# Patient Record
Sex: FEMALE | Race: WHITE | NOT HISPANIC OR LATINO | Employment: UNEMPLOYED | URBAN - METROPOLITAN AREA
[De-identification: names, ages, dates, MRNs, and addresses within clinical notes are randomized per-mention and may not be internally consistent; named-entity substitution may affect disease eponyms.]

---

## 2017-07-27 ENCOUNTER — TELEPHONE (OUTPATIENT)
Dept: FAMILY MEDICINE CLINIC | Facility: CLINIC | Age: 52
End: 2017-07-27

## 2017-07-27 RX ORDER — LISINOPRIL 20 MG/1
20 TABLET ORAL DAILY
Qty: 30 TABLET | Refills: 3 | Status: SHIPPED | OUTPATIENT
Start: 2017-07-27 | End: 2017-10-10 | Stop reason: SDUPTHER

## 2017-07-27 NOTE — TELEPHONE ENCOUNTER
I would like for her to increase her lisinopril to 20mg qd from 10 mg qd.  I have sent in new rx for the 20mg tablets.  Recheck with an appt in a few months and monitor her bp prior to that.  Will take a few days for med increase to fully take affect

## 2017-07-27 NOTE — TELEPHONE ENCOUNTER
Pt took bp at home with her machine and it was reading 160/106 in one arm and in the other arm it was reading a totally different number. I advised her that she may need to order another machine.       I took her bp in the office in the left arm it was 160/90 and in the right arm it was 152/90. I advised her that it is elevated, medication adjustment may need to be made but it is okay to go home and I would send message to

## 2017-08-04 ENCOUNTER — TRANSCRIBE ORDERS (OUTPATIENT)
Dept: ADMINISTRATIVE | Facility: HOSPITAL | Age: 52
End: 2017-08-04

## 2017-08-04 DIAGNOSIS — Z12.31 VISIT FOR SCREENING MAMMOGRAM: Primary | ICD-10-CM

## 2017-08-15 ENCOUNTER — OFFICE VISIT (OUTPATIENT)
Dept: FAMILY MEDICINE CLINIC | Facility: CLINIC | Age: 52
End: 2017-08-15

## 2017-08-15 ENCOUNTER — TELEPHONE (OUTPATIENT)
Dept: FAMILY MEDICINE CLINIC | Facility: CLINIC | Age: 52
End: 2017-08-15

## 2017-08-15 VITALS
DIASTOLIC BLOOD PRESSURE: 84 MMHG | SYSTOLIC BLOOD PRESSURE: 140 MMHG | TEMPERATURE: 98.7 F | OXYGEN SATURATION: 96 % | RESPIRATION RATE: 18 BRPM | WEIGHT: 174.5 LBS | BODY MASS INDEX: 28.04 KG/M2 | HEART RATE: 92 BPM | HEIGHT: 66 IN

## 2017-08-15 DIAGNOSIS — R73.9 HYPERGLYCEMIA: ICD-10-CM

## 2017-08-15 DIAGNOSIS — G47.00 INSOMNIA, UNSPECIFIED TYPE: ICD-10-CM

## 2017-08-15 DIAGNOSIS — M54.2 NECK PAIN ON RIGHT SIDE: ICD-10-CM

## 2017-08-15 DIAGNOSIS — I10 ESSENTIAL HYPERTENSION: Primary | ICD-10-CM

## 2017-08-15 PROCEDURE — 99214 OFFICE O/P EST MOD 30 MIN: CPT | Performed by: INTERNAL MEDICINE

## 2017-08-15 RX ORDER — METHYLPREDNISOLONE 4 MG/1
TABLET ORAL
Qty: 21 TABLET | Refills: 0 | Status: SHIPPED | OUTPATIENT
Start: 2017-08-15 | End: 2017-10-10

## 2017-08-15 NOTE — TELEPHONE ENCOUNTER
Pt took her bp with her automatic machine in the left arm with a reading of: 154/97      I took BP manual at the visit with a reading of: 150/90

## 2017-08-15 NOTE — PROGRESS NOTES
Subjective   Julisa Nixon is a 52 y.o. female who comes in today for   Chief Complaint   Patient presents with   • Hypertension   .    History of Present Illness   Here due to elevated blood pressure despite taking lisinopril 10mg qd so I advised a few weeks ago to increase to 20mg qd .  She has had a lot of changes over the past few months with a right shoulder injury and taking 400mg tid frequently that gets better and then worse related to working out in the gym.  Pain can go up her right neck and down her right arm at times.  Since increasing to the 20mg her BP is better.  She strained her right shoulder /neck with doing excessive classes at the gym.  Also taking trazodone for insomnia.    The following portions of the patient's history were reviewed and updated as appropriate: allergies, current medications, past family history, past medical history, past social history, past surgical history and problem list.    Review of Systems   Constitutional: Negative.    Cardiovascular:        Elevated blood pressure   Musculoskeletal: Positive for arthralgias (right shoulder).       Vitals:    08/15/17 1243   BP: 140/84   Pulse: 92   Resp: 18   Temp: 98.7 °F (37.1 °C)   SpO2: 96%       Objective   Physical Exam   Constitutional: She is oriented to person, place, and time. She appears well-developed and well-nourished.   HENT:   Head: Normocephalic and atraumatic.   Right Ear: External ear normal.   Left Ear: External ear normal.   Mouth/Throat: Oropharynx is clear and moist.   Eyes: Conjunctivae are normal.   Neck: Neck supple.   Cardiovascular: Normal rate, regular rhythm and normal heart sounds.    Pulmonary/Chest: Effort normal and breath sounds normal.   Abdominal: Soft. Bowel sounds are normal.   Musculoskeletal:   ROM is normal In shoulder right and left.  Some point tenderness in the posterior right back paraspinal muscles.     Neurological: She is alert and oriented to person, place, and time. She has  normal reflexes.   Skin: Skin is warm.   Psychiatric: She has a normal mood and affect. Her behavior is normal. Judgment and thought content normal.   Nursing note and vitals reviewed.      Assessment/Plan   Julisa was seen today for hypertension.    Diagnoses and all orders for this visit:    Essential hypertension  -     Comprehensive Metabolic Panel  -     Hemoglobin A1c  -     Lipid Panel With LDL / HDL Ratio  -     TSH    Insomnia, unspecified type  -     Comprehensive Metabolic Panel  -     Hemoglobin A1c  -     Lipid Panel With LDL / HDL Ratio  -     TSH    Neck pain on right side  -     Ambulatory Referral to Physical Therapy Evaluate and treat  -     Comprehensive Metabolic Panel  -     Hemoglobin A1c  -     Lipid Panel With LDL / HDL Ratio  -     TSH    Hyperglycemia  -     Comprehensive Metabolic Panel  -     Hemoglobin A1c  -     Lipid Panel With LDL / HDL Ratio  -     TSH    Other orders  -     MethylPREDNISolone (MEDROL, QIAN,) 4 MG tablet; Take as directed on package instructions.      Stop ibuprofen  Start medrol dosepak  Continue lisinopril 20mg qd  Continue traz for insomnia  Start PT for neck and shoulder strain               I have asked for the patient to return to clinic in 6month(s).

## 2017-08-15 NOTE — PROGRESS NOTES
Subjective   Julisa Nixon is a 52 y.o. female who comes in today for   Chief Complaint   Patient presents with   • Hypertension   .    History of Present Illness   HERE TODAY TO F/U ON LISINOPRIL THAT SHE TAKES FOR HTN AND TRAZODONE FOR INSOMNIA.    The following portions of the patient's history were reviewed and updated as appropriate: allergies, current medications, past family history, past medical history, past social history, past surgical history and problem list.    Review of Systems   Constitutional: Negative.    Cardiovascular:        Elev bp   Musculoskeletal: Positive for neck pain.       Vitals:    08/15/17 1243   BP: 140/84   Pulse: 92   Resp: 18   Temp: 98.7 °F (37.1 °C)   SpO2: 96%       Objective   Physical Exam   Constitutional: She is oriented to person, place, and time. She appears well-developed and well-nourished.   HENT:   Head: Normocephalic and atraumatic.   Right Ear: External ear normal.   Left Ear: External ear normal.   Mouth/Throat: Oropharynx is clear and moist.   Eyes: Conjunctivae are normal.   Neck: Neck supple.   Cardiovascular: Normal rate, regular rhythm and normal heart sounds.    Pulmonary/Chest: Effort normal and breath sounds normal.   Abdominal: Soft. Bowel sounds are normal.   Neurological: She is alert and oriented to person, place, and time.   Skin: Skin is warm.   Psychiatric: She has a normal mood and affect. Her behavior is normal. Judgment and thought content normal.   Nursing note and vitals reviewed.      Assessment/Plan   There are no diagnoses linked to this encounter.               I have asked for the patient to return to clinic in 6month(s).

## 2017-08-16 LAB
ALBUMIN SERPL-MCNC: 5 G/DL (ref 3.5–5.2)
ALBUMIN/GLOB SERPL: 1.7 G/DL
ALP SERPL-CCNC: 64 U/L (ref 39–117)
ALT SERPL-CCNC: 25 U/L (ref 1–33)
AST SERPL-CCNC: 17 U/L (ref 1–32)
BILIRUB SERPL-MCNC: 0.3 MG/DL (ref 0.1–1.2)
BUN SERPL-MCNC: 11 MG/DL (ref 6–20)
BUN/CREAT SERPL: 15.1 (ref 7–25)
CALCIUM SERPL-MCNC: 9.4 MG/DL (ref 8.6–10.5)
CHLORIDE SERPL-SCNC: 99 MMOL/L (ref 98–107)
CHOLEST SERPL-MCNC: 255 MG/DL (ref 0–200)
CO2 SERPL-SCNC: 26.6 MMOL/L (ref 22–29)
CREAT SERPL-MCNC: 0.73 MG/DL (ref 0.57–1)
GLOBULIN SER CALC-MCNC: 2.9 GM/DL
GLUCOSE SERPL-MCNC: 96 MG/DL (ref 65–99)
HBA1C MFR BLD: 5.69 % (ref 4.8–5.6)
HDLC SERPL-MCNC: 56 MG/DL (ref 40–60)
LDLC SERPL CALC-MCNC: 169 MG/DL (ref 0–100)
LDLC/HDLC SERPL: 3.01 {RATIO}
POTASSIUM SERPL-SCNC: 4.3 MMOL/L (ref 3.5–5.2)
PROT SERPL-MCNC: 7.9 G/DL (ref 6–8.5)
SODIUM SERPL-SCNC: 141 MMOL/L (ref 136–145)
TRIGL SERPL-MCNC: 151 MG/DL (ref 0–150)
TSH SERPL DL<=0.005 MIU/L-ACNC: 1.92 MIU/ML (ref 0.27–4.2)
VLDLC SERPL CALC-MCNC: 30.2 MG/DL (ref 5–40)

## 2017-08-18 ENCOUNTER — TELEPHONE (OUTPATIENT)
Dept: FAMILY MEDICINE CLINIC | Facility: CLINIC | Age: 52
End: 2017-08-18

## 2017-08-18 RX ORDER — ROSUVASTATIN CALCIUM 5 MG/1
5 TABLET, COATED ORAL DAILY
Qty: 30 TABLET | Refills: 2 | Status: SHIPPED | OUTPATIENT
Start: 2017-08-18 | End: 2017-10-10 | Stop reason: SDUPTHER

## 2017-08-18 NOTE — TELEPHONE ENCOUNTER
The lab results were sent back to dr. Stewart. Will check with her today to see if sending. I think it was lipitor.

## 2017-08-18 NOTE — TELEPHONE ENCOUNTER
----- Message from Chaparrita Ceja sent at 8/17/2017  3:55 PM EDT -----  Pt is checking on the status of her Crestor being sent to [facility].

## 2017-08-21 ENCOUNTER — TELEPHONE (OUTPATIENT)
Dept: FAMILY MEDICINE CLINIC | Facility: CLINIC | Age: 52
End: 2017-08-21

## 2017-09-21 ENCOUNTER — TRANSCRIBE ORDERS (OUTPATIENT)
Dept: ADMINISTRATIVE | Facility: HOSPITAL | Age: 52
End: 2017-09-21

## 2017-09-21 DIAGNOSIS — Z12.31 VISIT FOR SCREENING MAMMOGRAM: Primary | ICD-10-CM

## 2017-10-10 ENCOUNTER — OFFICE VISIT (OUTPATIENT)
Dept: FAMILY MEDICINE CLINIC | Facility: CLINIC | Age: 52
End: 2017-10-10

## 2017-10-10 VITALS
TEMPERATURE: 98.3 F | RESPIRATION RATE: 18 BRPM | OXYGEN SATURATION: 97 % | BODY MASS INDEX: 28.14 KG/M2 | DIASTOLIC BLOOD PRESSURE: 80 MMHG | HEIGHT: 66 IN | HEART RATE: 94 BPM | SYSTOLIC BLOOD PRESSURE: 124 MMHG | WEIGHT: 175.1 LBS

## 2017-10-10 DIAGNOSIS — G47.00 INSOMNIA, UNSPECIFIED TYPE: ICD-10-CM

## 2017-10-10 DIAGNOSIS — I10 ESSENTIAL HYPERTENSION: Primary | ICD-10-CM

## 2017-10-10 DIAGNOSIS — E78.5 HYPERLIPIDEMIA, UNSPECIFIED HYPERLIPIDEMIA TYPE: ICD-10-CM

## 2017-10-10 DIAGNOSIS — R73.9 HYPERGLYCEMIA: ICD-10-CM

## 2017-10-10 PROCEDURE — 99214 OFFICE O/P EST MOD 30 MIN: CPT | Performed by: INTERNAL MEDICINE

## 2017-10-10 RX ORDER — LISINOPRIL 20 MG/1
20 TABLET ORAL DAILY
Qty: 30 TABLET | Refills: 6 | Status: SHIPPED | OUTPATIENT
Start: 2017-10-10 | End: 2018-03-20 | Stop reason: SDUPTHER

## 2017-10-10 RX ORDER — TRAZODONE HYDROCHLORIDE 50 MG/1
50 TABLET ORAL NIGHTLY PRN
Qty: 30 TABLET | Refills: 1 | Status: SHIPPED | OUTPATIENT
Start: 2017-10-10 | End: 2018-09-04

## 2017-10-10 RX ORDER — ROSUVASTATIN CALCIUM 5 MG/1
5 TABLET, COATED ORAL DAILY
Qty: 30 TABLET | Refills: 6 | Status: SHIPPED | OUTPATIENT
Start: 2017-10-10 | End: 2018-03-20 | Stop reason: SDUPTHER

## 2017-10-10 NOTE — PROGRESS NOTES
Subjective   Julisa Nixon is a 52 y.o. female who comes in today for No chief complaint on file.  .    History of Present Illness   Here for f/u on HL and taking crestor.  Her recent left arm and shoulder pain is better and did PT which helped  Hasn't been exercising as much to try and lay off the weights.  She is focusing on cardio at this point.  HTN and on lisinopril and insomnia taking trazodone.    Stress echo 2/2015 neg    The following portions of the patient's history were reviewed and updated as appropriate: allergies, current medications, past family history, past medical history, past social history, past surgical history and problem list.    Review of Systems   Constitutional: Negative.    Musculoskeletal: Negative.    Psychiatric/Behavioral: Negative for sleep disturbance.       Vitals:    10/10/17 1002   BP: 124/80   Pulse: 94   Resp: 18   Temp: 98.3 °F (36.8 °C)   SpO2: 97%       Objective   Physical Exam   Constitutional: She is oriented to person, place, and time. She appears well-developed and well-nourished.   HENT:   Head: Normocephalic and atraumatic.   Right Ear: External ear normal.   Left Ear: External ear normal.   Mouth/Throat: Oropharynx is clear and moist.   Eyes: Conjunctivae are normal.   Neck: Neck supple.   Cardiovascular: Normal rate, regular rhythm and normal heart sounds.    Pulmonary/Chest: Effort normal and breath sounds normal.   Abdominal: Soft. Bowel sounds are normal.   Neurological: She is alert and oriented to person, place, and time.   Skin: Skin is warm.   Psychiatric: She has a normal mood and affect. Her behavior is normal. Judgment and thought content normal.   Nursing note and vitals reviewed.      Assessment/Plan   Diagnoses and all orders for this visit:    Essential hypertension  -     Comprehensive Metabolic Panel  -     Lipid Panel With LDL / HDL Ratio  -     Hemoglobin A1c    Hyperglycemia  -     Comprehensive Metabolic Panel  -     Lipid Panel With LDL /  HDL Ratio  -     Hemoglobin A1c    Hyperlipidemia, unspecified hyperlipidemia type  -     Comprehensive Metabolic Panel  -     Lipid Panel With LDL / HDL Ratio  -     Hemoglobin A1c    Insomnia, unspecified type  -     Comprehensive Metabolic Panel  -     Lipid Panel With LDL / HDL Ratio  -     Hemoglobin A1c    Other orders  -     traZODone (DESYREL) 50 MG tablet; Take 1 tablet by mouth At Night As Needed for Sleep.  -     lisinopril (PRINIVIL,ZESTRIL) 20 MG tablet; Take 1 tablet by mouth Daily.  -     rosuvastatin (CRESTOR) 5 MG tablet; Take 1 tablet by mouth Daily.    If her left shoulder starts to bother her again, she needs to see ortho  Refill her meds for HL, HTN and insomnia  Restart her aerobic exercise               I have asked for the patient to return to clinic in 6month(s).

## 2017-10-11 LAB
ALBUMIN SERPL-MCNC: 4.8 G/DL (ref 3.5–5.2)
ALBUMIN/GLOB SERPL: 1.5 G/DL
ALP SERPL-CCNC: 67 U/L (ref 39–117)
ALT SERPL-CCNC: 26 U/L (ref 1–33)
AST SERPL-CCNC: 19 U/L (ref 1–32)
BILIRUB SERPL-MCNC: 0.4 MG/DL (ref 0.1–1.2)
BUN SERPL-MCNC: 12 MG/DL (ref 6–20)
BUN/CREAT SERPL: 15.2 (ref 7–25)
CALCIUM SERPL-MCNC: 10 MG/DL (ref 8.6–10.5)
CHLORIDE SERPL-SCNC: 102 MMOL/L (ref 98–107)
CHOLEST SERPL-MCNC: 188 MG/DL (ref 0–200)
CO2 SERPL-SCNC: 28.2 MMOL/L (ref 22–29)
CREAT SERPL-MCNC: 0.79 MG/DL (ref 0.57–1)
GLOBULIN SER CALC-MCNC: 3.2 GM/DL
GLUCOSE SERPL-MCNC: 105 MG/DL (ref 65–99)
HBA1C MFR BLD: 5.51 % (ref 4.8–5.6)
HDLC SERPL-MCNC: 62 MG/DL (ref 40–60)
LDLC SERPL CALC-MCNC: 98 MG/DL (ref 0–100)
LDLC/HDLC SERPL: 1.58 {RATIO}
POTASSIUM SERPL-SCNC: 4.5 MMOL/L (ref 3.5–5.2)
PROT SERPL-MCNC: 8 G/DL (ref 6–8.5)
SODIUM SERPL-SCNC: 144 MMOL/L (ref 136–145)
TRIGL SERPL-MCNC: 139 MG/DL (ref 0–150)
VLDLC SERPL CALC-MCNC: 27.8 MG/DL (ref 5–40)

## 2017-10-23 ENCOUNTER — HOSPITAL ENCOUNTER (OUTPATIENT)
Dept: MAMMOGRAPHY | Facility: HOSPITAL | Age: 52
Discharge: HOME | End: 2017-10-23
Attending: OBSTETRICS & GYNECOLOGY | Admitting: OBSTETRICS & GYNECOLOGY

## 2017-10-23 DIAGNOSIS — Z12.31 VISIT FOR SCREENING MAMMOGRAM: ICD-10-CM

## 2017-10-23 PROCEDURE — 77063 BREAST TOMOSYNTHESIS BI: CPT

## 2017-10-23 PROCEDURE — G0202 SCR MAMMO BI INCL CAD: HCPCS

## 2017-10-26 ENCOUNTER — TELEPHONE (OUTPATIENT)
Dept: OBSTETRICS AND GYNECOLOGY | Facility: CLINIC | Age: 52
End: 2017-10-26

## 2017-11-08 ENCOUNTER — OFFICE VISIT (OUTPATIENT)
Dept: SPORTS MEDICINE | Facility: CLINIC | Age: 52
End: 2017-11-08

## 2017-11-08 VITALS
BODY MASS INDEX: 28.99 KG/M2 | SYSTOLIC BLOOD PRESSURE: 124 MMHG | DIASTOLIC BLOOD PRESSURE: 70 MMHG | HEIGHT: 65 IN | WEIGHT: 174 LBS

## 2017-11-08 DIAGNOSIS — M25.512 LEFT SHOULDER PAIN, UNSPECIFIED CHRONICITY: Primary | ICD-10-CM

## 2017-11-08 PROCEDURE — 99203 OFFICE O/P NEW LOW 30 MIN: CPT | Performed by: FAMILY MEDICINE

## 2017-11-08 NOTE — PROGRESS NOTES
"Julisa is a 52 y.o. year old female    Chief Complaint   Patient presents with   • Shoulder Pain     Left       History of Present Illness  HPI   Here to discuss concerns with left shoulder pain. This spring went back to exercising in the gym after a few months off and had rather abrupt obset left shoulder pain. Resolved with rest, but again returned when she went back to exercise. Burning pain, constant, radiates to the neck and front of the chest. She did PT this summer with decent benefit.  Currently exercising without weights and doing well, no pain with daily activities.     I have reviewed the patient's medical, family, and social history in detail and updated the computerized patient record.    Review of Systems   Constitutional: Negative for fever.   Skin: Negative for wound.   Neurological: Negative for numbness.   All other systems reviewed and are negative.      /70  Ht 65\" (165.1 cm)  Wt 174 lb (78.9 kg)  BMI 28.96 kg/m2     Physical Exam    Vital signs reviewed.   General: No acute distress.  Eyes: conjunctiva clear; pupils equally round and reactive  ENT: external ears and nose atraumatic; oropharynx clear  CV: no peripheral edema, 2+ distal pulses  Resp: normal respiratory effort, no use of accessory muscles  Skin: no rashes or wounds; normal turgor  Psych: mood and affect appropriate; recent and remote memory intact  Neuro: sensation to light touch intact    MSK Exam:  Left Shoulder Exam   Left shoulder exam is normal.    Tenderness   The patient is experiencing no tenderness.         Range of Motion   The patient has normal left shoulder ROM.    Muscle Strength   The patient has normal left shoulder strength.    Tests   Apprehension: negative  Cross Arm: negative  Drop Arm: negative  Hawkin's test: negative  Impingement: negative                Diagnoses and all orders for this visit:    Left shoulder pain, unspecified chronicity  I discussed the patient's concerns in detail; essentially, " she needs to start a gradual return to activity for an issue that appears to have resolved. Her shoulder is functioning appropriately and appears stable for this. We discussed foundational shoulder stability strengthening and progression from there. I provided some exercises, and additionally recommend she meet with a  at her gym to help with this progression. She can call and/or follow up with any questions, recurrent pain, or concerns.     I spent greater than 50% of this 35 minute visit discussing the diagnosis, prognosis, treatment plan, etc. Patient's questions were answered in detail with appropriate counseling and anticipatory guidance.        EMR Dragon/Transcription disclaimer:    Much of this encounter note is an electronic transcription/translation of spoken language to printed text.  The electronic translation of spoken language may permit erroneous, or at times, nonsensical words or phrases to be inadvertently transcribed.  Although I have reviewed the note for such errors some may still exist.

## 2017-12-13 ENCOUNTER — OFFICE VISIT (OUTPATIENT)
Dept: OBSTETRICS AND GYNECOLOGY | Facility: CLINIC | Age: 52
End: 2017-12-13

## 2017-12-13 VITALS
WEIGHT: 180 LBS | HEIGHT: 66 IN | BODY MASS INDEX: 28.93 KG/M2 | SYSTOLIC BLOOD PRESSURE: 132 MMHG | DIASTOLIC BLOOD PRESSURE: 80 MMHG

## 2017-12-13 DIAGNOSIS — Z00.00 ANNUAL PHYSICAL EXAM: Primary | ICD-10-CM

## 2017-12-13 LAB
BILIRUB BLD-MCNC: NEGATIVE MG/DL
CLARITY, POC: CLEAR
COLOR UR: YELLOW
GLUCOSE UR STRIP-MCNC: NEGATIVE MG/DL
KETONES UR QL: NEGATIVE
LEUKOCYTE EST, POC: NEGATIVE
NITRITE UR-MCNC: NEGATIVE MG/ML
PH UR: 5 [PH] (ref 5–8)
PROT UR STRIP-MCNC: NEGATIVE MG/DL
RBC # UR STRIP: NEGATIVE /UL
SP GR UR: 1 (ref 1–1.03)
UROBILINOGEN UR QL: NORMAL

## 2017-12-13 PROCEDURE — 81002 URINALYSIS NONAUTO W/O SCOPE: CPT | Performed by: OBSTETRICS & GYNECOLOGY

## 2017-12-13 PROCEDURE — 99396 PREV VISIT EST AGE 40-64: CPT | Performed by: OBSTETRICS & GYNECOLOGY

## 2017-12-13 NOTE — PROGRESS NOTES
GYN Annual Exam     CC- Here for annual exam.     Julisa Nixon is a 52 y.o. female established patient who presents for annual well woman exam. NO  VB or hot flashes. She has URI today and feels terrible.         Menarche:11  Menopause:44  HRT:none  Current contraception: status post hysterectomy- TLH with OC for DUB age 44  History of abnormal Pap smear: no  History of abnormal mammogram: yes - B9 cyst  Family history of uterine, colon or ovarian cancer: yes - PGF and P aunt colon  Family history of breast cancer: no  STD's: none    Health Maintenance   Topic Date Due   • TDAP/TD VACCINES (1 - Tdap) 05/05/1982   • HEPATITIS C SCREENING  07/15/2016   • LIPID PANEL  10/10/2018   • MAMMOGRAM  10/23/2019   • PAP SMEAR  11/01/2019   • COLONOSCOPY  09/22/2024   • INFLUENZA VACCINE  Addressed       Past Medical History:   Diagnosis Date   • Hypertension        Past Surgical History:   Procedure Laterality Date   • BREAST BIOPSY     • COLONOSCOPY     • HYSTERECTOMY      TLH with OC age 44 for DUB         Current Outpatient Prescriptions:   •  benzonatate (TESSALON) 200 MG capsule, One cap po TID for cough, Disp: 30 capsule, Rfl: 1  •  guaifenesin-codeine (CHERATUSSIN AC) 100-10 MG/5ML liquid, 10 ml po Q4H prn cough, Disp: 118 mL, Rfl: 1  •  lisinopril (PRINIVIL,ZESTRIL) 20 MG tablet, Take 1 tablet by mouth Daily., Disp: 30 tablet, Rfl: 6  •  rosuvastatin (CRESTOR) 5 MG tablet, Take 1 tablet by mouth Daily., Disp: 30 tablet, Rfl: 6  •  traZODone (DESYREL) 50 MG tablet, Take 1 tablet by mouth At Night As Needed for Sleep., Disp: 30 tablet, Rfl: 1    No Known Allergies    Social History   Substance Use Topics   • Smoking status: Never Smoker   • Smokeless tobacco: Never Used   • Alcohol use No       Family History   Problem Relation Age of Onset   • Stroke Father    • Colon cancer Paternal Grandfather    • Colon cancer Paternal Aunt    • Breast cancer Neg Hx    • Ovarian cancer Neg Hx        Review of Systems  "  Constitutional: Negative for appetite change, fatigue, fever and unexpected weight change.   HENT: Positive for congestion, postnasal drip and sinus pain.    Eyes: Positive for redness.   Respiratory: Positive for cough. Negative for shortness of breath.    Cardiovascular: Negative for chest pain and palpitations.   Gastrointestinal: Negative for abdominal distention, abdominal pain, constipation, diarrhea and nausea.   Endocrine: Negative for cold intolerance and heat intolerance.   Genitourinary: Negative for dyspareunia, dysuria, menstrual problem, pelvic pain and vaginal discharge.   Skin: Negative for color change and rash.   Neurological: Negative for headaches.   Psychiatric/Behavioral: Negative for dysphoric mood. The patient is not nervous/anxious.        /80  Ht 167.6 cm (66\")  Wt 81.6 kg (180 lb)  BMI 29.05 kg/m2    Physical Exam   Constitutional: She is oriented to person, place, and time. She appears well-developed and well-nourished.   HENT:   Head: Normocephalic and atraumatic.   Neck: No thyromegaly present.   Cardiovascular: Normal rate and regular rhythm.    Pulmonary/Chest: Effort normal and breath sounds normal. Right breast exhibits no inverted nipple, no mass, no nipple discharge, no skin change and no tenderness. Left breast exhibits no inverted nipple, no mass, no nipple discharge, no skin change and no tenderness.   Abdominal: Soft. Bowel sounds are normal. She exhibits no distension and no mass. There is no tenderness. No hernia.   Genitourinary: Pelvic exam was performed with patient supine. There is no rash, tenderness or lesion on the right labia. There is no rash, tenderness or lesion on the left labia. Right adnexum displays no mass, no tenderness and no fullness. Left adnexum displays no mass, no tenderness and no fullness. No erythema, tenderness or bleeding in the vagina. No foreign body in the vagina. No signs of injury around the vagina. No vaginal discharge found. "   Genitourinary Comments: Normal cuff   Neurological: She is oriented to person, place, and time.   Skin: Skin is warm and dry.   Psychiatric: She has a normal mood and affect. Her behavior is normal. Judgment and thought content normal.   Nursing note and vitals reviewed.         Assessment/Plan    1) GYN HM: check pap/HPV   SBE demonstrated and encouraged.  2) STD screening: declines Condoms encouraged.  3) Bone health - Weight bearing exercise, dietary calcium recommendations and vitamin D reviewed.   4) Diet and Exercise discussed  5) Smoking Status: nonsmoker  6) Social: daughter pregnant  7)MMG:  UTD 11/2017, B1  8) DEXA-plan age 53  9)C scope- UTD until 2019   Follow up prn and 1 year       Julisa was seen today for gynecologic exam.    Diagnoses and all orders for this visit:    Annual physical exam  -     PapIG, HPV, Rfx 16 / 18 - ThinPrep Vial, Cervix  -     POC Urinalysis Dipstick        Ariana Salcedo MD  12/13/2017  9:36 PM

## 2017-12-18 LAB
CYTOLOGIST CVX/VAG CYTO: NORMAL
CYTOLOGY CVX/VAG DOC THIN PREP: NORMAL
DX ICD CODE: NORMAL
HIV 1 & 2 AB SER-IMP: NORMAL
HPV I/H RISK 1 DNA CVX QL PROBE+SIG AMP: NEGATIVE
OTHER STN SPEC: NORMAL
PATH REPORT.FINAL DX SPEC: NORMAL
STAT OF ADQ CVX/VAG CYTO-IMP: NORMAL

## 2018-03-20 ENCOUNTER — OFFICE VISIT (OUTPATIENT)
Dept: FAMILY MEDICINE CLINIC | Facility: CLINIC | Age: 53
End: 2018-03-20

## 2018-03-20 VITALS
HEIGHT: 66 IN | SYSTOLIC BLOOD PRESSURE: 130 MMHG | BODY MASS INDEX: 28.53 KG/M2 | HEART RATE: 99 BPM | WEIGHT: 177.5 LBS | DIASTOLIC BLOOD PRESSURE: 70 MMHG | RESPIRATION RATE: 18 BRPM | TEMPERATURE: 97.8 F | OXYGEN SATURATION: 97 %

## 2018-03-20 DIAGNOSIS — G47.00 INSOMNIA, UNSPECIFIED TYPE: ICD-10-CM

## 2018-03-20 DIAGNOSIS — E78.5 HYPERLIPIDEMIA, UNSPECIFIED HYPERLIPIDEMIA TYPE: ICD-10-CM

## 2018-03-20 DIAGNOSIS — I10 ESSENTIAL HYPERTENSION: Primary | ICD-10-CM

## 2018-03-20 DIAGNOSIS — R73.9 HYPERGLYCEMIA: ICD-10-CM

## 2018-03-20 PROCEDURE — 99214 OFFICE O/P EST MOD 30 MIN: CPT | Performed by: INTERNAL MEDICINE

## 2018-03-20 RX ORDER — ROSUVASTATIN CALCIUM 5 MG/1
5 TABLET, COATED ORAL DAILY
Qty: 30 TABLET | Refills: 6 | Status: SHIPPED | OUTPATIENT
Start: 2018-03-20 | End: 2018-06-20 | Stop reason: SINTOL

## 2018-03-20 RX ORDER — LISINOPRIL 20 MG/1
20 TABLET ORAL DAILY
Qty: 30 TABLET | Refills: 6 | Status: SHIPPED | OUTPATIENT
Start: 2018-03-20 | End: 2018-11-19 | Stop reason: SDUPTHER

## 2018-03-20 NOTE — PROGRESS NOTES
Subjective   Julisa Nixon is a 52 y.o. female who comes in today for   Chief Complaint   Patient presents with   • Hypertension     routine visit needs refills    .    History of Present Illness   Here for f/u on HTN and HL.  Doing well on lisinopril and crestor for HTN and HL .  Insomnia is treated with trazodone.  Doing well and exercising.  Is expecting 2 grandbabies.  Feeling well.  No complaints.    The following portions of the patient's history were reviewed and updated as appropriate: allergies, current medications, past family history, past medical history, past social history, past surgical history and problem list.    Review of Systems   Constitutional: Negative.    Respiratory: Negative.    Cardiovascular: Negative.    Psychiatric/Behavioral: Negative.        Vitals:    03/20/18 1403   BP: 130/70   Pulse: 99   Resp: 18   Temp: 97.8 °F (36.6 °C)   SpO2: 97%       Objective   Physical Exam   Constitutional: She is oriented to person, place, and time. She appears well-developed and well-nourished.   HENT:   Head: Normocephalic and atraumatic.   Right Ear: External ear normal.   Left Ear: External ear normal.   Mouth/Throat: Oropharynx is clear and moist.   Eyes: Conjunctivae are normal.   Neck: Neck supple.   Cardiovascular: Normal rate, regular rhythm and normal heart sounds.    No bruits   Pulmonary/Chest: Effort normal and breath sounds normal. No respiratory distress. She has no wheezes. She has no rales.   Abdominal: Soft. Bowel sounds are normal. She exhibits no distension and no mass. There is no tenderness.   Lymphadenopathy:     She has no cervical adenopathy.   Neurological: She is alert and oriented to person, place, and time.   Skin: Skin is warm.   Psychiatric: She has a normal mood and affect. Her behavior is normal. Judgment and thought content normal.   Nursing note and vitals reviewed.      Assessment/Plan   Julisa was seen today for hypertension.    Diagnoses and all orders for this  visit:    Essential hypertension  -     Comprehensive Metabolic Panel  -     Lipid Panel With LDL / HDL Ratio  -     TSH  -     Hemoglobin A1c    Hyperlipidemia, unspecified hyperlipidemia type  -     Comprehensive Metabolic Panel  -     Lipid Panel With LDL / HDL Ratio  -     TSH  -     Hemoglobin A1c    Insomnia, unspecified type  -     Comprehensive Metabolic Panel  -     Lipid Panel With LDL / HDL Ratio  -     TSH  -     Hemoglobin A1c    Hyperglycemia  -     Comprehensive Metabolic Panel  -     Lipid Panel With LDL / HDL Ratio  -     TSH  -     Hemoglobin A1c    Other orders  -     lisinopril (PRINIVIL,ZESTRIL) 20 MG tablet; Take 1 tablet by mouth Daily.  -     rosuvastatin (CRESTOR) 5 MG tablet; Take 1 tablet by mouth Daily.    RF crestor and lisinopril for HL and HTN  Labs ordered                 I have asked for the patient to return to clinic in 6month(s).

## 2018-03-21 LAB
ALBUMIN SERPL-MCNC: 4.6 G/DL (ref 3.5–5.2)
ALBUMIN/GLOB SERPL: 1.5 G/DL
ALP SERPL-CCNC: 70 U/L (ref 39–117)
ALT SERPL-CCNC: 36 U/L (ref 1–33)
AST SERPL-CCNC: 27 U/L (ref 1–32)
BILIRUB SERPL-MCNC: 0.4 MG/DL (ref 0.1–1.2)
BUN SERPL-MCNC: 15 MG/DL (ref 6–20)
BUN/CREAT SERPL: 18.5 (ref 7–25)
CALCIUM SERPL-MCNC: 9.5 MG/DL (ref 8.6–10.5)
CHLORIDE SERPL-SCNC: 98 MMOL/L (ref 98–107)
CHOLEST SERPL-MCNC: 162 MG/DL (ref 0–200)
CO2 SERPL-SCNC: 27.6 MMOL/L (ref 22–29)
CREAT SERPL-MCNC: 0.81 MG/DL (ref 0.57–1)
GFR SERPLBLD CREATININE-BSD FMLA CKD-EPI: 74 ML/MIN/1.73
GFR SERPLBLD CREATININE-BSD FMLA CKD-EPI: 89 ML/MIN/1.73
GLOBULIN SER CALC-MCNC: 3.1 GM/DL
GLUCOSE SERPL-MCNC: 89 MG/DL (ref 65–99)
HBA1C MFR BLD: 5.95 % (ref 4.8–5.6)
HDLC SERPL-MCNC: 59 MG/DL (ref 40–60)
LDLC SERPL CALC-MCNC: 82 MG/DL (ref 0–100)
LDLC/HDLC SERPL: 1.4 {RATIO}
POTASSIUM SERPL-SCNC: 4 MMOL/L (ref 3.5–5.2)
PROT SERPL-MCNC: 7.7 G/DL (ref 6–8.5)
SODIUM SERPL-SCNC: 140 MMOL/L (ref 136–145)
TRIGL SERPL-MCNC: 103 MG/DL (ref 0–150)
TSH SERPL DL<=0.005 MIU/L-ACNC: 1.73 MIU/ML (ref 0.27–4.2)
VLDLC SERPL CALC-MCNC: 20.6 MG/DL (ref 5–40)

## 2018-04-26 ENCOUNTER — TELEPHONE (OUTPATIENT)
Dept: FAMILY MEDICINE CLINIC | Facility: CLINIC | Age: 53
End: 2018-04-26

## 2018-04-26 RX ORDER — ONDANSETRON 4 MG/1
4 TABLET, ORALLY DISINTEGRATING ORAL EVERY 8 HOURS PRN
Qty: 20 TABLET | Refills: 0 | Status: SHIPPED | OUTPATIENT
Start: 2018-04-26 | End: 2018-09-04

## 2018-04-26 NOTE — TELEPHONE ENCOUNTER
Patient is going out of the country on Saturday.   She would like a travelers medication sent in for her, for nausea and diarrhea.

## 2018-04-26 NOTE — TELEPHONE ENCOUNTER
zofran sent to pharmacy however the diarrhea medication is otc.  immodium works well and is safe to take as long as she isn't have infectious sx's like fever or abdominal pain

## 2018-06-20 ENCOUNTER — TELEPHONE (OUTPATIENT)
Dept: FAMILY MEDICINE CLINIC | Facility: CLINIC | Age: 53
End: 2018-06-20

## 2018-06-20 RX ORDER — ATORVASTATIN CALCIUM 20 MG/1
20 TABLET, FILM COATED ORAL DAILY
Qty: 30 TABLET | Refills: 1 | Status: SHIPPED | OUTPATIENT
Start: 2018-06-20 | End: 2018-09-04

## 2018-06-20 NOTE — TELEPHONE ENCOUNTER
Patient is taking crestor and said that she is having some leg cramps so she stopped taking it and would like to know if she could try something else

## 2018-06-26 ENCOUNTER — TELEPHONE (OUTPATIENT)
Dept: FAMILY MEDICINE CLINIC | Facility: CLINIC | Age: 53
End: 2018-06-26

## 2018-06-26 NOTE — TELEPHONE ENCOUNTER
Patient said that her crestor was only 5mg & lipitor was  Send in at 20mg. Can this be resent in as 5mg?

## 2018-06-27 NOTE — TELEPHONE ENCOUNTER
lipitor isn't as potent as crestor therefore a higher dosage is needed.  She can break the lipitor 20 mg in half and take a half qd and recheck labs in 6-8 weeks.  Or she could do 1/2 of the 20 mg qod and recheck in 6-8 weeks

## 2018-07-24 ENCOUNTER — TELEPHONE (OUTPATIENT)
Dept: FAMILY MEDICINE CLINIC | Facility: CLINIC | Age: 53
End: 2018-07-24

## 2018-07-26 ENCOUNTER — CLINICAL SUPPORT (OUTPATIENT)
Dept: FAMILY MEDICINE CLINIC | Facility: CLINIC | Age: 53
End: 2018-07-26

## 2018-07-26 DIAGNOSIS — Z23 IMMUNIZATION DUE: Primary | ICD-10-CM

## 2018-07-26 PROCEDURE — 90715 TDAP VACCINE 7 YRS/> IM: CPT | Performed by: INTERNAL MEDICINE

## 2018-07-26 PROCEDURE — 90471 IMMUNIZATION ADMIN: CPT | Performed by: INTERNAL MEDICINE

## 2018-09-04 RX ORDER — ATORVASTATIN CALCIUM 20 MG/1
TABLET, FILM COATED ORAL
Qty: 30 TABLET | Refills: 2 | Status: SHIPPED | OUTPATIENT
Start: 2018-09-04 | End: 2019-05-15 | Stop reason: SDUPTHER

## 2018-11-12 ENCOUNTER — TELEPHONE (OUTPATIENT)
Dept: FAMILY MEDICINE CLINIC | Facility: CLINIC | Age: 53
End: 2018-11-12

## 2018-11-12 ENCOUNTER — TRANSCRIBE ORDERS (OUTPATIENT)
Dept: ADMINISTRATIVE | Facility: HOSPITAL | Age: 53
End: 2018-11-12

## 2018-11-12 DIAGNOSIS — Z12.31 SCREENING MAMMOGRAM, ENCOUNTER FOR: Primary | ICD-10-CM

## 2018-11-12 NOTE — TELEPHONE ENCOUNTER
PT CALLED to see if you need to see her before she refills her bp meds? She is almost out last seen 3/18. Send to [facility]

## 2018-11-13 NOTE — TELEPHONE ENCOUNTER
Do not need to see her prior to refills.  She does need to make an appointment however in the upcoming months.  Which meds does she need refilled and to what pharmacy?

## 2018-11-19 RX ORDER — LISINOPRIL 20 MG/1
TABLET ORAL
Qty: 30 TABLET | Refills: 5 | Status: SHIPPED | OUTPATIENT
Start: 2018-11-19 | End: 2019-05-15 | Stop reason: SDUPTHER

## 2018-11-19 RX ORDER — TRAZODONE HYDROCHLORIDE 50 MG/1
TABLET ORAL
Qty: 30 TABLET | Refills: 5 | Status: SHIPPED | OUTPATIENT
Start: 2018-11-19 | End: 2019-12-23

## 2019-01-23 ENCOUNTER — OFFICE VISIT (OUTPATIENT)
Dept: OBSTETRICS AND GYNECOLOGY | Facility: CLINIC | Age: 54
End: 2019-01-23

## 2019-01-23 VITALS
DIASTOLIC BLOOD PRESSURE: 82 MMHG | WEIGHT: 181 LBS | BODY MASS INDEX: 29.09 KG/M2 | HEIGHT: 66 IN | SYSTOLIC BLOOD PRESSURE: 118 MMHG

## 2019-01-23 DIAGNOSIS — Z13.9 SCREENING FOR CONDITION: Primary | ICD-10-CM

## 2019-01-23 DIAGNOSIS — Z01.419 WELL WOMAN EXAM WITH ROUTINE GYNECOLOGICAL EXAM: ICD-10-CM

## 2019-01-23 PROCEDURE — 81002 URINALYSIS NONAUTO W/O SCOPE: CPT | Performed by: OBSTETRICS & GYNECOLOGY

## 2019-01-23 PROCEDURE — 99396 PREV VISIT EST AGE 40-64: CPT | Performed by: OBSTETRICS & GYNECOLOGY

## 2019-01-23 NOTE — PROGRESS NOTES
GYN Annual Exam     CC- Here for annual exam.     Julisa Nixon is a 53 y.o. female established patient who presents for annual well woman exam. NO  VB. She has 2 new grandsons and one lives here in town. Her daughter delivered at [place] and pushed for 5 hours. She had a vaginal delivery but had a pelvic fracture and 6 months out is still suffering with pain. Julisa is watching her grandson a lot and is very tired from having a young child in her care.       OB History      Para Term  AB Living    2 2 2     2    SAB TAB Ectopic Molar Multiple Live Births                       Obstetric Comments    2           Menarche:11  Menopause:44  HRT:none  Current contraception: status post hysterectomy- s/p TLH with OC for DUB age 44  History of abnormal Pap smear: no  History of abnormal mammogram: yes - B9 cyst bx  Family history of uterine, colon or ovarian cancer: yes - PGF and P aunt  Family history of breast cancer: no  STD's: none  Last pap smear:17- nl x 2      Health Maintenance   Topic Date Due   • ZOSTER VACCINE (1 of 2) 2013   • HEPATITIS C SCREENING  07/15/2016   • INFLUENZA VACCINE  2018   • ANNUAL PHYSICAL  2018   • LIPID PANEL  2019   • MAMMOGRAM  10/23/2019   • PAP SMEAR  2020   • COLONOSCOPY  2024   • TDAP/TD VACCINES (2 - Td) 2028       Past Medical History:   Diagnosis Date   • Hypertension        Past Surgical History:   Procedure Laterality Date   • BREAST BIOPSY     • COLONOSCOPY     • HYSTERECTOMY      TLH with OC age 44 for DUB         Current Outpatient Medications:   •  amoxicillin-clavulanate (AUGMENTIN) 875-125 MG per tablet, Take 1 tablet by mouth 2 (Two) Times a Day., Disp: 20 tablet, Rfl: 0  •  atorvastatin (LIPITOR) 20 MG tablet, TAKE ONE TABLET BY MOUTH DAILY, Disp: 30 tablet, Rfl: 2  •  erythromycin (ROMYCIN) 5 MG/GM ophthalmic ointment, Administer  to the right eye Every Night., Disp: 1 each, Rfl: 0  •  lisinopril  "(PRINIVIL,ZESTRIL) 20 MG tablet, TAKE ONE TABLET BY MOUTH DAILY, Disp: 30 tablet, Rfl: 5  •  traZODone (DESYREL) 50 MG tablet, TAKE ONE TABLET BY MOUTH ONCE NIGHTLY AS NEEDED FOR SLEEP, Disp: 30 tablet, Rfl: 5    No Known Allergies    Social History     Tobacco Use   • Smoking status: Never Smoker   • Smokeless tobacco: Never Used   Substance Use Topics   • Alcohol use: No   • Drug use: No       Family History   Problem Relation Age of Onset   • Stroke Father    • Colon cancer Paternal Grandfather    • Colon cancer Paternal Aunt    • Breast cancer Neg Hx    • Ovarian cancer Neg Hx        Review of Systems   Constitutional: Positive for fatigue. Negative for appetite change, fever and unexpected weight change.   Respiratory: Negative for cough and shortness of breath.    Cardiovascular: Negative for chest pain and palpitations.   Gastrointestinal: Negative for abdominal distention, abdominal pain, constipation, diarrhea and nausea.   Endocrine: Negative for cold intolerance and heat intolerance.   Genitourinary: Negative for dyspareunia, dysuria, menstrual problem, pelvic pain and vaginal discharge.   Musculoskeletal: Positive for neck pain (from lifting grandson).   Skin: Negative for color change and rash.   Neurological: Negative for headaches.   Psychiatric/Behavioral: Negative for dysphoric mood. The patient is not nervous/anxious.        /82   Ht 167.6 cm (66\")   Wt 82.1 kg (181 lb)   BMI 29.21 kg/m²     Physical Exam   Constitutional: She is oriented to person, place, and time. She appears well-developed and well-nourished.   HENT:   Head: Normocephalic and atraumatic.   Eyes: Conjunctivae are normal. No scleral icterus.   Neck: Neck supple. No thyromegaly present.   Cardiovascular: Normal rate and regular rhythm.   Pulmonary/Chest: Effort normal and breath sounds normal. Right breast exhibits no inverted nipple, no mass, no nipple discharge, no skin change and no tenderness. Left breast exhibits no " inverted nipple, no mass, no nipple discharge, no skin change and no tenderness.   Abdominal: Soft. Bowel sounds are normal. She exhibits no distension and no mass. There is no tenderness. There is no rebound and no guarding. No hernia.   Genitourinary: Pelvic exam was performed with patient supine. There is no rash, tenderness or lesion on the right labia. There is no rash, tenderness or lesion on the left labia. Right adnexum displays no mass, no tenderness and no fullness. Left adnexum displays no mass, no tenderness and no fullness. No erythema, tenderness or bleeding in the vagina. No foreign body in the vagina. No signs of injury around the vagina. No vaginal discharge found.   Genitourinary Comments: Normal cuff   Neurological: She is alert and oriented to person, place, and time.   Skin: Skin is warm and dry.   Psychiatric: She has a normal mood and affect. Her behavior is normal. Judgment and thought content normal.   Nursing note and vitals reviewed.         Assessment/Plan    1) GYN HM: check pap/HPV   SBE demonstrated and encouraged.  2) STD screening: declines Condoms encouraged.  3) Bone health - Weight bearing exercise, dietary calcium recommendations and vitamin D reviewed.   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    Diagnoses and all orders for this visit:    Screening for condition  -     POC Urinalysis Dipstick  -     DEXA Bone Density Axial; Future    Well woman exam with routine gynecological exam  -     Pap IG, HPV-hr        Ariana Salcedo MD  1/23/2019  2:52 PM

## 2019-01-31 ENCOUNTER — HOSPITAL ENCOUNTER (OUTPATIENT)
Dept: MAMMOGRAPHY | Facility: HOSPITAL | Age: 54
Discharge: HOME | End: 2019-01-31
Attending: OBSTETRICS & GYNECOLOGY | Admitting: OBSTETRICS & GYNECOLOGY

## 2019-01-31 DIAGNOSIS — Z12.31 SCREENING MAMMOGRAM, ENCOUNTER FOR: ICD-10-CM

## 2019-01-31 PROCEDURE — 77063 BREAST TOMOSYNTHESIS BI: CPT

## 2019-01-31 PROCEDURE — 77067 SCR MAMMO BI INCL CAD: CPT

## 2019-02-26 ENCOUNTER — HOSPITAL ENCOUNTER (OUTPATIENT)
Dept: BONE DENSITY | Facility: HOSPITAL | Age: 54
Discharge: HOME | End: 2019-02-26
Attending: OBSTETRICS & GYNECOLOGY | Admitting: OBSTETRICS & GYNECOLOGY

## 2019-02-26 ENCOUNTER — OFFICE VISIT (OUTPATIENT)
Dept: FAMILY MEDICINE CLINIC | Facility: CLINIC | Age: 54
End: 2019-02-26

## 2019-02-26 VITALS
TEMPERATURE: 98.5 F | WEIGHT: 179.8 LBS | HEART RATE: 81 BPM | BODY MASS INDEX: 28.9 KG/M2 | SYSTOLIC BLOOD PRESSURE: 156 MMHG | DIASTOLIC BLOOD PRESSURE: 92 MMHG | HEIGHT: 66 IN | OXYGEN SATURATION: 95 %

## 2019-02-26 DIAGNOSIS — H61.21 IMPACTED CERUMEN, RIGHT EAR: ICD-10-CM

## 2019-02-26 DIAGNOSIS — M47.818 ARTHROPATHY OF RIGHT SACROILIAC JOINT: ICD-10-CM

## 2019-02-26 DIAGNOSIS — M54.2 NECK PAIN ON RIGHT SIDE: ICD-10-CM

## 2019-02-26 DIAGNOSIS — Z13.9 SCREENING FOR CONDITION: ICD-10-CM

## 2019-02-26 DIAGNOSIS — R73.9 HYPERGLYCEMIA: ICD-10-CM

## 2019-02-26 DIAGNOSIS — I10 ESSENTIAL HYPERTENSION: Primary | ICD-10-CM

## 2019-02-26 DIAGNOSIS — E78.5 HYPERLIPIDEMIA, UNSPECIFIED HYPERLIPIDEMIA TYPE: ICD-10-CM

## 2019-02-26 DIAGNOSIS — R59.9 ENLARGED LYMPH NODE: ICD-10-CM

## 2019-02-26 PROCEDURE — 99214 OFFICE O/P EST MOD 30 MIN: CPT | Performed by: INTERNAL MEDICINE

## 2019-02-26 PROCEDURE — 77080 DXA BONE DENSITY AXIAL: CPT

## 2019-02-26 RX ORDER — METHYLPREDNISOLONE 4 MG/1
TABLET ORAL
Qty: 21 TABLET | Refills: 0 | Status: SHIPPED | OUTPATIENT
Start: 2019-02-26 | End: 2019-04-01

## 2019-02-26 NOTE — PROGRESS NOTES
Subjective   Julisa Nixon is a 53 y.o. female who comes in today for   Chief Complaint   Patient presents with   • Follow-up     med check and she is fasting today. she does watch her grandbabies   • Shoulder Pain     right neck/shoulder/back also lymph nodes swollen  wondering if lab could show anything on the inflamation    • Muscle Pain     she stopped her chol med thinking it was it   .    History of Present Illness   Right shoulder and right SI joint that started after carrying 6 mo grandson  Went to [place] and got worse with walking and the rides.  Once she got home, she carried her grandson for 3 days and it got worse.  Hurts to walk and burning pain in the inner thigh.  Some ha's as well and feels tired.  Feels like her joints are inflammed.  Taking advil and which helps with pain and decreases the HA.  Total time bothering her is 2 weeks.  Had this a few mths ago and it got better with advil and resting.  No mouth infections or teeth issues.  No ST or ear ache.   HL on lipitor but she stopped it b/c of the aches and pains in her joints recently.  She restarted it yesterday .  On lisinopril for HTN.    The following portions of the patient's history were reviewed and updated as appropriate: allergies, current medications, past family history, past medical history, past social history, past surgical history and problem list.    Review of Systems   Constitutional: Negative for fever.   Musculoskeletal: Positive for arthralgias, back pain and gait problem.       Vitals:    02/26/19 1258   BP: 156/92   Pulse: 81   Temp: 98.5 °F (36.9 °C)   SpO2: 95%       Objective   Physical Exam   Constitutional: She is oriented to person, place, and time. She appears well-developed and well-nourished.   HENT:   Head: Normocephalic and atraumatic.   Right Ear: External ear normal.   Left Ear: External ear normal.   Mouth/Throat: Oropharynx is clear and moist.   Eyes: Conjunctivae are normal.   Neck: Neck supple.    Cardiovascular: Normal rate, regular rhythm and normal heart sounds.   No bruits   Pulmonary/Chest: Effort normal and breath sounds normal. No respiratory distress. She has no wheezes. She has no rales.   Abdominal: Soft. Bowel sounds are normal. She exhibits no distension and no mass. There is no tenderness.   Lymphadenopathy:     She has cervical adenopathy (right preauricular and cervical junction LN that is mobile).   Neurological: She is alert and oriented to person, place, and time.   Skin: Skin is warm.   Psychiatric: She has a normal mood and affect. Her behavior is normal. Judgment and thought content normal.   Nursing note and vitals reviewed.        Current Outpatient Medications:   •  atorvastatin (LIPITOR) 20 MG tablet, TAKE ONE TABLET BY MOUTH DAILY, Disp: 30 tablet, Rfl: 2  •  lisinopril (PRINIVIL,ZESTRIL) 20 MG tablet, TAKE ONE TABLET BY MOUTH DAILY, Disp: 30 tablet, Rfl: 5  •  traZODone (DESYREL) 50 MG tablet, TAKE ONE TABLET BY MOUTH ONCE NIGHTLY AS NEEDED FOR SLEEP, Disp: 30 tablet, Rfl: 5  •  methylPREDNISolone (MEDROL, QIAN,) 4 MG tablet, Take as directed on package instructions., Disp: 21 tablet, Rfl: 0    Assessment/Plan   Julisa was seen today for follow-up, shoulder pain and muscle pain.    Diagnoses and all orders for this visit:    Essential hypertension  -     Comprehensive Metabolic Panel; Future  -     Lipid Panel With LDL / HDL Ratio; Future    Hyperlipidemia, unspecified hyperlipidemia type  -     Comprehensive Metabolic Panel; Future  -     Lipid Panel With LDL / HDL Ratio; Future    Neck pain on right side  -     Ambulatory Referral to Physical Therapy Evaluate and treat    Hyperglycemia    Arthropathy of right sacroiliac joint  -     Ambulatory Referral to Physical Therapy Evaluate and treat    Enlarged lymph node  -     Ambulatory Referral to ENT (Otolaryngology)    Impacted cerumen, right ear    Other orders  -     methylPREDNISolone (MEDROL, QIAN,) 4 MG tablet; Take as directed  on package instructions.    steroid pack to reduce inflammtion in neck and right SI joint  PT to work on neck, shoulder and Right SI joint and hopefully can do dry needling  Refer to ENT regarding the mass like area at the jaw line/mandible preauricular area ? LN.    Check her labs due to HTN , HL and hyperglycemia  Continue current meds: lipitor for hl , lisinorpil for HTN and trazodone for insomnia               I have asked for the patient to return to clinic in 6month(s).

## 2019-03-07 ENCOUNTER — OFFICE VISIT (OUTPATIENT)
Dept: FAMILY MEDICINE CLINIC | Facility: CLINIC | Age: 54
End: 2019-03-07

## 2019-03-07 VITALS
DIASTOLIC BLOOD PRESSURE: 95 MMHG | HEIGHT: 66 IN | SYSTOLIC BLOOD PRESSURE: 148 MMHG | HEART RATE: 105 BPM | BODY MASS INDEX: 28.61 KG/M2 | WEIGHT: 178 LBS | OXYGEN SATURATION: 95 % | TEMPERATURE: 97.4 F

## 2019-03-07 DIAGNOSIS — I10 ESSENTIAL HYPERTENSION: ICD-10-CM

## 2019-03-07 DIAGNOSIS — H69.83 EUSTACHIAN TUBE DYSFUNCTION, BILATERAL: Primary | ICD-10-CM

## 2019-03-07 PROCEDURE — 99213 OFFICE O/P EST LOW 20 MIN: CPT | Performed by: NURSE PRACTITIONER

## 2019-03-07 NOTE — PROGRESS NOTES
Subjective   Julisa Nixon is a 53 y.o. female.     Patient complains ear pressure popping  Will be flying soon once to make sure ears are okay  No fever chills headaches dizziness  No cough or other congestion  Nothing makes better or worse essential hypertension controlled          Otitis Media   Pertinent negatives include no chills or fever.        The following portions of the patient's history were reviewed and updated as appropriate: allergies, current medications, past family history, past medical history, past social history, past surgical history and problem list.    Review of Systems   Constitutional: Negative for chills and fever.   HENT: Positive for rhinorrhea and sinus pressure.         Sinus pressure ear popping   All other systems reviewed and are negative.      Objective   Physical Exam   Constitutional: She is oriented to person, place, and time. She appears well-developed and well-nourished. No distress.   HENT:   Head: Normocephalic.   Turbinates quite congested TMs are dull TMJ normal  Teeth nontender no oral abscess  No facial swelling no tragus tenderness canal clear no redness   Eyes: Conjunctivae are normal. Pupils are equal, round, and reactive to light.   Neck: Neck supple.   Cardiovascular: Exam reveals no friction rub.   No murmur heard.  Pulmonary/Chest: Effort normal. No respiratory distress. She has no wheezes.   Musculoskeletal: She exhibits no edema.   Neurological: She is alert and oriented to person, place, and time.   Skin: Skin is warm and dry.   Psychiatric: She has a normal mood and affect. Her behavior is normal. Judgment and thought content normal.   Vitals reviewed.        Assessment/Plan   Julisa was seen today for otitis media.    Diagnoses and all orders for this visit:    Eustachian tube dysfunction, bilateral    Essential hypertension                      Generally avoid decongestants with the exception of Afrin nasal spray   Before flying only as needed  Limited  use to avoid hypertension and rebound    Patient should avoid decongestants  Due to hypertension      Discharge instructions  Flonase twice a day    If ear discomfort popping, the day of your airflight  Then use Afrin nasal spray 2 sprays each nostril  The day of your flight, and the day return only  With caution may increase blood pressure    Recheck blood pressure at home if is still elevated call  Follow-up Dr. Ferreira

## 2019-03-07 NOTE — PATIENT INSTRUCTIONS
Discharge instructions  Flonase twice a day    If ear discomfort popping, the day of your airflight  Then use Afrin nasal spray 2 sprays each nostril  The day of your flight, and the day return only  With caution may increase blood pressure    Recheck blood pressure at home if is still elevated call  Follow-up Dr. Ferreira      Eustachian Tube Dysfunction  The eustachian tube connects the middle ear to the back of the nose. It regulates air pressure in the middle ear by allowing air to move between the ear and nose. It also helps to drain fluid from the middle ear space. When the eustachian tube does not function properly, air pressure, fluid, or both can build up in the middle ear.  Eustachian tube dysfunction can affect one or both ears.  What are the causes?  This condition happens when the eustachian tube becomes blocked or cannot open normally. This may result from:  · Ear infections.  · Colds and other upper respiratory infections.  · Allergies.  · Irritation, such as from cigarette smoke or acid from the stomach coming up into the esophagus (gastroesophageal reflux).  · Sudden changes in air pressure, such as from descending in an airplane.  · Abnormal growths in the nose or throat, such as nasal polyps, tumors, or enlarged tissue at the back of the throat (adenoids).    What increases the risk?  This condition may be more likely to develop in people who smoke and people who are overweight. Eustachian tube dysfunction may also be more likely to develop in children, especially children who have:  · Certain birth defects of the mouth, such as cleft palate.  · Large tonsils and adenoids.    What are the signs or symptoms?  Symptoms of this condition may include:  · A feeling of fullness in the ear.  · Ear pain.  · Clicking or popping noises in the ear.  · Ringing in the ear.  · Hearing loss.  · Loss of balance.    Symptoms may get worse when the air pressure around you changes, such as when you travel to an area of  "high elevation or fly on an airplane.  How is this diagnosed?  This condition may be diagnosed based on:  · Your symptoms.  · A physical exam of your ear, nose, and throat.  · Tests, such as those that measure:  ? The movement of your eardrum (tympanogram).  ? Your hearing (audiometry).    How is this treated?  Treatment depends on the cause and severity of your condition. If your symptoms are mild, you may be able to relieve your symptoms by moving air into (\"popping\") your ears. If you have symptoms of fluid in your ears, treatment may include:  · Decongestants.  · Antihistamines.  · Nasal sprays or ear drops that contain medicines that reduce swelling (steroids).    In some cases, you may need to have a procedure to drain the fluid in your eardrum (myringotomy). In this procedure, a small tube is placed in the eardrum to:  · Drain the fluid.  · Restore the air in the middle ear space.    Follow these instructions at home:  · Take over-the-counter and prescription medicines only as told by your health care provider.  · Use techniques to help pop your ears as recommended by your health care provider. These may include:  ? Chewing gum.  ? Yawning.  ? Frequent, forceful swallowing.  ? Closing your mouth, holding your nose closed, and gently blowing as if you are trying to blow air out of your nose.  · Do not do any of the following until your health care provider approves:  ? Travel to high altitudes.  ? Fly in airplanes.  ? Work in a pressurized cabin or room.  ? Scuba dive.  · Keep your ears dry. Dry your ears completely after showering or bathing.  · Do not smoke.  · Keep all follow-up visits as told by your health care provider. This is important.  Contact a health care provider if:  · Your symptoms do not go away after treatment.  · Your symptoms come back after treatment.  · You are unable to pop your ears.  · You have:  ? A fever.  ? Pain in your ear.  ? Pain in your head or neck.  ? Fluid draining from your " ear.  · Your hearing suddenly changes.  · You become very dizzy.  · You lose your balance.  This information is not intended to replace advice given to you by your health care provider. Make sure you discuss any questions you have with your health care provider.  Document Released: 01/13/2017 Document Revised: 05/25/2017 Document Reviewed: 01/06/2016  [employer] Interactive Patient Education © 2018 [employer]

## 2019-03-27 ENCOUNTER — TRANSCRIBE ORDERS (OUTPATIENT)
Dept: ADMINISTRATIVE | Facility: HOSPITAL | Age: 54
End: 2019-03-27

## 2019-03-27 DIAGNOSIS — R59.9 ENLARGED LYMPH NODE: Primary | ICD-10-CM

## 2019-03-28 ENCOUNTER — RESULTS ENCOUNTER (OUTPATIENT)
Dept: FAMILY MEDICINE CLINIC | Facility: CLINIC | Age: 54
End: 2019-03-28

## 2019-03-28 DIAGNOSIS — I10 ESSENTIAL HYPERTENSION: ICD-10-CM

## 2019-03-28 DIAGNOSIS — E78.5 HYPERLIPIDEMIA, UNSPECIFIED HYPERLIPIDEMIA TYPE: ICD-10-CM

## 2019-04-01 ENCOUNTER — OFFICE VISIT (OUTPATIENT)
Dept: FAMILY MEDICINE CLINIC | Facility: CLINIC | Age: 54
End: 2019-04-01

## 2019-04-01 VITALS
DIASTOLIC BLOOD PRESSURE: 92 MMHG | RESPIRATION RATE: 18 BRPM | BODY MASS INDEX: 28.45 KG/M2 | HEIGHT: 66 IN | WEIGHT: 177 LBS | HEART RATE: 99 BPM | OXYGEN SATURATION: 99 % | TEMPERATURE: 97 F | SYSTOLIC BLOOD PRESSURE: 149 MMHG

## 2019-04-01 DIAGNOSIS — K11.20 SALIVARY GLAND ADENITIS: ICD-10-CM

## 2019-04-01 DIAGNOSIS — M62.838 MUSCLE SPASM OF RIGHT SHOULDER: ICD-10-CM

## 2019-04-01 DIAGNOSIS — M25.511 ACUTE PAIN OF RIGHT SHOULDER: ICD-10-CM

## 2019-04-01 DIAGNOSIS — M54.2 NECK PAIN ON RIGHT SIDE: ICD-10-CM

## 2019-04-01 DIAGNOSIS — M25.511 ACUTE PAIN OF RIGHT SHOULDER: Primary | ICD-10-CM

## 2019-04-01 LAB
ALBUMIN SERPL-MCNC: 5 G/DL (ref 3.5–5.2)
ALBUMIN/GLOB SERPL: 1.7 G/DL
ALP SERPL-CCNC: 75 U/L (ref 39–117)
ALT SERPL-CCNC: 24 U/L (ref 1–33)
AST SERPL-CCNC: 17 U/L (ref 1–32)
BASOPHILS # BLD AUTO: 0.05 10*3/MM3 (ref 0–0.2)
BASOPHILS NFR BLD AUTO: 0.9 % (ref 0–1.5)
BILIRUB SERPL-MCNC: 0.3 MG/DL (ref 0.2–1.2)
BUN SERPL-MCNC: 13 MG/DL (ref 6–20)
BUN/CREAT SERPL: 16.9 (ref 7–25)
CALCIUM SERPL-MCNC: 10 MG/DL (ref 8.6–10.5)
CHLORIDE SERPL-SCNC: 102 MMOL/L (ref 98–107)
CHOLEST SERPL-MCNC: 235 MG/DL (ref 0–200)
CO2 SERPL-SCNC: 27.8 MMOL/L (ref 22–29)
CREAT SERPL-MCNC: 0.77 MG/DL (ref 0.57–1)
CRP SERPL-MCNC: 0.41 MG/DL (ref 0–0.5)
EOSINOPHIL # BLD AUTO: 0.06 10*3/MM3 (ref 0–0.4)
EOSINOPHIL NFR BLD AUTO: 1.1 % (ref 0.3–6.2)
ERYTHROCYTE [DISTWIDTH] IN BLOOD BY AUTOMATED COUNT: 14.1 % (ref 12.3–15.4)
ERYTHROCYTE [SEDIMENTATION RATE] IN BLOOD BY WESTERGREN METHOD: 10 MM/HR (ref 0–30)
GLOBULIN SER CALC-MCNC: 2.9 GM/DL
GLUCOSE SERPL-MCNC: 111 MG/DL (ref 65–99)
HCT VFR BLD AUTO: 46.8 % (ref 34–46.6)
HDLC SERPL-MCNC: 57 MG/DL (ref 40–60)
HGB BLD-MCNC: 14.5 G/DL (ref 12–15.9)
IMM GRANULOCYTES # BLD AUTO: 0.01 10*3/MM3 (ref 0–0.05)
IMM GRANULOCYTES NFR BLD AUTO: 0.2 % (ref 0–0.5)
LDLC SERPL CALC-MCNC: 161 MG/DL (ref 0–100)
LDLC/HDLC SERPL: 2.82 {RATIO}
LYMPHOCYTES # BLD AUTO: 2.22 10*3/MM3 (ref 0.7–3.1)
LYMPHOCYTES NFR BLD AUTO: 40.7 % (ref 19.6–45.3)
MCH RBC QN AUTO: 28.5 PG (ref 26.6–33)
MCHC RBC AUTO-ENTMCNC: 31 G/DL (ref 31.5–35.7)
MCV RBC AUTO: 91.9 FL (ref 79–97)
MONOCYTES # BLD AUTO: 0.4 10*3/MM3 (ref 0.1–0.9)
MONOCYTES NFR BLD AUTO: 7.3 % (ref 5–12)
NEUTROPHILS # BLD AUTO: 2.71 10*3/MM3 (ref 1.4–7)
NEUTROPHILS NFR BLD AUTO: 49.8 % (ref 42.7–76)
NRBC BLD AUTO-RTO: 0 /100 WBC (ref 0–0)
PLATELET # BLD AUTO: 418 10*3/MM3 (ref 140–450)
POTASSIUM SERPL-SCNC: 4.8 MMOL/L (ref 3.5–5.2)
PROT SERPL-MCNC: 7.9 G/DL (ref 6–8.5)
RBC # BLD AUTO: 5.09 10*6/MM3 (ref 3.77–5.28)
SODIUM SERPL-SCNC: 142 MMOL/L (ref 136–145)
TRIGL SERPL-MCNC: 86 MG/DL (ref 0–150)
VLDLC SERPL CALC-MCNC: 17.2 MG/DL
WBC # BLD AUTO: 5.45 10*3/MM3 (ref 3.4–10.8)

## 2019-04-01 PROCEDURE — 99214 OFFICE O/P EST MOD 30 MIN: CPT | Performed by: NURSE PRACTITIONER

## 2019-04-01 RX ORDER — CYCLOBENZAPRINE HCL 10 MG
10 TABLET ORAL NIGHTLY PRN
Qty: 30 TABLET | Refills: 0 | Status: SHIPPED | OUTPATIENT
Start: 2019-04-01 | End: 2019-05-15

## 2019-04-01 NOTE — PATIENT INSTRUCTIONS
May try topical for pain relief such as salonpas makes patches which work well or the ointment.  Also can try lidocaine, biofreeze etc.  Ask your pharmacist for a recommendation      Musculoskeletal Pain  Musculoskeletal pain refers to aches and pains in your bones, joints, muscles, and the tissues that surround them. This pain can occur in any part of the body. It can last for a short time (acute) or a long time (chronic).  A physical exam, lab tests, and imaging studies may be done to find the cause of your musculoskeletal pain.  Follow these instructions at home:  Lifestyle  · Try to control or lower your stress levels. Stress increases muscle tension and can worsen musculoskeletal pain. It is important to recognize when you are anxious or stressed and learn ways to manage it. This may include:  ? Meditation or yoga.  ? Cognitive or behavioral therapy.  ? Acupuncture or massage therapy.  · You may continue all activities unless the activities cause more pain. When the pain gets better, slowly resume your normal activities. Gradually increase the intensity and duration of your activities or exercise.  Managing pain, stiffness, and swelling  · Take over-the-counter and prescription medicines only as told by your health care provider.  · When your pain is severe, bed rest may be helpful. Lie or sit in any position that is comfortable, but get out of bed and walk around at least every couple of hours.  · If directed, apply heat to the affected area as often as told by your health care provider. Use the heat source that your health care provider recommends, such as a moist heat pack or a heating pad.  ? Place a towel between your skin and the heat source.  ? Leave the heat on for 20-30 minutes.  ? Remove the heat if your skin turns bright red. This is especially important if you are unable to feel pain, heat, or cold. You may have a greater risk of getting burned.  · If directed, put ice on the painful area.  ? Put  ice in a plastic bag.  ? Place a towel between your skin and the bag.  ? Leave the ice on for 20 minutes, 2-3 times a day.  General instructions    · Your health care provider may recommend that you see a physical therapist. This person can help you come up with a safe exercise program. Do any exercises as told by your physical therapist.  · Keep all follow-up visits, including any physical therapy visits, as told by your health care providers. This is important.  Contact a health care provider if:  · Your pain gets worse.  · Medicines do not help ease your pain.  · You cannot use the part of your body that hurts, such as your arm, leg, or neck.  · You have trouble sleeping.  · You have trouble doing your normal activities.  Get help right away if:  · You have a new injury and your pain is worse or different.  · You feel numb or you have tingling in the painful area.  Summary  · Musculoskeletal pain refers to aches and pains in your bones, joints, muscles, and the tissues that surround them.  · This pain can occur in any part of the body.  · Your health care provider may recommend that you see a physical therapist. This person can help you come up with a safe exercise program. Do any exercises as told by your physical therapist.  · Lower your stress level. Stress can worsen musculoskeletal pain. Ways to lower stress may include meditation, yoga, cognitive or behavioral therapy, acupuncture, and massage therapy.  This information is not intended to replace advice given to you by your health care provider. Make sure you discuss any questions you have with your health care provider.  Document Released: 12/18/2006 Document Revised: 01/17/2018 Document Reviewed: 01/17/2018  [employer] Interactive Patient Education © 2019 [employer]

## 2019-04-01 NOTE — PROGRESS NOTES
Subjective   Julisa Nixon is a 53 y.o. female.     Chief Complaint   Patient presents with   • Neck Pain     from behind ear through neck & shoulder, ongoing last few months after caring for grandchild.      HPI patient is new to me.  She is here with a friend.  She is here for ongoing right neck and shoulder pain which is not really improved with OTC pain relievers.  This apparently has been going on for several months without relief.  She was previously evaluated for this by her PCP here in this office and given a Medrol Dosepak however she only took half of it as she developed a cold and was worried that it was due to the prednisone lowering her immune system which she read was a side effect.  Pain in her right neck is located in her neck across her shoulder,  radiates into her anterior upper chest and down mid back around the scapula.  She has severe pain when she tries to sleep or move, or turn her head.  She was really worried that it is connected to her salivary gland and that she has an infection of her shoulder.  She has been seen by her son-in-law who is an infectious disease doctor and is recommending that she have labs done to rule out an infection in her shoulder.  She is very focused on getting this lab work done so that she can rest her mind while she goes on vacation.  She has never had pain like this before.  She considers it a 10 out of 10 most of the time.    Does note that prior to this starting she had been caring for her grandson who is around 7 months old.  She has been lifting and caring him a great deal.  She is not always follow proper back care as well as discussed with her by PT when she had a recent episode of right sciatic back pain.  Has mostly resolved.    She has been to ENT and is to have an MRI of her right neck enlarged salivary gland on Thursday.  There was also an ultrasound of her salivary gland ordered however she is going on vacation next week and is postponed it until  "she returns.  This was a sudden problem that was noted upon returning from her last trip.  She has never had this before.  It is not as bothersome as her neck and shoulder pain.  She is however anxious about the MRI and making sure that is an open MRI.    Social History     Tobacco Use   • Smoking status: Never Smoker   • Smokeless tobacco: Never Used   Substance Use Topics   • Alcohol use: No   • Drug use: No       The following portions of the patient's history were reviewed and updated as appropriate: allergies, current medications, past family history, past medical history, past social history, past surgical history and problem list.    Review of Systems   Constitutional: Negative for chills, fatigue and fever.   HENT: Negative for congestion, ear pain (Resolved), rhinorrhea, sore throat (Resolved) and trouble swallowing.    Respiratory: Negative for cough.    Cardiovascular: Negative for chest pain and palpitations.   Gastrointestinal: Negative for abdominal pain, diarrhea, nausea and vomiting.   Musculoskeletal: Positive for back pain, myalgias, neck pain and neck stiffness.   Neurological: Negative for headaches.   Psychiatric/Behavioral: Negative for sleep disturbance (Takes a trazodone only a couple times per month at the most for insomnia).       Objective   Blood pressure 149/92, pulse 99, temperature 97 °F (36.1 °C), resp. rate 18, height 167.6 cm (66\"), weight 80.3 kg (177 lb), SpO2 99 %.    Physical Exam   Constitutional: She is oriented to person, place, and time. She appears well-developed and well-nourished. No distress.   HENT:   Head: Normocephalic and atraumatic.   Right Ear: Tympanic membrane, external ear and ear canal normal.   Left Ear: Tympanic membrane, external ear and ear canal normal.   Mouth/Throat: Uvula is midline and oropharynx is clear and moist. No oropharyngeal exudate or posterior oropharyngeal erythema.   Eyes: Conjunctivae are normal. Right eye exhibits no discharge. Left eye " exhibits no discharge.   Cardiovascular: Regular rhythm and normal heart sounds.   Pulmonary/Chest: Effort normal and breath sounds normal.   Abdominal: Soft. Bowel sounds are normal. There is no tenderness.   Musculoskeletal: She exhibits tenderness. She exhibits no deformity.   Right neck, trapezius, posterior upper back medial to scapula muscles are very tender to palpation.  Patient's range of motion is limited due to muscle spasms and tenderness.  Normal shoulder exam.   Lymphadenopathy:     She has cervical adenopathy (Right salivary enlarged lymph node which is tender to palpation).   Neurological: She is alert and oriented to person, place, and time.   Skin: Skin is warm and dry.   Psychiatric: Her mood appears anxious. Her speech is rapid and/or pressured.   Patient is extremely anxious about her recent health problems, was speaking rapidly and was disjointed in her history initially.  She was frequently jumping back and forth between her salivary gland problem and her muscle spasms, as well as what her son-in-law had told her.  Friend was very helpful in helping her slow down and discuss her recent health problems.      After explanation and discussion of muscle spasms patient did seem less anxious   Nursing note and vitals reviewed.      Assessment   Problem List Items Addressed This Visit        Nervous and Auditory    Neck pain on right side    Relevant Medications    cyclobenzaprine (FLEXERIL) 10 MG tablet      Other Visit Diagnoses     Acute pain of right shoulder    -  Primary    Relevant Orders    Sedimentation Rate    CBC & Differential    C-reactive protein    Salivary gland adenitis        Relevant Orders    Sedimentation Rate    CBC & Differential    C-reactive protein    Muscle spasm of right shoulder               Procedures           Impression and Plan:  Cyclobenzaprine at HS for neck and shoulder pain.  Will get labs per patient request.  I did review most recent records from PCP.  We also  reviewed her most recent blood work.  I discussed management of neck and shoulder pain as well as gentle stretching and demonstrated various stretches that may help improve her pain.  I discussed the side effects of cyclobenzaprine and did tell her to avoid taking it if she is taking the trazodone which she takes very infrequently first insomnia.  Also discussed with her that the labs that she is requesting are nonspecific for infection and commonly ordered by infectious disease to monitor for infection however this C RP and sed rate monitor inflammation only and not just infection.  And his CBC is not always accurate for low-grade infection.  We also discussed topical medications that may give her some relief.  Also reviewed the ENT note and the orders and explained to the best of my ability to the patient who is extremely anxious about all of this pain as she normally does not get sick.      Health Maintenance Due   Topic Date Due   • HEPATITIS C SCREENING  07/15/2016   • ANNUAL PHYSICAL  12/14/2018   • LIPID PANEL  03/20/2019

## 2019-04-05 ENCOUNTER — HOSPITAL ENCOUNTER (OUTPATIENT)
Dept: ULTRASOUND IMAGING | Facility: HOSPITAL | Age: 54
Discharge: HOME | End: 2019-04-05

## 2019-04-19 ENCOUNTER — HOSPITAL ENCOUNTER (OUTPATIENT)
Dept: ULTRASOUND IMAGING | Facility: HOSPITAL | Age: 54
Discharge: HOME | End: 2019-04-19
Admitting: OTOLARYNGOLOGY

## 2019-04-19 VITALS
TEMPERATURE: 98.5 F | OXYGEN SATURATION: 98 % | RESPIRATION RATE: 18 BRPM | SYSTOLIC BLOOD PRESSURE: 138 MMHG | WEIGHT: 177.03 LBS | BODY MASS INDEX: 28.45 KG/M2 | DIASTOLIC BLOOD PRESSURE: 88 MMHG | HEIGHT: 66 IN | HEART RATE: 90 BPM

## 2019-04-19 DIAGNOSIS — R59.9 ENLARGED LYMPH NODE: ICD-10-CM

## 2019-04-19 NOTE — DISCHARGE INSTRUCTIONS
EDUCATION /DISCHARGE INSTRUCTIONS  CT/US guided biopsy:  A biopsy is a procedure done to remove tissue for further analysis.  Before images are taken to locate the target area.  Images can be obtained using ultrasound, CT or MRI.  A physician will clean your skin with antiseptic soap, place a sterile towel around the site and administer a local anesthetic to numb the area.  The physician will then insert a special needle.  Sometimes images are taken of the needle after it is inserted to ensure the needle is in the correct area to be biopsied.   A sample is obtained and sent to the laboratory for study.  Occasionally the laboratory is unable to make a diagnosis from the sample and the procedure may need to be repeated.  Within a week the radiologist will send a report to your physician.  A pathologist will also examine the tissue and send a report.      Risks of the procedure include but are not limited to:   *  Bleeding    *  Infection   *  Puncture of surrounding organs *  Death     *  Lung collapse if the biopsy is near the chest which may require insertion of a       tube to re-inflate the lung if severe.      Benefits of the procedure:  Using x-ray helps to locate the area that requires a biopsy. The procedure is less invasive than a surgical procedure, there are no large incisions and it does not require anesthesia.      Alternatives to the procedure:  A biopsy can be performed surgically.  Risks of a surgical biopsy include exposure to anesthesia, infection, excessive bleeding and injury to abdominal organs.  A benefit of surgical biopsy is the ability to see the area to be biopsied and remove of a larger piece of tissue.    THIS EDUCATION INFORMATION WAS REVIEWED PRIOR TO PROCEDURE AND CONSENT. Patient initials__________________Time 0715    Post Procedure:    *  Expect the biopsy site may be tender up to one week.    *  Rest today (no pushing pulling or straining).   *  Slowly increase activity tomorrow.    *   If you received sedation do not drive for 24 hours.   *  Keep dressing clean and dry.   *  Leave dressing on puncture site for 24 hours.    *  You may shower when dressing removed.    Call your doctor if experiencing:   *  Signs of infection such as redness, swelling, excessive pain and / or foul        smelling drainage from the puncture site.   *  Chills or fever over 101 degrees (by mouth).   *  Unrelieved pain.   *  Any new or severe symptoms.   *  If experiencing sudden / severe shortness of breath or chest pain go to the       nearest emergency room.     Following the procedure:     Follow-up with the ordering physician as directed.    Continue to take other medications as directed by your physician unless    otherwise instructed.   If applicable, resume taking your blood thinners or Aspirin on 4/20/19    If you have any concerns please call the Radiology Nurses Desk at [phone].  You are the most important factor in your recovery.  Follow the above instructions carefully.

## 2019-05-15 ENCOUNTER — OFFICE VISIT (OUTPATIENT)
Dept: FAMILY MEDICINE CLINIC | Facility: CLINIC | Age: 54
End: 2019-05-15

## 2019-05-15 VITALS
DIASTOLIC BLOOD PRESSURE: 86 MMHG | HEART RATE: 100 BPM | SYSTOLIC BLOOD PRESSURE: 140 MMHG | WEIGHT: 179.6 LBS | TEMPERATURE: 99.2 F | BODY MASS INDEX: 28.87 KG/M2 | HEIGHT: 66 IN | OXYGEN SATURATION: 99 %

## 2019-05-15 DIAGNOSIS — G47.00 INSOMNIA, UNSPECIFIED TYPE: ICD-10-CM

## 2019-05-15 DIAGNOSIS — E78.5 HYPERLIPIDEMIA, UNSPECIFIED HYPERLIPIDEMIA TYPE: ICD-10-CM

## 2019-05-15 DIAGNOSIS — R73.9 HYPERGLYCEMIA: ICD-10-CM

## 2019-05-15 DIAGNOSIS — I10 ESSENTIAL HYPERTENSION: Primary | ICD-10-CM

## 2019-05-15 PROCEDURE — 99214 OFFICE O/P EST MOD 30 MIN: CPT | Performed by: INTERNAL MEDICINE

## 2019-05-15 RX ORDER — LISINOPRIL 30 MG/1
30 TABLET ORAL DAILY
Qty: 90 TABLET | Refills: 1 | Status: SHIPPED | OUTPATIENT
Start: 2019-05-15 | End: 2019-11-15 | Stop reason: SDUPTHER

## 2019-05-15 RX ORDER — ATORVASTATIN CALCIUM 20 MG/1
20 TABLET, FILM COATED ORAL DAILY
Qty: 90 TABLET | Refills: 1 | Status: SHIPPED | OUTPATIENT
Start: 2019-05-15 | End: 2020-01-14 | Stop reason: SDUPTHER

## 2019-05-15 NOTE — PROGRESS NOTES
Subjective   Julisa Nixon is a 54 y.o. female who comes in today for   Chief Complaint   Patient presents with   • Shoulder Pain     Righht   • Hypertension   .    History of Present Illness   Here for f/u on HTN and HL and recent sinusitis/bronchitis that started after a fever illness she got from flying first of May.  Finishing zpak today.  Cough is better after zpak.  On may 10th, she took two benadryl, claritin D and cough syrup with D in it and it made her very dizzy and elevated her blood pressure.  Went to  here at  on 5/11/19.  Took an extra lisinopril and that helped her get BP down.  She is here today to ask about what exercises to do to lose weight and what diet to follow?  She had labs in April 2019 that were negative for infection.  LDL was elevated and she restarted her lipitor and is now on it for about a month.    Had enlarged right LN in neck that actually resolved after a few months and therefore her bx that ENT scheduled was canceled.    BP at home today 140/94    The following portions of the patient's history were reviewed and updated as appropriate: allergies, current medications, past family history, past medical history, past social history, past surgical history and problem list.    Review of Systems   Constitutional: Negative.    HENT: Negative.    Respiratory: Negative for cough.        Vitals:    05/15/19 1338   BP: 140/86   Pulse: 100   Temp: 99.2 °F (37.3 °C)   SpO2: 99%       Objective   Physical Exam   Constitutional: She is oriented to person, place, and time. She appears well-developed and well-nourished.   HENT:   Head: Normocephalic and atraumatic.   Right Ear: External ear normal.   Left Ear: External ear normal.   Mouth/Throat: Oropharynx is clear and moist.   Eyes: Conjunctivae are normal.   Neck: Neck supple.   Cardiovascular: Normal rate, regular rhythm and normal heart sounds.   No bruits   Pulmonary/Chest: Effort normal and breath sounds normal. No respiratory  distress. She has no wheezes. She has no rales.   Abdominal: Soft. Bowel sounds are normal. She exhibits no distension and no mass. There is no tenderness.   Lymphadenopathy:     She has no cervical adenopathy.   Neurological: She is alert and oriented to person, place, and time.   Skin: Skin is warm.   Psychiatric: She has a normal mood and affect. Her behavior is normal. Judgment and thought content normal.   Nursing note and vitals reviewed.        Current Outpatient Medications:   •  atorvastatin (LIPITOR) 20 MG tablet, Take 1 tablet by mouth Daily., Disp: 90 tablet, Rfl: 1  •  lisinopril (PRINIVIL,ZESTRIL) 30 MG tablet, Take 1 tablet by mouth Daily., Disp: 90 tablet, Rfl: 1  •  traZODone (DESYREL) 50 MG tablet, TAKE ONE TABLET BY MOUTH ONCE NIGHTLY AS NEEDED FOR SLEEP, Disp: 30 tablet, Rfl: 5    Assessment/Plan   Julisa was seen today for shoulder pain and hypertension.    Diagnoses and all orders for this visit:    Essential hypertension    Hyperlipidemia, unspecified hyperlipidemia type  -     Comprehensive Metabolic Panel; Future  -     Lipid Panel With LDL / HDL Ratio; Future    Insomnia, unspecified type    Hyperglycemia    Other orders  -     lisinopril (PRINIVIL,ZESTRIL) 30 MG tablet; Take 1 tablet by mouth Daily.  -     atorvastatin (LIPITOR) 20 MG tablet; Take 1 tablet by mouth Daily.    increase lisinopril to 30 mg qd from 20 mg.  Recheck CMP in 1 week  Restart lipitor a few months ago--recheck CMP and FLP in a 1 week                 I have asked for the patient to return to clinic in 6month(s).

## 2019-05-24 ENCOUNTER — RESULTS ENCOUNTER (OUTPATIENT)
Dept: FAMILY MEDICINE CLINIC | Facility: CLINIC | Age: 54
End: 2019-05-24

## 2019-05-24 DIAGNOSIS — E78.5 HYPERLIPIDEMIA, UNSPECIFIED HYPERLIPIDEMIA TYPE: ICD-10-CM

## 2019-05-24 LAB
ALBUMIN SERPL-MCNC: 4.3 G/DL (ref 3.5–5.2)
ALBUMIN/GLOB SERPL: 1.5 G/DL
ALP SERPL-CCNC: 82 U/L (ref 39–117)
ALT SERPL-CCNC: 30 U/L (ref 1–33)
AST SERPL-CCNC: 22 U/L (ref 1–32)
BILIRUB SERPL-MCNC: 0.4 MG/DL (ref 0.2–1.2)
BUN SERPL-MCNC: 14 MG/DL (ref 6–20)
BUN/CREAT SERPL: 19.2 (ref 7–25)
CALCIUM SERPL-MCNC: 9.7 MG/DL (ref 8.6–10.5)
CHLORIDE SERPL-SCNC: 100 MMOL/L (ref 98–107)
CHOLEST SERPL-MCNC: 150 MG/DL (ref 0–200)
CO2 SERPL-SCNC: 28.7 MMOL/L (ref 22–29)
CREAT SERPL-MCNC: 0.73 MG/DL (ref 0.57–1)
GLOBULIN SER CALC-MCNC: 2.9 GM/DL
GLUCOSE SERPL-MCNC: 105 MG/DL (ref 65–99)
HDLC SERPL-MCNC: 51 MG/DL (ref 40–60)
LDLC SERPL CALC-MCNC: 79 MG/DL (ref 0–100)
LDLC/HDLC SERPL: 1.55 {RATIO}
POTASSIUM SERPL-SCNC: 4.8 MMOL/L (ref 3.5–5.2)
PROT SERPL-MCNC: 7.2 G/DL (ref 6–8.5)
SODIUM SERPL-SCNC: 141 MMOL/L (ref 136–145)
TRIGL SERPL-MCNC: 101 MG/DL (ref 0–150)
VLDLC SERPL CALC-MCNC: 20.2 MG/DL

## 2019-05-28 ENCOUNTER — OFFICE VISIT (OUTPATIENT)
Dept: FAMILY MEDICINE CLINIC | Facility: CLINIC | Age: 54
End: 2019-05-28

## 2019-05-28 VITALS
SYSTOLIC BLOOD PRESSURE: 134 MMHG | HEART RATE: 102 BPM | WEIGHT: 181.6 LBS | TEMPERATURE: 98.5 F | OXYGEN SATURATION: 95 % | DIASTOLIC BLOOD PRESSURE: 73 MMHG | BODY MASS INDEX: 29.18 KG/M2 | HEIGHT: 66 IN

## 2019-05-28 DIAGNOSIS — M54.2 NECK PAIN ON RIGHT SIDE: Primary | ICD-10-CM

## 2019-05-28 DIAGNOSIS — R73.03 PREDIABETES: ICD-10-CM

## 2019-05-28 PROCEDURE — 99213 OFFICE O/P EST LOW 20 MIN: CPT | Performed by: NURSE PRACTITIONER

## 2019-05-28 NOTE — PATIENT INSTRUCTIONS
Prediabetes Eating Plan  Prediabetes is a condition that causes blood sugar (glucose) levels to be higher than normal. This increases the risk for developing diabetes. In order to prevent diabetes from developing, your health care provider may recommend a diet and other lifestyle changes to help you:  · Control your blood glucose levels.  · Improve your cholesterol levels.  · Manage your blood pressure.    Your health care provider may recommend working with a diet and nutrition specialist (dietitian) to make a meal plan that is best for you.  What are tips for following this plan?  Lifestyle  · Set weight loss goals with the help of your health care team. It is recommended that most people with prediabetes lose 7% of their current body weight.  · Exercise for at least 30 minutes at least 5 days a week.  · Attend a support group or seek ongoing support from a mental health counselor.  · Take over-the-counter and prescription medicines only as told by your health care provider.  Reading food labels  · Read food labels to check the amount of fat, salt (sodium), and sugar in prepackaged foods. Avoid foods that have:  ? Saturated fats.  ? Trans fats.  ? Added sugars.  · Avoid foods that have more than 300 milligrams (mg) of sodium per serving. Limit your daily sodium intake to less than 2,300 mg each day.  Shopping  · Avoid buying pre-made and processed foods.  Cooking  · Cook with olive oil. Do not use butter, lard, or ghee.  · Bake, broil, grill, or boil foods. Avoid frying.  Meal planning  · Work with your dietitian to develop an eating plan that is right for you. This may include:  ? Tracking how many calories you take in. Use a food diary, notebook, or mobile application to track what you eat at each meal.  ? Using the glycemic index (GI) to plan your meals. The index tells you how quickly a food will raise your blood glucose. Choose low-GI foods. These foods take a longer time to raise blood  glucose.  · Consider following a Mediterranean diet. This diet includes:  ? Several servings each day of fresh fruits and vegetables.  ? Eating fish at least twice a week.  ? Several servings each day of whole grains, beans, nuts, and seeds.  ? Using olive oil instead of other fats.  ? Moderate alcohol consumption.  ? Eating small amounts of red meat and whole-fat dairy.  · If you have high blood pressure, you may need to limit your sodium intake or follow a diet such as the DASH eating plan. DASH is an eating plan that aims to lower high blood pressure.  What foods are recommended?  The items listed below may not be a complete list. Talk with your dietitian about what dietary choices are best for you.  Grains  Whole grains, such as whole-wheat or whole-grain breads, crackers, cereals, and pasta. Unsweetened oatmeal. Bulgur. Barley. Quinoa. Brown rice. Corn or whole-wheat flour tortillas or taco shells.  Vegetables  Lettuce. Spinach. Peas. Beets. Cauliflower. Cabbage. Broccoli. Carrots. Tomatoes. Squash. Eggplant. Herbs. Peppers. Onions. Cucumbers. [place] sprouts.  Fruits  Berries. Bananas. Apples. Oranges. Grapes. Papaya. Baggs. Pomegranate. Kiwi. Grapefruit. Cherries.  Meats and other protein foods  Seafood. Poultry without skin. Lean cuts of pork and beef. Tofu. Eggs. Nuts. Beans.  Dairy  Low-fat or fat-free dairy products, such as yogurt, cottage cheese, and cheese.  Beverages  Water. Tea. Coffee. Sugar-free or diet soda. [place] water. Lowfat or no-fat milk. Milk alternatives, such as soy or almond milk.  Fats and oils  Olive oil. Canola oil. Sunflower oil. Grapeseed oil. Avocado. Walnuts.  Sweets and desserts  Sugar-free or low-fat pudding. Sugar-free or low-fat ice cream and other frozen treats.  Seasoning and other foods  Herbs. Sodium-free spices. Mustard. Relish. Low-fat, low-sugar ketchup. Low-fat, low-sugar barbecue sauce. Low-fat or fat-free mayonnaise.  What foods are not recommended?  The items  listed below may not be a complete list. Talk with your dietitian about what dietary choices are best for you.  Grains  Refined white flour and flour products, such as bread, pasta, snack foods, and cereals.  Vegetables  Canned vegetables. Frozen vegetables with butter or cream sauce.  Fruits  Fruits canned with syrup.  Meats and other protein foods  Fatty cuts of meat. Poultry with skin. Breaded or fried meat. Processed meats.  Dairy  Full-fat yogurt, cheese, or milk.  Beverages  Sweetened drinks, such as sweet iced tea and soda.  Fats and oils  Butter. Lard. Ghee.  Sweets and desserts  Baked goods, such as cake, cupcakes, pastries, cookies, and cheesecake.  Seasoning and other foods  Spice mixes with added salt. Ketchup. Barbecue sauce. Mayonnaise.  Summary  · To prevent diabetes from developing, you may need to make diet and other lifestyle changes to help control blood sugar, improve cholesterol levels, and manage your blood pressure.  · Set weight loss goals with the help of your health care team. It is recommended that most people with prediabetes lose 7 percent of their current body weight.  · Consider following a Mediterranean diet that includes plenty of fresh fruits and vegetables, whole grains, beans, nuts, seeds, fish, lean meat, low-fat dairy, and healthy oils.  This information is not intended to replace advice given to you by your health care provider. Make sure you discuss any questions you have with your health care provider.  Document Released: 05/03/2016 Document Revised: 02/21/2018 Document Reviewed: 02/21/2018  [email or web address] Interactive Patient Education © 2019 [email or web address]

## 2019-05-28 NOTE — PROGRESS NOTES
Subjective   Julisa Nixon is a 54 y.o. female.     Chief Complaint   Patient presents with   • Swollen Glands     in the last few months   • Ear Problem     wants her ears look at before her trip    • Back Pain     on and off   • Shoulder Pain     in the right shoulder on and off   • Neck Pain     on and off      HPI  Patient is known to me.  She is here to discuss her recent blood work that she does not understand, to f/u on right shoulder, back pain, and to have her ears checked as she is going on vacation and is worried that her ears might rupture as she had problems previously, also wants to discuss her right LAD.    Right LAD:  She had an MRI and f/u with ENT who recommended bx however there was nothing noted on US to bx so she thinks her LAD is now resolved.  She also had her son in law review results.  She is feeling good about these results at this time.       Neck pain:  On the right side and it has improved with PT and she has not been lifting grandson.  However she has noticed some new right lower back pain after lifting and wonders if they could be connected.     Lab results:  Has questions about prediabetes and interpreting what labs were done and what they mean.    Ear problems:  Uses flonase daily and not currently having any ear pain, but is concerned about flying.     Social History     Tobacco Use   • Smoking status: Never Smoker   • Smokeless tobacco: Never Used   Substance Use Topics   • Alcohol use: Yes     Comment: rare   • Drug use: No       The following portions of the patient's history were reviewed and updated as appropriate: allergies, current medications, past family history, past medical history, past social history, past surgical history and problem list.    Review of Systems   Constitutional: Negative for activity change, appetite change, chills, fatigue and fever.   HENT: Negative for congestion, ear discharge, ear pain, rhinorrhea and sore throat.    Respiratory: Negative for  "cough and shortness of breath.    Cardiovascular: Negative for chest pain and palpitations.   Gastrointestinal: Negative for abdominal pain, diarrhea, nausea and vomiting.   Musculoskeletal: Positive for back pain and neck pain. Negative for gait problem and neck stiffness.   Neurological: Negative for dizziness and headaches.       Objective   Blood pressure 134/73, pulse 102, temperature 98.5 °F (36.9 °C), temperature source Oral, height 167.6 cm (65.98\"), weight 82.4 kg (181 lb 9.6 oz), SpO2 95 %.    Physical Exam   Constitutional: She is oriented to person, place, and time. She appears well-developed and well-nourished. No distress.   HENT:   Head: Normocephalic and atraumatic.   Right Ear: External ear and ear canal normal. Tympanic membrane is not erythematous and not bulging. A middle ear effusion is present.   Left Ear: External ear and ear canal normal. Tympanic membrane is not erythematous and not bulging. A middle ear effusion is present.   Nose: Nose normal. Right sinus exhibits no maxillary sinus tenderness and no frontal sinus tenderness. Left sinus exhibits no maxillary sinus tenderness and no frontal sinus tenderness.   Mouth/Throat: Uvula is midline and oropharynx is clear and moist.   Eyes: Conjunctivae are normal. Right eye exhibits no discharge. Left eye exhibits no discharge.   Neck: Neck supple.   Cardiovascular: Normal rate, regular rhythm and normal heart sounds.   Pulmonary/Chest: Effort normal and breath sounds normal.   Abdominal: Soft. Bowel sounds are normal. There is no tenderness.   Musculoskeletal: She exhibits tenderness. She exhibits no deformity.   Gait smooth and steady  Minor TTP right paraspinal cervical muscles and right trapezius  No obvious deformities   Lymphadenopathy:     She has no cervical adenopathy.   Neurological: She is alert and oriented to person, place, and time.   Skin: Skin is warm and dry. She is not diaphoretic.   Psychiatric: She has a normal mood and affect. "   Nursing note and vitals reviewed.      Assessment   Problem List Items Addressed This Visit        Nervous and Auditory    Neck pain on right side - Primary      Other Visit Diagnoses     Prediabetes               Procedures           Impression and Plan:  Labs reviewed with patient and we discussed prediabetic diet and information given.    Ears show a little effusion b/l, no s/s infection other  complications noted.  Recommend to continue daily antihistamine and increase flonase to BID x 1 week, then daily.  Proper technique demonstrated.     Neck pain:  Demonstrated neck stretches and proper technique which patient return demonstrated.     Right LAD:  Appears to be resolved.  F/U with PCP as directed.         Health Maintenance Due   Topic Date Due   • HEPATITIS C SCREENING  07/15/2016   • ANNUAL PHYSICAL  12/14/2018              EMR Dragon/Transcription disclaimer:   Much of this encounter note is an electronic transcription/translation of spoken language to printed text. The electronic translation of spoken language may permit erroneous, or at times, nonsensical words or phrases to be inadvertently transcribed; Although I have reviewed the note for such errors, some may still exist.

## 2019-06-11 ENCOUNTER — TELEPHONE (OUTPATIENT)
Dept: FAMILY MEDICINE CLINIC | Facility: CLINIC | Age: 54
End: 2019-06-11

## 2019-06-11 DIAGNOSIS — M25.519 SHOULDER PAIN, UNSPECIFIED CHRONICITY, UNSPECIFIED LATERALITY: Primary | ICD-10-CM

## 2019-07-09 ENCOUNTER — OFFICE VISIT (OUTPATIENT)
Dept: FAMILY MEDICINE CLINIC | Facility: CLINIC | Age: 54
End: 2019-07-09

## 2019-07-09 VITALS
BODY MASS INDEX: 29.07 KG/M2 | HEART RATE: 88 BPM | DIASTOLIC BLOOD PRESSURE: 78 MMHG | TEMPERATURE: 98.9 F | OXYGEN SATURATION: 98 % | WEIGHT: 180.9 LBS | SYSTOLIC BLOOD PRESSURE: 122 MMHG | HEIGHT: 66 IN

## 2019-07-09 DIAGNOSIS — M25.511 CHRONIC RIGHT SHOULDER PAIN: Primary | ICD-10-CM

## 2019-07-09 DIAGNOSIS — G89.29 CHRONIC RIGHT SHOULDER PAIN: Primary | ICD-10-CM

## 2019-07-09 PROCEDURE — 99213 OFFICE O/P EST LOW 20 MIN: CPT | Performed by: INTERNAL MEDICINE

## 2019-07-09 NOTE — PROGRESS NOTES
Subjective   Julisa Nixon is a 54 y.o. female who comes in today for   Chief Complaint   Patient presents with   • Shoulder Pain     Right shoulder   .    History of Present Illness   Right shoulder pain persisting.  Saw NP at ortho office and xrays are negative.  She knows it isn't her bones.  Hasn't had MRI of shoulder.  Had MRI of neck and soft tissue of her neck.  Has a palpable right parotid mass but MRI doesn't show it.  Biopsies were not done because on the MRI there was no visible area to biopsy.  She is seeing Dr. Mccauley regularly for this.  She does notice that the area swells off and on and she is wondering if it is related to the pain in her shoulder.  she saw her dentist and she now has a mouth guard due to possible clenching in night of teeth.  Her neck gets stiff and painful if she holds her grandchildren and her right parotid area will swell.  Right shoulder tires easily with cooking or working with grandkids.  Burning pain.    The following portions of the patient's history were reviewed and updated as appropriate: allergies, current medications, past family history, past medical history, past social history, past surgical history and problem list.    Review of Systems   HENT:        F/u Dr. Mccauley in 3 mo with u/s of parotid neck   Musculoskeletal: Positive for arthralgias and neck pain.       Vitals:    07/09/19 1358   BP: 122/78   Pulse: 88   Temp: 98.9 °F (37.2 °C)   SpO2: 98%       Objective   Physical Exam   Constitutional: She is oriented to person, place, and time. She appears well-developed and well-nourished.   HENT:   Head: Normocephalic and atraumatic.   Eyes: Conjunctivae are normal.   Neck: Neck supple.   Pulmonary/Chest: Effort normal and breath sounds normal.   Musculoskeletal: Normal range of motion. She exhibits no edema or deformity.   Right trapezius trigger point for cause significant pain   Neurological: She is alert and oriented to person, place, and time.   Skin: Skin is  warm. Capillary refill takes less than 2 seconds.   Psychiatric: She has a normal mood and affect. Her behavior is normal. Judgment and thought content normal.   Nursing note and vitals reviewed.        Current Outpatient Medications:   •  atorvastatin (LIPITOR) 20 MG tablet, Take 1 tablet by mouth Daily., Disp: 90 tablet, Rfl: 1  •  lisinopril (PRINIVIL,ZESTRIL) 30 MG tablet, Take 1 tablet by mouth Daily., Disp: 90 tablet, Rfl: 1  •  traZODone (DESYREL) 50 MG tablet, TAKE ONE TABLET BY MOUTH ONCE NIGHTLY AS NEEDED FOR SLEEP, Disp: 30 tablet, Rfl: 5    Assessment/Plan   Julisa was seen today for shoulder pain.    Diagnoses and all orders for this visit:    Chronic right shoulder pain  -     MRI Shoulder Right Without Contrast; Future  -     Ambulatory Referral to Physical Therapy Evaluate and treat    get MRI of shoulder  [facility] with [facility] needling for trigger points in trapezius.    F/u Dr. Mccauley for parotid swelling intermittent with neg MRI.   I wonder if the area in the right parotid is actually more of a salivary gland duct stenosis or may be a stone that causes intermittent swelling.  This could be the case that she does notice it more when she travels and may be is related to dehydration.  I did encourage her to drink a lot of water and to suck on sugar-free sour gum drops or lemon juice.  She will follow-up with Dr. Mccauley in 3 months for an ultrasound in his office.  Good to try to get records of her MRI of the neck.  She did have a significant trigger point on the right trapezius and I do think that this is some of her pain in her shoulder and neck so I think PT will be helpful with possibly dry needling             I have asked for the patient to return to clinic in 6month(s).

## 2019-07-16 ENCOUNTER — TELEPHONE (OUTPATIENT)
Dept: FAMILY MEDICINE CLINIC | Facility: CLINIC | Age: 54
End: 2019-07-16

## 2019-07-16 NOTE — TELEPHONE ENCOUNTER
Clementina, can you call [facility] (I think that is where she prefers to go) and find out if we are looking at shoulder joint--no contrast is needed.  Will insurance cover MRI with contrast on shoulder joint?  For shoulder pain?

## 2019-07-16 NOTE — TELEPHONE ENCOUNTER
Pt is calling stating that MRI needs to be with contrast because she wants to make sure nothing is wrong with her soft tissue.. Did you want it with contrast? I see that it is ordered without.

## 2019-07-16 NOTE — TELEPHONE ENCOUNTER
Ins will only cover it with contrast if you are looking for an abscess or a tumor  Please advise and if so change order in epic

## 2019-07-17 DIAGNOSIS — M25.511 CHRONIC RIGHT SHOULDER PAIN: Primary | ICD-10-CM

## 2019-07-17 DIAGNOSIS — G89.29 CHRONIC RIGHT SHOULDER PAIN: Primary | ICD-10-CM

## 2019-07-17 NOTE — TELEPHONE ENCOUNTER
Spoke w/ pt 7/17/19 @ 10:15 pt has decided to keep the mri without contrast ... After I rescheduled and got a new precert

## 2019-07-17 NOTE — TELEPHONE ENCOUNTER
Done--it is called MRI with arthrogram;  It is also used to detect labral tear or rotator cuff tear

## 2019-07-22 DIAGNOSIS — G89.29 CHRONIC RIGHT SHOULDER PAIN: ICD-10-CM

## 2019-07-22 DIAGNOSIS — M25.511 CHRONIC RIGHT SHOULDER PAIN: ICD-10-CM

## 2019-10-14 ENCOUNTER — TELEPHONE (OUTPATIENT)
Dept: FAMILY MEDICINE CLINIC | Facility: CLINIC | Age: 54
End: 2019-10-14

## 2019-10-14 NOTE — TELEPHONE ENCOUNTER
Patient is calling regarding her swollen lymphnode that she has had for about 8 months. She is stating that she just had an ultrasound done by Dr Mccauley (ENT) and he found a tumor that is 7 mm, and she wanted to talk to Dr Stewart about this situation and see what she would like her to do. Patient is very upset and confused on what she is supposed to do next and would just like some answers. Is there any way we could work her in to see Dr Stewart? Her phone number is [phone]. Thank you.

## 2019-10-16 ENCOUNTER — OFFICE VISIT (OUTPATIENT)
Dept: FAMILY MEDICINE CLINIC | Facility: CLINIC | Age: 54
End: 2019-10-16

## 2019-10-16 VITALS
DIASTOLIC BLOOD PRESSURE: 76 MMHG | TEMPERATURE: 98.4 F | WEIGHT: 175.3 LBS | HEIGHT: 66 IN | HEART RATE: 93 BPM | OXYGEN SATURATION: 98 % | SYSTOLIC BLOOD PRESSURE: 124 MMHG | BODY MASS INDEX: 28.17 KG/M2

## 2019-10-16 DIAGNOSIS — E78.2 MIXED HYPERLIPIDEMIA: ICD-10-CM

## 2019-10-16 DIAGNOSIS — I10 ESSENTIAL HYPERTENSION: ICD-10-CM

## 2019-10-16 DIAGNOSIS — R22.1 MASS OF RIGHT SIDE OF NECK: Primary | ICD-10-CM

## 2019-10-16 PROCEDURE — 99213 OFFICE O/P EST LOW 20 MIN: CPT | Performed by: NURSE PRACTITIONER

## 2019-10-16 NOTE — PROGRESS NOTES
Subjective   Julisa Nixon is a 54 y.o. female  Presents for follow up swollen glands. Reports Swollen nodes in February, MRI completed, went to Dr. Mccauley, US completed, bopsy was not completed as they were unable to visualize mass. Recommended to follow up in 3-4 months, appt was cancelled due to scheduling conflict. She recently had a follow up and a tumor/mass was noted in the area of concern, 7 mm. She has a follow up with specialist in [place], but wanted to see if she could get in sooner. She is very anxious as this has been going on for months without resolution.     History of Present Illness     The following portions of the patient's history were reviewed and updated as appropriate: allergies, current medications, past family history, past medical history, past social history, past surgical history and problem list.    Review of Systems    Objective   Physical Exam   Constitutional: She is oriented to person, place, and time. She appears well-developed and well-nourished.   HENT:   Head: Normocephalic and atraumatic.   Right Ear: External ear normal.   Left Ear: External ear normal.   Nose: Nose normal.   Mouth/Throat: Uvula is midline and oropharynx is clear and moist. No oropharyngeal exudate.   Right jaw nontender to palpate   Cardiovascular: Normal rate, regular rhythm and normal heart sounds. Exam reveals no gallop and no friction rub.   No murmur heard.  Pulmonary/Chest: Effort normal and breath sounds normal. No stridor. No respiratory distress. She has no wheezes. She has no rales.   Lymphadenopathy:     She has cervical adenopathy.   Neurological: She is alert and oriented to person, place, and time.   Skin: Skin is warm and dry.   Psychiatric: Her mood appears anxious.   Vitals reviewed.      Assessment/Plan   Julisa was seen today for swollen glands.    Diagnoses and all orders for this visit:    Mass of right side of neck  -     CBC & Differential  -     Sedimentation Rate  -      C-reactive Protein  -     Lactate Dehydrogenase  -     Comprehensive metabolic panel    Mixed hyperlipidemia  -     CBC & Differential  -     Lipid Panel With LDL / HDL Ratio    Essential hypertension  -     CBC & Differential  -     Comprehensive metabolic panel      Labs recommended and will complete today  Discussed findings with PCP, recommend follow up in 3-4 months   Will refer to general surgery, however PCP recommends keeping current appt in [place]  Scheduled for a colonoscopy, instructed to keep appointment

## 2019-10-17 LAB
ALBUMIN SERPL-MCNC: 4.9 G/DL (ref 3.5–5.2)
ALBUMIN/GLOB SERPL: 1.7 G/DL
ALP SERPL-CCNC: 79 U/L (ref 39–117)
ALT SERPL-CCNC: 30 U/L (ref 1–33)
AST SERPL-CCNC: 23 U/L (ref 1–32)
BASOPHILS # BLD AUTO: 0.03 10*3/MM3 (ref 0–0.2)
BASOPHILS NFR BLD AUTO: 0.6 % (ref 0–1.5)
BILIRUB SERPL-MCNC: 0.4 MG/DL (ref 0.2–1.2)
BUN SERPL-MCNC: 9 MG/DL (ref 6–20)
BUN/CREAT SERPL: 12.2 (ref 7–25)
CALCIUM SERPL-MCNC: 9.6 MG/DL (ref 8.6–10.5)
CHLORIDE SERPL-SCNC: 101 MMOL/L (ref 98–107)
CHOLEST SERPL-MCNC: 114 MG/DL (ref 0–200)
CO2 SERPL-SCNC: 28.7 MMOL/L (ref 22–29)
CREAT SERPL-MCNC: 0.74 MG/DL (ref 0.57–1)
CRP SERPL-MCNC: 0.66 MG/DL (ref 0–0.5)
EOSINOPHIL # BLD AUTO: 0.03 10*3/MM3 (ref 0–0.4)
EOSINOPHIL NFR BLD AUTO: 0.6 % (ref 0.3–6.2)
ERYTHROCYTE [DISTWIDTH] IN BLOOD BY AUTOMATED COUNT: 13.7 % (ref 12.3–15.4)
ERYTHROCYTE [SEDIMENTATION RATE] IN BLOOD BY WESTERGREN METHOD: 14 MM/HR (ref 0–30)
GLOBULIN SER CALC-MCNC: 2.9 GM/DL
GLUCOSE SERPL-MCNC: 102 MG/DL (ref 65–99)
HCT VFR BLD AUTO: 44.3 % (ref 34–46.6)
HDLC SERPL-MCNC: 47 MG/DL (ref 40–60)
HGB BLD-MCNC: 14.7 G/DL (ref 12–15.9)
IMM GRANULOCYTES # BLD AUTO: 0 10*3/MM3 (ref 0–0.05)
IMM GRANULOCYTES NFR BLD AUTO: 0 % (ref 0–0.5)
LDH SERPL-CCNC: 152 U/L (ref 135–214)
LDLC SERPL CALC-MCNC: 54 MG/DL (ref 0–100)
LDLC/HDLC SERPL: 1.15 {RATIO}
LYMPHOCYTES # BLD AUTO: 1.64 10*3/MM3 (ref 0.7–3.1)
LYMPHOCYTES NFR BLD AUTO: 32.8 % (ref 19.6–45.3)
MCH RBC QN AUTO: 29 PG (ref 26.6–33)
MCHC RBC AUTO-ENTMCNC: 33.2 G/DL (ref 31.5–35.7)
MCV RBC AUTO: 87.4 FL (ref 79–97)
MONOCYTES # BLD AUTO: 0.34 10*3/MM3 (ref 0.1–0.9)
MONOCYTES NFR BLD AUTO: 6.8 % (ref 5–12)
NEUTROPHILS # BLD AUTO: 2.96 10*3/MM3 (ref 1.7–7)
NEUTROPHILS NFR BLD AUTO: 59.2 % (ref 42.7–76)
NRBC BLD AUTO-RTO: 0 /100 WBC (ref 0–0.2)
PLATELET # BLD AUTO: 313 10*3/MM3 (ref 140–450)
POTASSIUM SERPL-SCNC: 4.1 MMOL/L (ref 3.5–5.2)
PROT SERPL-MCNC: 7.8 G/DL (ref 6–8.5)
RBC # BLD AUTO: 5.07 10*6/MM3 (ref 3.77–5.28)
SODIUM SERPL-SCNC: 142 MMOL/L (ref 136–145)
TRIGL SERPL-MCNC: 65 MG/DL (ref 0–150)
VLDLC SERPL CALC-MCNC: 13 MG/DL
WBC # BLD AUTO: 5 10*3/MM3 (ref 3.4–10.8)

## 2019-10-17 NOTE — TELEPHONE ENCOUNTER
I called pt at 8 am and spoke with her.  She actually saw Julianna today and had lab work.  She is getting a second opinion at [facility] and has an appt 11/12/19

## 2019-11-15 RX ORDER — LISINOPRIL 30 MG/1
TABLET ORAL
Qty: 90 TABLET | Refills: 0 | Status: SHIPPED | OUTPATIENT
Start: 2019-11-15 | End: 2020-01-14 | Stop reason: SDUPTHER

## 2019-12-23 RX ORDER — TRAZODONE HYDROCHLORIDE 50 MG/1
TABLET ORAL
Qty: 30 TABLET | Refills: 4 | OUTPATIENT
Start: 2019-12-23 | End: 2020-01-03

## 2020-01-14 ENCOUNTER — OFFICE VISIT (OUTPATIENT)
Dept: FAMILY MEDICINE CLINIC | Facility: CLINIC | Age: 55
End: 2020-01-14

## 2020-01-14 ENCOUNTER — HOSPITAL ENCOUNTER (OUTPATIENT)
Dept: GENERAL RADIOLOGY | Facility: HOSPITAL | Age: 55
Discharge: HOME | End: 2020-01-14
Admitting: INTERNAL MEDICINE

## 2020-01-14 VITALS
WEIGHT: 185.5 LBS | BODY MASS INDEX: 29.81 KG/M2 | HEART RATE: 108 BPM | DIASTOLIC BLOOD PRESSURE: 72 MMHG | SYSTOLIC BLOOD PRESSURE: 122 MMHG | TEMPERATURE: 98.4 F | OXYGEN SATURATION: 95 % | HEIGHT: 66 IN | RESPIRATION RATE: 16 BRPM

## 2020-01-14 DIAGNOSIS — G89.29 CHRONIC RIGHT SACROILIAC PAIN: ICD-10-CM

## 2020-01-14 DIAGNOSIS — G47.00 INSOMNIA, UNSPECIFIED TYPE: ICD-10-CM

## 2020-01-14 DIAGNOSIS — Z23 ENCOUNTER FOR IMMUNIZATION: Primary | ICD-10-CM

## 2020-01-14 DIAGNOSIS — R73.9 HYPERGLYCEMIA: ICD-10-CM

## 2020-01-14 DIAGNOSIS — I10 ESSENTIAL HYPERTENSION: ICD-10-CM

## 2020-01-14 DIAGNOSIS — M25.551 RIGHT HIP PAIN: ICD-10-CM

## 2020-01-14 DIAGNOSIS — E78.5 HYPERLIPIDEMIA, UNSPECIFIED HYPERLIPIDEMIA TYPE: ICD-10-CM

## 2020-01-14 DIAGNOSIS — M53.3 CHRONIC RIGHT SACROILIAC PAIN: ICD-10-CM

## 2020-01-14 PROCEDURE — 99214 OFFICE O/P EST MOD 30 MIN: CPT | Performed by: INTERNAL MEDICINE

## 2020-01-14 PROCEDURE — 73502 X-RAY EXAM HIP UNI 2-3 VIEWS: CPT

## 2020-01-14 PROCEDURE — 90471 IMMUNIZATION ADMIN: CPT | Performed by: INTERNAL MEDICINE

## 2020-01-14 PROCEDURE — 90674 CCIIV4 VAC NO PRSV 0.5 ML IM: CPT | Performed by: INTERNAL MEDICINE

## 2020-01-14 RX ORDER — ATORVASTATIN CALCIUM 20 MG/1
20 TABLET, FILM COATED ORAL DAILY
Qty: 90 TABLET | Refills: 1 | Status: SHIPPED | OUTPATIENT
Start: 2020-01-14 | End: 2020-08-10

## 2020-01-14 RX ORDER — TRAZODONE HYDROCHLORIDE 50 MG/1
50 TABLET ORAL NIGHTLY PRN
Qty: 30 TABLET | Refills: 0 | Status: SHIPPED | OUTPATIENT
Start: 2020-01-14 | End: 2020-10-13 | Stop reason: SDUPTHER

## 2020-01-14 RX ORDER — LISINOPRIL 30 MG/1
30 TABLET ORAL DAILY
Qty: 90 TABLET | Refills: 1 | Status: SHIPPED | OUTPATIENT
Start: 2020-01-14 | End: 2020-08-28

## 2020-01-14 NOTE — PROGRESS NOTES
"Subjective   Julisa Nixon is a 54 y.o. female who comes in today for   Chief Complaint   Patient presents with   • Hyperlipidemia     refill atorvastatin    .    History of Present Illness   Saw Dr. Tan at  for the right neck pain and possible mass.  Saw Dr. Mccauley who thought was a mass and went for FNA but then at the guided bx, they couldn't find the mass.  Dr. Tan ordered CT neck and was negative.  He thought was related to whip lash from [place] and muscle tightness that PT has actually helped.  Neck is feeling much better after PT.  She is here for f/u on HTN on lisinopril and HL on lipitor 20 mg qd.  Recently had a cold that she is getting over.  Had labs with Julianna in 10/2019 and we reviewed them today.  CRP mild elevated and she was not feeling well that day due to inflammation in her body and neck at that time.  Isn't interested in rechecking crp today.    The following portions of the patient's history were reviewed and updated as appropriate: allergies, current medications, past family history, past medical history, past social history, past surgical history and problem list.    Review of Systems   HENT: Positive for voice change (went to  downstairs and got antibiotics).    Respiratory: Negative for cough.    Cardiovascular: Negative.    Musculoskeletal: Negative for neck pain.       /72   Pulse 108   Temp 98.4 °F (36.9 °C) (Oral)   Resp 16   Ht 167.6 cm (65.98\")   Wt 84.1 kg (185 lb 8 oz)   SpO2 95%   BMI 29.96 kg/m²     STEADI Fall Risk Assessment has not been completed.    PHQ-2/PHQ-9 Depression Screening 4/1/2019   Little interest or pleasure in doing things 0   Feeling down, depressed, or hopeless 0   Total Score 0       Objective   Physical Exam   Constitutional: She is oriented to person, place, and time. She appears well-developed and well-nourished.   HENT:   Head: Normocephalic and atraumatic.   Right Ear: External ear normal.   Left Ear: External ear normal. "   Mouth/Throat: Oropharynx is clear and moist.   Eyes: Conjunctivae are normal.   Neck: Neck supple.   Cardiovascular: Normal rate, regular rhythm and normal heart sounds.   No bruits   Pulmonary/Chest: Effort normal and breath sounds normal. No respiratory distress. She has no wheezes. She has no rales.   Abdominal: Soft. Bowel sounds are normal. She exhibits no distension and no mass. There is no tenderness.   Musculoskeletal:   She does have some tenderness in the right leg with external rotation of the hip as well as some tenderness when she extends her leg..     Lymphadenopathy:     She has no cervical adenopathy.   Neurological: She is alert and oriented to person, place, and time.   Skin: Skin is warm.   Psychiatric: She has a normal mood and affect. Her behavior is normal. Judgment and thought content normal.   Nursing note and vitals reviewed.        Current Outpatient Medications:   •  aspirin 81 MG oral suspension, , Disp: , Rfl:   •  atorvastatin (LIPITOR) 20 MG tablet, Take 1 tablet by mouth Daily., Disp: 90 tablet, Rfl: 1  •  benzonatate (TESSALON) 200 MG capsule, Take 1 capsule by mouth 3 (Three) Times a Day As Needed for Cough., Disp: 20 capsule, Rfl: 0  •  lisinopril (PRINIVIL,ZESTRIL) 30 MG tablet, Take 1 tablet by mouth Daily., Disp: 90 tablet, Rfl: 1  •  traZODone (DESYREL) 50 MG tablet, Take 1 tablet by mouth At Night As Needed for Sleep., Disp: 30 tablet, Rfl: 0    Assessment/Plan   Julisa was seen today for hyperlipidemia.    Diagnoses and all orders for this visit:    Encounter for immunization  -     Flucelvax Quad=>4Years (PFS)    Right hip pain  -     XR Hip With or Without Pelvis 2 - 3 View Right; Future    Chronic right sacroiliac pain  -     XR Hip With or Without Pelvis 2 - 3 View Right; Future    Insomnia, unspecified type    Essential hypertension    Hyperlipidemia, unspecified hyperlipidemia type    Hyperglycemia    Other orders  -     atorvastatin (LIPITOR) 20 MG tablet; Take 1  tablet by mouth Daily.  -     lisinopril (PRINIVIL,ZESTRIL) 30 MG tablet; Take 1 tablet by mouth Daily.  -     traZODone (DESYREL) 50 MG tablet; Take 1 tablet by mouth At Night As Needed for Sleep.      Flu shot today  Patient is resolving from upper respiratory infection.  On exam she looks normal.  We talked about good hydration and may be some Mucinex over-the-counter.  Her voice should start to return in the next week or 2.  On my exam, her voice sounds totally normal.  She does have some right hip and SI joint pain that started after her trip to [place] about 11 months ago.  I am to go ahead and get an x-ray of her hip and pelvis.  I provided some exercises and stretches that I think might help her and I have encouraged her to get back into the routine of aerobic exercise with walking or cycling.  Weight loss would be beneficial for her as well.  She is going to continue her Lipitor for cholesterol and her lisinopril for hypertension.  I did refill her trazodone that she takes on as-needed basis for insomnia.  Labs reviewed from October.  Her CRP was mildly elevated but this appears to be due to an inflammatory process that was going on at that time.  She is not interested in rechecking it at this time.             I have asked for the patient to return to clinic in 6month(s).

## 2020-01-14 NOTE — PATIENT INSTRUCTIONS
Sacroiliac Joint Dysfunction    Sacroiliac joint dysfunction is a condition that causes inflammation on one or both sides of the sacroiliac (SI) joint. The SI joint connects the lower part of the spine (sacrum) with the two upper portions of the pelvis (ilium). This condition causes deep aching or burning pain in the low back. In some cases, the pain may also spread into one or both buttocks, hips, or thighs.  What are the causes?  This condition may be caused by:  · Pregnancy. During pregnancy, extra stress is put on the SI joints because the pelvis widens.  · Injury, such as:  ? Injuries from car accidents.  ? Sports-related injuries.  ? Work-related injuries.  · Having one leg that is shorter than the other.  · Conditions that affect the joints, such as:  ? Rheumatoid arthritis.  ? Gout.  ? Psoriatic arthritis.  ? Joint infection (septic arthritis).  Sometimes, the cause of SI joint dysfunction is not known.  What are the signs or symptoms?  Symptoms of this condition include:  · Aching or burning pain in the lower back. The pain may also spread to other areas, such as:  ? Buttocks.  ? Groin.  ? Thighs.  · Muscle spasms in or around the painful areas.  · Increased pain when standing, walking, running, stair climbing, bending, or lifting.  How is this diagnosed?  This condition is diagnosed with a physical exam and medical history. During the exam, the health care provider may move one or both of your legs to different positions to check for pain. Various tests may be done to confirm the diagnosis, including:  · Imaging tests to look for other causes of pain. These may include:  ? MRI.  ? CT scan.  ? Bone scan.  · Diagnostic injection. A numbing medicine is injected into the SI joint using a needle. If your pain is temporarily improved or stopped after the injection, this can indicate that SI joint dysfunction is the problem.  How is this treated?  Treatment depends on the cause and severity of your condition.  Treatment options may include:  · Ice or heat applied to the lower back area after an injury. This may help reduce pain and muscle spasms.  · Medicines to relieve pain or inflammation or to relax the muscles.  · Wearing a back brace (sacroiliac brace) to help support the joint while your back is healing.  · Physical therapy to increase muscle strength around the joint and flexibility at the joint. This may also involve learning proper body positions and ways of moving to relieve stress on the joint.  · Direct manipulation of the SI joint.  · Injections of steroid medicine into the joint to reduce pain and swelling.  · Radiofrequency ablation to burn away nerves that are carrying pain messages from the joint.  · Use of a device that provides electrical stimulation to help reduce pain at the joint.  · Surgery to put in screws and plates that limit or prevent joint motion. This is rare.  Follow these instructions at home:  Medicines  · Take over-the-counter and prescription medicines only as told by your health care provider.  · Do not drive or use heavy machinery while taking prescription pain medicine.  · If you are taking prescription pain medicine, take actions to prevent or treat constipation. Your health care provider may recommend that you:  ? Drink enough fluid to keep your urine pale yellow.  ? Eat foods that are high in fiber, such as fresh fruits and vegetables, whole grains, and beans.  ? Limit foods that are high in fat and processed sugars, such as fried or sweet foods.  ? Take an over-the-counter or prescription medicine for constipation.  If you have a brace:  · Wear the brace as told by your health care provider. Remove it only as told by your health care provider.  · Keep the brace clean.  · If the brace is not waterproof:  ? Do not let it get wet.  ? Cover it with a watertight covering when you take a bath or a shower.  Managing pain, stiffness, and swelling         · Icing can help with pain and  swelling. Heat may help with muscle tension or spasms. Ask your health care provider if you should use ice or heat.  · If directed, put ice on the affected area:  ? If you have a removable brace, remove it as told by your health care provider.  ? Put ice in a plastic bag.  ? Place a towel between your skin and the bag.  ? Leave the ice on for 20 minutes, 2-3 times a day.  · If directed, apply heat to the affected area. Use the heat source that your health care provider recommends, such as a moist heat pack or a heating pad.  ? Place a towel between your skin and the heat source.  ? Leave the heat on for 20-30 minutes.  ? Remove the heat if your skin turns bright red. This is especially important if you are unable to feel pain, heat, or cold. You may have a greater risk of getting burned.  General instructions  · Rest as needed. Ask your health care provider what activities are safe for you.  · Return to your normal activities as told by your health care provider.  · Exercise as directed by your health care provider or physical therapist.  · Do not use any products that contain nicotine or tobacco, such as cigarettes and e-cigarettes. These can delay bone healing. If you need help quitting, ask your health care provider.  · Keep all follow-up visits as told by your health care provider. This is important.  Contact a health care provider if:  · Your pain is not controlled with medicine.  · You have a fever.  · Your pain is getting worse.  Get help right away if:  · You have weakness, numbness, or tingling in your legs or feet.  · You lose control of your bladder or bowel.  Summary  · Sacroiliac joint dysfunction is a condition that causes inflammation on one or both sides of the sacroiliac (SI) joint.  · This condition causes deep aching or burning pain in the low back. In some cases, the pain may also spread into one or both buttocks, hips, or thighs.  · Treatment depends on the cause and severity of your condition.  It may include medicines to reduce pain and swelling or to relax muscles.  This information is not intended to replace advice given to you by your health care provider. Make sure you discuss any questions you have with your health care provider.  Document Released: 03/16/2010 Document Revised: 08/14/2019 Document Reviewed: 01/28/2019  [employer] Interactive Patient Education © 2019 [employer]    Gluteus Medius Syndrome Rehab  Ask your health care provider which exercises are safe for you. Do exercises exactly as told by your health care provider and adjust them as directed. It is normal to feel mild stretching, pulling, tightness, or discomfort as you do these exercises, but you should stop right away if you feel sudden pain or your pain gets worse. Do not begin these exercises until told by your health care provider.  Stretching and range of motion exercise  This exercise warms up your muscles and joints and improves the movement and flexibility of your hip and pelvis. This exercise also helps to relieve pain and stiffness.  Exercise A: Lunge (hip flexor stretch)    1. Kneel on the floor on your left / right knee. Bend your other knee so it is directly over your ankle.  2. Keep good posture with your head over your shoulders. Tuck your tailbone underneath you. This will prevent your back from arching too much.  3. You should feel a gentle stretch in the front of your thigh or hip. If you do not feel a stretch, slowly lunge forward with your chest up.  4. Hold this position for __________ seconds.  5. Slowly return to the starting position.  Repeat __________ times. Complete this exercise __________ times a day.  Strengthening exercises  These exercises build strength and endurance in your hip and pelvis. Endurance is the ability to use your muscles for a long time, even after they get tired.  Exercise B: Bridge (hip extensors)    1. Lie on your back on a firm surface with your knees bent and your feet flat on the  floor.  2. Tighten your buttocks muscles and lift your bottom off the floor until the trunk of your body is level with your thighs.  ? You should feel the muscles working in your buttocks and the back of your thighs. If this exercise is too easy, cross your arms over your chest or lift one leg while your bottom is up off the floor.  ? Do not arch your back.  3. Hold this position for __________ seconds.  4. Slowly lower your hips to the starting position.  5. Let your muscles relax completely between repetitions.  Repeat __________ times. Complete this exercise __________ times a day.  Exercise C: Straight leg raises (hip abductors)    1. Lie on your side with your left / right leg in the top position. Lie so your head, shoulder, knee, and hip line up. Bend your bottom knee to help you balance.  2. Lift your top leg up 4-6 inches (10-15 cm), keeping your toes pointed straight ahead.  3. Hold this position for __________ seconds.  4. Slowly lower your leg to the starting position and let your muscles relax completely.  Repeat __________ times. Complete this exercise __________ times a day.  Exercise D: Hip abductors and external rotators, quadruped  1. Get on your hands and knees on a firm, lightly padded surface. Your hands should be directly below your shoulders, and your knees should be directly below your hips.  2. Lift your left / right knee out to the side. Keep your knee bent. Do not twist your body.  3. Hold this position for __________ seconds.  4. Slowly lower your leg.  Repeat __________ times. Complete this exercise __________ times a day.  Exercise E: Single leg stand  1. Stand near a counter or door frame to hold onto as needed. It is helpful to look in a mirror for this exercise so you can watch your hip.  2. Squeeze your left / right buttock muscles then lift up your other foot. Do not let your left / right hip push out to the side.  3. Hold this position for __________ seconds.  Repeat __________  times. Complete this exercise __________ times a day.  This information is not intended to replace advice given to you by your health care provider. Make sure you discuss any questions you have with your health care provider.  Document Released: 12/18/2006 Document Revised: 08/24/2017 Document Reviewed: 11/29/2016  [employer] Interactive Patient Education © 2019 [employer]    Piriformis Syndrome Rehab  Ask your health care provider which exercises are safe for you. Do exercises exactly as told by your health care provider and adjust them as directed. It is normal to feel mild stretching, pulling, tightness, or discomfort as you do these exercises, but you should stop right away if you feel sudden pain or your pain gets worse. Do not begin these exercises until told by your health care provider.  Stretching and range of motion exercises  These exercises warm up your muscles and joints and improve the movement and flexibility of your hip and pelvis. These exercises also help to relieve pain, numbness, and tingling.  Exercise A: Hip rotators    1. Lie on your back on a firm surface.  2. Pull your left / right knee toward your same shoulder with your left / right hand until your knee is pointing toward the ceiling. Hold your left / right ankle with your other hand.  3. Keeping your knee steady, gently pull your left / right ankle toward your other shoulder until you feel a stretch in your buttocks.  4. Hold this position for __________ seconds.  Repeat __________ times. Complete this stretch __________ times a day.  Exercise B: Hip extensors  1. Lie on your back on a firm surface. Both of your legs should be straight.  2. Pull your left / right knee to your chest. Hold your leg in this position by holding onto the back of your thigh or the front of your knee.  3. Hold this position for __________ seconds.  4. Slowly return to the starting position.  Repeat __________ times. Complete this stretch __________ times a  day.  Strengthening exercises  These exercises build strength and endurance in your hip and thigh muscles. Endurance is the ability to use your muscles for a long time, even after they get tired.  Exercise C: Straight leg raises (hip abductors)    1. Lie on your side with your left / right leg in the top position. Lie so your head, shoulder, knee, and hip line up. Bend your bottom knee to help you balance.  2. Lift your top leg up 4-6 inches (10-15 cm), keeping your toes pointed straight ahead.  3. Hold this position for __________ seconds.  4. Slowly lower your leg to the starting position. Let your muscles relax completely.  Repeat __________ times. Complete this exercise__________ times a day.  Exercise D: Hip abductors and rotators, quadruped    1. Get on your hands and knees on a firm, lightly padded surface. Your hands should be directly below your shoulders, and your knees should be directly below your hips.  2. Lift your left / right knee out to the side. Keep your knee bent. Do not twist your body.  3. Hold this position for __________ seconds.  4. Slowly lower your leg.  Repeat __________ times. Complete this exercise__________ times a day.  Exercise E: Straight leg raises (hip extensors)  1. Lie on your abdomen on a bed or a firm surface with a pillow under your hips.  2. Squeeze your buttock muscles and lift your left / right thigh off the bed. Do not let your back arch.  3. Hold this position for __________ seconds.  4. Slowly return to the starting position. Let your muscles relax completely before doing another repetition.  Repeat __________ times. Complete this exercise__________ times a day.  This information is not intended to replace advice given to you by your health care provider. Make sure you discuss any questions you have with your health care provider.  Document Released: 12/18/2006 Document Revised: 08/22/2017 Document Reviewed: 11/29/2016  [employer] Interactive Patient Education © 2019  [employer]

## 2020-01-16 DIAGNOSIS — M25.551 RIGHT HIP PAIN: Primary | ICD-10-CM

## 2020-01-23 ENCOUNTER — TRANSCRIBE ORDERS (OUTPATIENT)
Dept: FAMILY MEDICINE CLINIC | Facility: CLINIC | Age: 55
End: 2020-01-23

## 2020-01-23 DIAGNOSIS — Z12.31 VISIT FOR SCREENING MAMMOGRAM: Primary | ICD-10-CM

## 2020-01-29 ENCOUNTER — OFFICE VISIT (OUTPATIENT)
Dept: FAMILY MEDICINE CLINIC | Facility: CLINIC | Age: 55
End: 2020-01-29

## 2020-01-29 VITALS
HEIGHT: 66 IN | WEIGHT: 182.4 LBS | OXYGEN SATURATION: 98 % | DIASTOLIC BLOOD PRESSURE: 78 MMHG | RESPIRATION RATE: 16 BRPM | TEMPERATURE: 98.2 F | HEART RATE: 94 BPM | SYSTOLIC BLOOD PRESSURE: 134 MMHG | BODY MASS INDEX: 29.32 KG/M2

## 2020-01-29 DIAGNOSIS — R00.2 PALPITATIONS: ICD-10-CM

## 2020-01-29 DIAGNOSIS — M25.50 ARTHRALGIA, UNSPECIFIED JOINT: ICD-10-CM

## 2020-01-29 DIAGNOSIS — R79.82 ELEVATED C-REACTIVE PROTEIN (CRP): ICD-10-CM

## 2020-01-29 DIAGNOSIS — M79.10 MYALGIA: Primary | ICD-10-CM

## 2020-01-29 PROCEDURE — 93000 ELECTROCARDIOGRAM COMPLETE: CPT | Performed by: INTERNAL MEDICINE

## 2020-01-29 PROCEDURE — 99214 OFFICE O/P EST MOD 30 MIN: CPT | Performed by: INTERNAL MEDICINE

## 2020-01-29 RX ORDER — DULOXETIN HYDROCHLORIDE 30 MG/1
30 CAPSULE, DELAYED RELEASE ORAL DAILY
Qty: 30 CAPSULE | Refills: 6 | Status: SHIPPED | OUTPATIENT
Start: 2020-01-29 | End: 2020-10-13

## 2020-01-30 LAB
ANA SER QL: NEGATIVE
CRP SERPL-MCNC: 0.23 MG/DL (ref 0–0.5)
ERYTHROCYTE [SEDIMENTATION RATE] IN BLOOD BY WESTERGREN METHOD: 1 MM/HR (ref 0–30)
RHEUMATOID FACT SERPL-ACNC: 13.3 IU/ML (ref 0–13.9)
TSH SERPL DL<=0.005 MIU/L-ACNC: 1.66 UIU/ML (ref 0.27–4.2)
URATE SERPL-MCNC: 5 MG/DL (ref 2.4–5.7)

## 2020-02-05 ENCOUNTER — OFFICE VISIT (OUTPATIENT)
Dept: OBSTETRICS AND GYNECOLOGY | Facility: CLINIC | Age: 55
End: 2020-02-05

## 2020-02-05 VITALS
BODY MASS INDEX: 30.66 KG/M2 | WEIGHT: 184 LBS | DIASTOLIC BLOOD PRESSURE: 74 MMHG | HEIGHT: 65 IN | SYSTOLIC BLOOD PRESSURE: 124 MMHG

## 2020-02-05 DIAGNOSIS — Z01.419 ROUTINE GYNECOLOGICAL EXAMINATION: Primary | ICD-10-CM

## 2020-02-05 DIAGNOSIS — Z13.9 SCREENING FOR CONDITION: ICD-10-CM

## 2020-02-05 PROCEDURE — 81002 URINALYSIS NONAUTO W/O SCOPE: CPT | Performed by: OBSTETRICS & GYNECOLOGY

## 2020-02-05 PROCEDURE — 99396 PREV VISIT EST AGE 40-64: CPT | Performed by: OBSTETRICS & GYNECOLOGY
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
## 2020-02-11 ENCOUNTER — TELEPHONE (OUTPATIENT)
Dept: FAMILY MEDICINE CLINIC | Facility: CLINIC | Age: 55
End: 2020-02-11

## 2020-02-11 ENCOUNTER — OFFICE VISIT (OUTPATIENT)
Dept: ORTHOPEDIC SURGERY | Facility: CLINIC | Age: 55
End: 2020-02-11

## 2020-02-11 VITALS — BODY MASS INDEX: 29.09 KG/M2 | WEIGHT: 181 LBS | HEIGHT: 66 IN | TEMPERATURE: 98.3 F

## 2020-02-11 DIAGNOSIS — M25.551 PAIN OF RIGHT HIP JOINT: Primary | ICD-10-CM

## 2020-02-11 PROCEDURE — 99203 OFFICE O/P NEW LOW 30 MIN: CPT | Performed by: ORTHOPAEDIC SURGERY
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
## 2020-02-11 NOTE — TELEPHONE ENCOUNTER
Pt seen Dr. Patel today. He said he doesn't think her pain has anything to do with her joints. He recommends she see a rheumatologist because he thinks it is more muscle.

## 2020-02-11 NOTE — PROGRESS NOTES
"Patient: Julisa Nixon  YOB: 1965 54 y.o. female  Medical Record Number: [id number]    Chief Complaints:   Chief Complaint   Patient presents with   • Right Hip - Establish Care, Pain       History of Present Illness:Julisa Nixon is a 54 y.o. female who presents with complaints of intermittent right hip pain.  She points to the entire hip region and states that it comes and goes.  She feels it may be more correlated with intermittent shoulder and neck pain that also occurs.  She states it started about a year ago and may have started after riding test track at [employer].  She states she was fine and active prior to that.    Allergies: No Known Allergies    Medications:   Current Outpatient Medications   Medication Sig Dispense Refill   • aspirin 81 MG oral suspension      • atorvastatin (LIPITOR) 20 MG tablet Take 1 tablet by mouth Daily. 90 tablet 1   • lisinopril (PRINIVIL,ZESTRIL) 30 MG tablet Take 1 tablet by mouth Daily. 90 tablet 1   • traZODone (DESYREL) 50 MG tablet Take 1 tablet by mouth At Night As Needed for Sleep. 30 tablet 0   • DULoxetine (CYMBALTA) 30 MG capsule Take 1 capsule by mouth Daily. 30 capsule 6     No current facility-administered medications for this visit.          The following portions of the patient's history were reviewed and updated as appropriate: allergies, current medications, past family history, past medical history, past social history, past surgical history and problem list.    Review of Systems:   A 14 point review of systems was performed. All systems negative except pertinent positives/negative listed in HPI above    Physical Exam:   Vitals:    02/11/20 0934   Temp: 98.3 °F (36.8 °C)   Weight: 82.1 kg (181 lb)   Height: 167.6 cm (66\")   PainSc:   7   PainLoc: Hip       General: A and O x 3, ASA, NAD    SCLERA:    Normal    DENTITION:   Normal     Hip:  right    LEG ALIGNMENT:     Neutral   ,    equal leg lengths    GAIT:     Nonantalgic    SKIN:    "  No abnormality    RANGE OF MOTION:      Full without joint irritability    STRENGTH:     5 / 5    hip flexion and abduction    DISTAL PULSES:    Paplable    DISTAL SENSATION :   Intact    LYMPHATICS:     No   lymphadenopathy    OTHER:          - Negative Stinchfeld test      - Negative log roll      - No Tenderness to palpation trochanteric bursa      - Neg FADIR      - Neg RANDY      - No SI tenderness     Radiology:  Xrays 2views (AP bilateral hips, and lateral of the hip) taken at an [facility] institution were reviewed  demonstrating  no abnormality    Assessment/Plan: Diffuse intermittent nonphysiologic right hip region pain that I have trouble attributing to any orthopedic issue.  I think it would be much more likely to have a rheumatologic or neurologic source.  She will discuss this further with her primary care physician as I feel from an orthopedic standpoint there is not much I can offer her.      oRdger Patel MD  2/11/2020

## 2020-02-12 NOTE — TELEPHONE ENCOUNTER
Pt is going to talk to her  about what she should do. Has not taken Cymbalta yet as she has picked up at the pharmacy yesterday.

## 2020-02-17 DIAGNOSIS — M25.50 ARTHRALGIA, UNSPECIFIED JOINT: Primary | ICD-10-CM

## 2020-02-17 DIAGNOSIS — M79.10 MYALGIA: ICD-10-CM

## 2020-02-19 ENCOUNTER — TELEPHONE (OUTPATIENT)
Dept: FAMILY MEDICINE CLINIC | Facility: CLINIC | Age: 55
End: 2020-02-19

## 2020-02-21 RX ORDER — METHYLPREDNISOLONE 4 MG/1
TABLET ORAL
Qty: 21 TABLET | Refills: 0 | Status: SHIPPED | OUTPATIENT
Start: 2020-02-21 | End: 2020-10-13

## 2020-03-04 ENCOUNTER — HOSPITAL ENCOUNTER (OUTPATIENT)
Dept: MAMMOGRAPHY | Facility: HOSPITAL | Age: 55
Discharge: HOME | End: 2020-03-04
Admitting: OBSTETRICS & GYNECOLOGY

## 2020-03-04 DIAGNOSIS — Z12.31 VISIT FOR SCREENING MAMMOGRAM: ICD-10-CM

## 2020-03-04 PROCEDURE — 77063 BREAST TOMOSYNTHESIS BI: CPT

## 2020-03-04 PROCEDURE — 77067 SCR MAMMO BI INCL CAD: CPT

## 2020-08-10 RX ORDER — ATORVASTATIN CALCIUM 20 MG/1
TABLET, FILM COATED ORAL
Qty: 90 TABLET | Refills: 0 | Status: SHIPPED | OUTPATIENT
Start: 2020-08-10 | End: 2020-11-19

## 2020-08-28 RX ORDER — LISINOPRIL 30 MG/1
TABLET ORAL
Qty: 90 TABLET | Refills: 1 | Status: SHIPPED | OUTPATIENT
Start: 2020-08-28 | End: 2020-11-18 | Stop reason: SDUPTHER

## 2020-10-08 DIAGNOSIS — R73.9 HYPERGLYCEMIA: ICD-10-CM

## 2020-10-08 DIAGNOSIS — I10 ESSENTIAL HYPERTENSION: Primary | ICD-10-CM

## 2020-10-08 DIAGNOSIS — E78.5 HYPERLIPIDEMIA, UNSPECIFIED HYPERLIPIDEMIA TYPE: ICD-10-CM

## 2020-10-09 ENCOUNTER — FLU SHOT (OUTPATIENT)
Dept: FAMILY MEDICINE CLINIC | Facility: CLINIC | Age: 55
End: 2020-10-09

## 2020-10-09 DIAGNOSIS — Z23 NEED FOR VACCINATION: Primary | ICD-10-CM

## 2020-10-09 LAB
ALBUMIN SERPL-MCNC: 4.8 G/DL (ref 3.5–5.2)
ALBUMIN/GLOB SERPL: 2.3 G/DL
ALP SERPL-CCNC: 77 U/L (ref 39–117)
ALT SERPL-CCNC: 31 U/L (ref 1–33)
AST SERPL-CCNC: 20 U/L (ref 1–32)
BASOPHILS # BLD AUTO: 0.03 10*3/MM3 (ref 0–0.2)
BASOPHILS NFR BLD AUTO: 0.5 % (ref 0–1.5)
BILIRUB SERPL-MCNC: 0.4 MG/DL (ref 0–1.2)
BUN SERPL-MCNC: 13 MG/DL (ref 6–20)
BUN/CREAT SERPL: 15.7 (ref 7–25)
CALCIUM SERPL-MCNC: 9.2 MG/DL (ref 8.6–10.5)
CHLORIDE SERPL-SCNC: 102 MMOL/L (ref 98–107)
CHOLEST SERPL-MCNC: 153 MG/DL (ref 0–200)
CO2 SERPL-SCNC: 30.6 MMOL/L (ref 22–29)
CREAT SERPL-MCNC: 0.83 MG/DL (ref 0.57–1)
EOSINOPHIL # BLD AUTO: 0.11 10*3/MM3 (ref 0–0.4)
EOSINOPHIL NFR BLD AUTO: 1.8 % (ref 0.3–6.2)
ERYTHROCYTE [DISTWIDTH] IN BLOOD BY AUTOMATED COUNT: 13.5 % (ref 12.3–15.4)
GLOBULIN SER CALC-MCNC: 2.1 GM/DL
GLUCOSE SERPL-MCNC: 112 MG/DL (ref 65–99)
HBA1C MFR BLD: 5.7 % (ref 4.8–5.6)
HCT VFR BLD AUTO: 40.4 % (ref 34–46.6)
HDLC SERPL-MCNC: 56 MG/DL (ref 40–60)
HGB BLD-MCNC: 13.6 G/DL (ref 12–15.9)
IMM GRANULOCYTES # BLD AUTO: 0.01 10*3/MM3 (ref 0–0.05)
IMM GRANULOCYTES NFR BLD AUTO: 0.2 % (ref 0–0.5)
LDLC SERPL CALC-MCNC: 72 MG/DL (ref 0–100)
LDLC/HDLC SERPL: 1.21 {RATIO}
LYMPHOCYTES # BLD AUTO: 2.5 10*3/MM3 (ref 0.7–3.1)
LYMPHOCYTES NFR BLD AUTO: 42 % (ref 19.6–45.3)
MCH RBC QN AUTO: 30 PG (ref 26.6–33)
MCHC RBC AUTO-ENTMCNC: 33.7 G/DL (ref 31.5–35.7)
MCV RBC AUTO: 89 FL (ref 79–97)
MONOCYTES # BLD AUTO: 0.47 10*3/MM3 (ref 0.1–0.9)
MONOCYTES NFR BLD AUTO: 7.9 % (ref 5–12)
NEUTROPHILS # BLD AUTO: 2.83 10*3/MM3 (ref 1.7–7)
NEUTROPHILS NFR BLD AUTO: 47.6 % (ref 42.7–76)
NRBC BLD AUTO-RTO: 0 /100 WBC (ref 0–0.2)
PLATELET # BLD AUTO: 311 10*3/MM3 (ref 140–450)
POTASSIUM SERPL-SCNC: 3.9 MMOL/L (ref 3.5–5.2)
PROT SERPL-MCNC: 6.9 G/DL (ref 6–8.5)
RBC # BLD AUTO: 4.54 10*6/MM3 (ref 3.77–5.28)
SODIUM SERPL-SCNC: 142 MMOL/L (ref 136–145)
T4 FREE SERPL-MCNC: 1.14 NG/DL (ref 0.93–1.7)
TRIGL SERPL-MCNC: 147 MG/DL (ref 0–150)
TSH SERPL DL<=0.005 MIU/L-ACNC: 2.24 UIU/ML (ref 0.27–4.2)
VLDLC SERPL CALC-MCNC: 25 MG/DL (ref 5–40)
WBC # BLD AUTO: 5.95 10*3/MM3 (ref 3.4–10.8)

## 2020-10-09 PROCEDURE — 90471 IMMUNIZATION ADMIN: CPT | Performed by: INTERNAL MEDICINE

## 2020-10-09 PROCEDURE — 90686 IIV4 VACC NO PRSV 0.5 ML IM: CPT | Performed by: INTERNAL MEDICINE

## 2020-10-13 ENCOUNTER — OFFICE VISIT (OUTPATIENT)
Dept: FAMILY MEDICINE CLINIC | Facility: CLINIC | Age: 55
End: 2020-10-13

## 2020-10-13 VITALS
SYSTOLIC BLOOD PRESSURE: 118 MMHG | TEMPERATURE: 97.7 F | WEIGHT: 179 LBS | HEIGHT: 66 IN | OXYGEN SATURATION: 95 % | HEART RATE: 68 BPM | BODY MASS INDEX: 28.77 KG/M2 | DIASTOLIC BLOOD PRESSURE: 68 MMHG

## 2020-10-13 DIAGNOSIS — R12 HEARTBURN: ICD-10-CM

## 2020-10-13 DIAGNOSIS — I10 ESSENTIAL HYPERTENSION: Primary | ICD-10-CM

## 2020-10-13 DIAGNOSIS — G47.00 INSOMNIA, UNSPECIFIED TYPE: ICD-10-CM

## 2020-10-13 DIAGNOSIS — R73.9 HYPERGLYCEMIA: ICD-10-CM

## 2020-10-13 DIAGNOSIS — E78.5 HYPERLIPIDEMIA, UNSPECIFIED HYPERLIPIDEMIA TYPE: ICD-10-CM

## 2020-10-13 PROCEDURE — 99214 OFFICE O/P EST MOD 30 MIN: CPT | Performed by: INTERNAL MEDICINE

## 2020-10-13 RX ORDER — TRAZODONE HYDROCHLORIDE 50 MG/1
50 TABLET ORAL NIGHTLY PRN
Qty: 90 TABLET | Refills: 1 | Status: SHIPPED | OUTPATIENT
Start: 2020-10-13 | End: 2021-01-08 | Stop reason: SDUPTHER

## 2020-10-13 NOTE — PROGRESS NOTES
"Subjective   Julisa Nixon is a 55 y.o. female who comes in today for   Chief Complaint   Patient presents with   • Hyperlipidemia     FOLLOW UP    .    History of Present Illness   HERE FOR F/U on HL on lipitor 20 mg qd.  Takes trazodone for insomnia--working well and needs refill.  She had shoulder pain in the past and saw ortho Dr. Lloyd Herrera and he did a shoulder injection and that really helped.  She saw NSG Dr. Ewing and he sent her twice for MRI one for Lspine which was good and last week for her tailbone and SI joint.  She has a f/u appt with him this week.  She initially lost 10 pounds during covid but has gained some of it back.  Taking lisinopril 30 mg qd for HTN>  Well controlled.    The following portions of the patient's history were reviewed and updated as appropriate: allergies, current medications, past family history, past medical history, past social history, past surgical history and problem list.    Review of Systems   Constitutional: Negative.    Musculoskeletal: Positive for arthralgias.   Psychiatric/Behavioral: Positive for sleep disturbance.       /68   Pulse 68   Temp 97.7 °F (36.5 °C) (Temporal)   Ht 167.6 cm (65.98\")   Wt 81.2 kg (179 lb)   SpO2 95%   BMI 28.91 kg/m²     STEADI Fall Risk Assessment has not been completed.    PHQ-2/PHQ-9 Depression Screening 10/13/2020   Little interest or pleasure in doing things 0   Feeling down, depressed, or hopeless 1   Total Score 1       Objective   Physical Exam  Vitals signs and nursing note reviewed.   Constitutional:       Appearance: Normal appearance. She is well-developed.   HENT:      Head: Normocephalic and atraumatic.      Right Ear: External ear normal.      Left Ear: External ear normal.   Eyes:      Extraocular Movements: Extraocular movements intact.      Conjunctiva/sclera: Conjunctivae normal.   Neck:      Musculoskeletal: Neck supple.   Cardiovascular:      Rate and Rhythm: Normal rate and regular rhythm.      " Heart sounds: Normal heart sounds.      Comments: No bruits  Pulmonary:      Effort: Pulmonary effort is normal. No respiratory distress.      Breath sounds: Normal breath sounds. No wheezing or rales.   Abdominal:      General: Bowel sounds are normal. There is no distension.      Palpations: Abdomen is soft. There is no mass.      Tenderness: There is no abdominal tenderness.   Lymphadenopathy:      Cervical: No cervical adenopathy.   Skin:     General: Skin is warm.   Neurological:      Mental Status: She is alert and oriented to person, place, and time. Mental status is at baseline.   Psychiatric:         Mood and Affect: Mood normal.         Behavior: Behavior normal.         Thought Content: Thought content normal.         Judgment: Judgment normal.           Current Outpatient Medications:   •  aspirin 81 MG oral suspension, , Disp: , Rfl:   •  atorvastatin (LIPITOR) 20 MG tablet, TAKE ONE TABLET BY MOUTH DAILY, Disp: 90 tablet, Rfl: 0  •  lisinopril (PRINIVIL,ZESTRIL) 30 MG tablet, TAKE ONE TABLET BY MOUTH DAILY, Disp: 90 tablet, Rfl: 1  •  traZODone (DESYREL) 50 MG tablet, Take 1 tablet by mouth At Night As Needed for Sleep., Disp: 90 tablet, Rfl: 1    Assessment/Plan   Diagnoses and all orders for this visit:    1. Essential hypertension (Primary)    2. Hyperlipidemia, unspecified hyperlipidemia type    3. Insomnia, unspecified type    4. Hyperglycemia    5. Heartburn    Other orders  -     traZODone (DESYREL) 50 MG tablet; Take 1 tablet by mouth At Night As Needed for Sleep.  Dispense: 90 tablet; Refill: 1      Her blood pressure is well controlled on lisinopril 30 mg daily.  Continue Lipitor 20 mg daily for cholesterol.  Fasting labs reviewed and look actually very good.  Elevated blood sugar has improved with A1c dropping.  Continue dietary changes and weight loss and exercise.  Refill trazodone 50 mg that she takes as needed at night for sleep which is working very well for her insomnia.  Follow-up 6  months.             I have asked for the patient to return to clinic in 6month(s).

## 2020-11-18 NOTE — TELEPHONE ENCOUNTER
PATIENT REQUESTED A REFILL ON atorvastatin (LIPITOR) 20 MG tablet\  lisinopril (PRINIVIL,ZESTRIL) 30 MG tablet      PATIENT CAN BE REACHED ON:[phone]    PHARMACY PREFERRED:[facility] [address] [place] [address] [id number] [facility] - [phone] [facility] [phone] FX

## 2020-11-19 RX ORDER — LISINOPRIL 30 MG/1
30 TABLET ORAL DAILY
Qty: 90 TABLET | Refills: 1 | Status: SHIPPED | OUTPATIENT
Start: 2020-11-19 | End: 2021-01-08 | Stop reason: SDUPTHER

## 2020-11-19 RX ORDER — ATORVASTATIN CALCIUM 20 MG/1
TABLET, FILM COATED ORAL
Qty: 90 TABLET | Refills: 0 | Status: SHIPPED | OUTPATIENT
Start: 2020-11-19 | End: 2021-01-08 | Stop reason: SDUPTHER

## 2021-01-08 RX ORDER — TRAZODONE HYDROCHLORIDE 50 MG/1
50 TABLET ORAL NIGHTLY PRN
Qty: 90 TABLET | Refills: 1 | Status: SHIPPED | OUTPATIENT
Start: 2021-01-08 | End: 2021-05-06 | Stop reason: SDUPTHER

## 2021-01-08 RX ORDER — ATORVASTATIN CALCIUM 20 MG/1
20 TABLET, FILM COATED ORAL DAILY
Qty: 90 TABLET | Refills: 1 | Status: SHIPPED | OUTPATIENT
Start: 2021-01-08 | End: 2021-05-17 | Stop reason: SDUPTHER

## 2021-01-08 RX ORDER — LISINOPRIL 30 MG/1
30 TABLET ORAL DAILY
Qty: 90 TABLET | Refills: 1 | Status: SHIPPED | OUTPATIENT
Start: 2021-01-08 | End: 2021-05-17 | Stop reason: SDUPTHER

## 2021-01-08 NOTE — TELEPHONE ENCOUNTER
Caller: Julisa Nixon    Relationship: Self    Best call back number: [phone]    Medication needed:   Requested Prescriptions     Pending Prescriptions Disp Refills   • traZODone (DESYREL) 50 MG tablet 90 tablet 1     Sig: Take 1 tablet by mouth At Night As Needed for Sleep.   • lisinopril (PRINIVIL,ZESTRIL) 30 MG tablet 90 tablet 1     Sig: Take 1 tablet by mouth Daily.   • atorvastatin (LIPITOR) 20 MG tablet 90 tablet 0     Sig: Take 1 tablet by mouth Daily.       When do you need the refill by: 1/11/2021    What details did the patient provide when requesting the medication: PATIENT STATED HER  LOST HIS JOB AND THEY WILL BE LOSING INSURANCE/COVERAGE. SHE IS REQUESTING SCRIPTS BE FILLED IN ADVANCE, BEFORE INSURANCE IS CUT OFF 1/31/2021.    Does the patient have less than a 3 day supply:  [] Yes  [x] No    What is the patient's preferred pharmacy: [facility] [address]  AT [facility] [phone] [facility] [phone]

## 2021-01-14 ENCOUNTER — TELEPHONE (OUTPATIENT)
Dept: OBSTETRICS AND GYNECOLOGY | Facility: CLINIC | Age: 56
End: 2021-01-14

## 2021-01-14 NOTE — TELEPHONE ENCOUNTER
Patient called stating her  lost his job due to covid and her insurance runs out the end of January. She is currently scheduled in February for her annual but she wants to know if you could see her for her annual the last week of January before her insurance runs out please advise?

## 2021-01-14 NOTE — TELEPHONE ENCOUNTER
He annual last year was on the 23rd so I was going to see if you were okay with seeing her between the 23rd and end of the month.

## 2021-01-14 NOTE — TELEPHONE ENCOUNTER
I am happy to see her but is her insurance going to penalize her for having her annual done early?

## 2021-01-27 ENCOUNTER — OFFICE VISIT (OUTPATIENT)
Dept: FAMILY MEDICINE CLINIC | Facility: CLINIC | Age: 56
End: 2021-01-27

## 2021-01-27 ENCOUNTER — OFFICE VISIT (OUTPATIENT)
Dept: OBSTETRICS AND GYNECOLOGY | Facility: CLINIC | Age: 56
End: 2021-01-27

## 2021-01-27 VITALS
WEIGHT: 181 LBS | DIASTOLIC BLOOD PRESSURE: 81 MMHG | SYSTOLIC BLOOD PRESSURE: 141 MMHG | OXYGEN SATURATION: 95 % | RESPIRATION RATE: 16 BRPM | TEMPERATURE: 98.2 F | HEIGHT: 66 IN | HEART RATE: 95 BPM | BODY MASS INDEX: 29.09 KG/M2

## 2021-01-27 VITALS
BODY MASS INDEX: 29.15 KG/M2 | SYSTOLIC BLOOD PRESSURE: 124 MMHG | HEIGHT: 66 IN | WEIGHT: 181.4 LBS | DIASTOLIC BLOOD PRESSURE: 74 MMHG

## 2021-01-27 DIAGNOSIS — Z01.419 PAP SMEAR, LOW-RISK: Primary | ICD-10-CM

## 2021-01-27 DIAGNOSIS — Z13.9 SCREENING FOR UNSPECIFIED CONDITION: ICD-10-CM

## 2021-01-27 DIAGNOSIS — Z11.51 SPECIAL SCREENING EXAMINATION FOR HUMAN PAPILLOMAVIRUS (HPV): ICD-10-CM

## 2021-01-27 DIAGNOSIS — R21 RASH: Primary | ICD-10-CM

## 2021-01-27 DIAGNOSIS — R10.31 PAIN, ABDOMINAL, RLQ: ICD-10-CM

## 2021-01-27 DIAGNOSIS — Z01.419 ROUTINE GYNECOLOGICAL EXAMINATION: ICD-10-CM

## 2021-01-27 LAB
BILIRUB BLD-MCNC: NEGATIVE MG/DL
CLARITY, POC: CLEAR
COLOR UR: YELLOW
GLUCOSE UR STRIP-MCNC: NEGATIVE MG/DL
KETONES UR QL: NEGATIVE
LEUKOCYTE EST, POC: NEGATIVE
NITRITE UR-MCNC: NEGATIVE MG/ML
PH UR: 5 [PH] (ref 5–8)
PROT UR STRIP-MCNC: NEGATIVE MG/DL
RBC # UR STRIP: NEGATIVE /UL
SP GR UR: 1.01 (ref 1–1.03)
UROBILINOGEN UR QL: NORMAL

## 2021-01-27 PROCEDURE — 99213 OFFICE O/P EST LOW 20 MIN: CPT | Performed by: OBSTETRICS & GYNECOLOGY

## 2021-01-27 PROCEDURE — 99396 PREV VISIT EST AGE 40-64: CPT | Performed by: OBSTETRICS & GYNECOLOGY

## 2021-01-27 PROCEDURE — 81002 URINALYSIS NONAUTO W/O SCOPE: CPT | Performed by: OBSTETRICS & GYNECOLOGY

## 2021-01-27 PROCEDURE — 99213 OFFICE O/P EST LOW 20 MIN: CPT | Performed by: NURSE PRACTITIONER

## 2021-01-27 NOTE — PROGRESS NOTES
GYN Annual Exam     CC- Here for annual exam.     Julisa Nixon is a 55 y.o. female established patient who presents for annual well woman exam. NO  VB. She has had another hard year. She is having RLQ pain and it has been evaluated as musculoskeletal in origin but she is worried it is her ovary.  Her  lost his job and her daughter in [place] had a stillbirth at term. She did not have her C scope due to fear of COVID but will call for a referral when she is ready.     OB History        2    Para   2    Term   2            AB        Living   2       SAB        TAB        Ectopic        Molar        Multiple        Live Births              Obstetric Comments   2              Menarche:11  Menopause:44  HRT:none  Current contraception: status post hysterectomy- s/p TLH with OC for DUB age 44  History of abnormal Pap smear: no  History of abnormal mammogram: yes - B9 cyst bx  Family history of uterine, colon or ovarian cancer: yes - PGF and P aunt colon  Family history of breast cancer: no  STD's: none  Last pap smear:2019- nl x 2  VIOLETA: none      Health Maintenance   Topic Date Due   • HEPATITIS C SCREENING  07/15/2016   • ANNUAL PHYSICAL  2018   • ZOSTER VACCINE (1 of 2) 10/22/2021 (Originally 2013)   • Annual Gynecologic Pelvic and Breast Exam  2021   • LIPID PANEL  10/09/2021   • PAP SMEAR  2022   • MAMMOGRAM  2022   • COLONOSCOPY  2024   • TDAP/TD VACCINES (2 - Td) 2028   • INFLUENZA VACCINE  Completed   • Pneumococcal Vaccine 0-64  Aged Out   • MENINGOCOCCAL VACCINE  Aged Out       Past Medical History:   Diagnosis Date   • Hypertension    • Shoulder pain, right        Past Surgical History:   Procedure Laterality Date   • BREAST BIOPSY     •  SECTION     • COLONOSCOPY     • HYSTERECTOMY      TLH with OC age 44 for DUB   • VAGINAL HYSTERECTOMY           Current Outpatient Medications:   •  aspirin 81 MG oral suspension, , Disp: ,  "Rfl:   •  atorvastatin (LIPITOR) 20 MG tablet, Take 1 tablet by mouth Daily., Disp: 90 tablet, Rfl: 1  •  lisinopril (PRINIVIL,ZESTRIL) 30 MG tablet, Take 1 tablet by mouth Daily., Disp: 90 tablet, Rfl: 1  •  traZODone (DESYREL) 50 MG tablet, Take 1 tablet by mouth At Night As Needed for Sleep., Disp: 90 tablet, Rfl: 1    No Known Allergies    Social History     Tobacco Use   • Smoking status: Never Smoker   • Smokeless tobacco: Never Used   Substance Use Topics   • Alcohol use: Yes     Comment: rare   • Drug use: No       Family History   Problem Relation Age of Onset   • Stroke Father    • Colon cancer Paternal Grandfather    • Colon cancer Paternal Aunt    • Breast cancer Neg Hx    • Ovarian cancer Neg Hx    • Deep vein thrombosis Neg Hx    • Pulmonary embolism Neg Hx        Review of Systems   Constitutional: Positive for activity change. Negative for appetite change, fatigue, fever and unexpected weight change.   Respiratory: Negative for cough and shortness of breath.    Cardiovascular: Negative for chest pain and palpitations.   Gastrointestinal: Positive for abdominal pain. Negative for abdominal distention, constipation, diarrhea and nausea.   Endocrine: Negative for cold intolerance and heat intolerance.   Genitourinary: Positive for pelvic pain. Negative for dyspareunia, dysuria, menstrual problem, vaginal bleeding and vaginal discharge.   Musculoskeletal: Positive for arthralgias, back pain, gait problem, neck pain and neck stiffness.   Skin: Negative for color change and rash.   Neurological: Negative for headaches.   Psychiatric/Behavioral: Negative for dysphoric mood. The patient is not nervous/anxious.        /74   Ht 167.6 cm (66\")   Wt 82.3 kg (181 lb 6.4 oz)   BMI 29.28 kg/m²     Physical Exam   Constitutional: She is oriented to person, place, and time. She appears well-developed.   HENT:   Head: Normocephalic and atraumatic.   Eyes: Conjunctivae are normal. No scleral icterus.   Neck: " Neck supple. No thyromegaly present.   Cardiovascular: Normal rate and regular rhythm.   Pulmonary/Chest: Effort normal and breath sounds normal. Right breast exhibits no inverted nipple, no mass, no nipple discharge, no skin change and no tenderness. Left breast exhibits no inverted nipple, no mass, no nipple discharge, no skin change and no tenderness.   Abdominal: Soft. Normal appearance and bowel sounds are normal. She exhibits no distension and no mass. There is no abdominal tenderness. There is no rebound and no guarding. No hernia.   Genitourinary:    Pelvic exam was performed with patient supine.   There is no rash, tenderness, lesion or injury on the right labia. There is no rash, tenderness, lesion or injury on the left labia. Right adnexum displays tenderness. Right adnexum displays no mass and no fullness. Left adnexum displays no mass, no tenderness and no fullness.    No vaginal discharge, erythema, tenderness or bleeding.   No erythema, tenderness or bleeding in the vagina.    No foreign body in the vagina.      No signs of injury in the vagina.      Genitourinary Comments: Normal cuff  Mild TTP in RLQ     Neurological: She is alert and oriented to person, place, and time.   Skin: Skin is warm and dry.   Psychiatric: Her behavior is normal. Mood, judgment and thought content normal.   Nursing note and vitals reviewed.         Assessment/Plan    1) GYN HM: normal pap/HPV 1/2019, check pap/HPV SBE demonstrated and encouraged.  2) STD screening: declines Condoms encouraged.  3) Bone health - Weight bearing exercise, dietary calcium recommendations and vitamin D reviewed.   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   8) DEXA- UTD 2/2019- normal. Repeat in 5 years  9)C scope- due 9/2019, enc compliance. Pt declines referral until she is vaccinated for COVID.   10) RLQ pain- schedule TVUS ( no US tech available  today). Xray of hip and MRI of spine reviewed. We discussed that if her US is normal, it is imperative that she f/u for C scope.   11)Parts of this document have been copied or forwarded from her previous visits and have been reviewed, updated and edited as indicated.   12) I saw the patient with a face mask, gloves and eye protection  The patient herself was masked.  Social distancing was observed as appropriate.  13)Follow up prn and 1 year       Diagnoses and all orders for this visit:    1. Pap smear, low-risk (Primary)  -     IgP, Aptima HPV    2. Screening for unspecified condition  -     POC Urinalysis Dipstick    3. Routine gynecological examination  -     IgP, Aptima HPV    4. Special screening examination for human papillomavirus (HPV)  -     IgP, Aptima HPV    5. Pain, abdominal, RLQ        Ariana Jossy Salcedo MD  1/27/2021  11:28 EST

## 2021-01-27 NOTE — PATIENT INSTRUCTIONS
Discharge instructions    Zyrtec 10 mg at bedtime less drowsy for no drowsy antihistamine  Take this for the next 3 weeks or so    If severe rash fever difficulty breathing emergency room  Otherwise see dermatology if not improving over the next several weeks  See me in follow-up in about 3 weeks and will refer you if is not resolved    KN95 mask high filtration mask Yodio  kingfa good brand or other

## 2021-01-27 NOTE — PROGRESS NOTES
"Chief Complaint  Rash (rash on both arms x 2 weeks- unknown as to the reason )    Subjective          Julisa Nixon presents to [facility] for   Patient complains of a mild rash bilateral volar surface of her forearms x2 weeks approximately which does not hurt or itch blanches, and causing no other problems  She spoke to a pharmacist suggested hydrocortisone cream after 1 week no help then antifungal 1 week no help.  Symptoms are mild she just thought it was unusual  No new soaps or detergents no history of hives no rotating rash no lip swelling or other complaints  Generally healthy and nothing makes better or worse  No new soaps detergents etc.    Rash        Objective   Vital Signs:   /81   Pulse 95   Temp 98.2 °F (36.8 °C)   Resp 16   Ht 167.6 cm (66\")   Wt 82.1 kg (181 lb)   SpO2 95%   BMI 29.21 kg/m²     Physical Exam  Cardiovascular:      Rate and Rhythm: Normal rate and regular rhythm.   Pulmonary:      Breath sounds: Normal breath sounds.   Skin:     General: Skin is warm and dry.      Findings: No rash.      Comments: Mild, rash mildly erythemic, blotchy over the forearms bilateral no red raised rash but, positive blanching no petechiae no ulcerations pustules or any deep cellulitis  Remaining of her body is free of rash   Neurological:      General: No focal deficit present.      Mental Status: She is oriented to person, place, and time.   Psychiatric:         Mood and Affect: Mood normal.         Behavior: Behavior normal.         Thought Content: Thought content normal.        Result Review :                 Assessment and Plan    Problem List Items Addressed This Visit     None      Visit Diagnoses     Rash    -  Primary    Mild rash bilateral forearms suspicious for a mild allergic rash or hives          Follow Up   No follow-ups on file.  Patient was given instructions and counseling regarding her condition or for health maintenance advice. Please see " specific information pulled into the AVS if appropriate.     Patient has what appears to be  A mild vascular rash upper extremities which is nontender  With blanching and symmetrical  Likely a form of hives or allergy rash  She will follow instructions try some Zyrtec at bedtime since its mild any increased rash Benadryl  She will recheck by phone if not resolved after 2 to 3 weeks  And will refer to dermatology if not  Any severe rash fever increased pain  Shortness of breath or angioedema swelling emergency room    Follow-up Dr. Stewart routine  Zyrtec 10 mg at bedtime

## 2021-01-29 ENCOUNTER — TRANSCRIBE ORDERS (OUTPATIENT)
Dept: ADMINISTRATIVE | Facility: HOSPITAL | Age: 56
End: 2021-01-29

## 2021-01-29 DIAGNOSIS — Z12.39 SCREENING BREAST EXAMINATION: Primary | ICD-10-CM

## 2021-02-01 LAB
CYTOLOGIST CVX/VAG CYTO: NORMAL
CYTOLOGY CVX/VAG DOC CYTO: NORMAL
CYTOLOGY CVX/VAG DOC THIN PREP: NORMAL
DX ICD CODE: NORMAL
HIV 1 & 2 AB SER-IMP: NORMAL
HPV I/H RISK 4 DNA CVX QL PROBE+SIG AMP: NEGATIVE
Lab: NORMAL
OTHER STN SPEC: NORMAL
STAT OF ADQ CVX/VAG CYTO-IMP: NORMAL

## 2021-02-05 ENCOUNTER — TELEPHONE (OUTPATIENT)
Dept: OBSTETRICS AND GYNECOLOGY | Facility: CLINIC | Age: 56
End: 2021-02-05

## 2021-03-15 ENCOUNTER — OFFICE VISIT (OUTPATIENT)
Dept: FAMILY MEDICINE CLINIC | Facility: CLINIC | Age: 56
End: 2021-03-15

## 2021-03-15 VITALS
DIASTOLIC BLOOD PRESSURE: 85 MMHG | OXYGEN SATURATION: 92 % | TEMPERATURE: 98.2 F | BODY MASS INDEX: 28.77 KG/M2 | HEIGHT: 66 IN | SYSTOLIC BLOOD PRESSURE: 147 MMHG | HEART RATE: 103 BPM | WEIGHT: 179 LBS

## 2021-03-15 DIAGNOSIS — R73.09 ELEVATED HEMOGLOBIN A1C: ICD-10-CM

## 2021-03-15 DIAGNOSIS — R68.89 EXERCISE INTOLERANCE: ICD-10-CM

## 2021-03-15 DIAGNOSIS — R00.2 PALPITATIONS: Primary | ICD-10-CM

## 2021-03-15 PROCEDURE — 99213 OFFICE O/P EST LOW 20 MIN: CPT | Performed by: NURSE PRACTITIONER

## 2021-03-15 PROCEDURE — 93000 ELECTROCARDIOGRAM COMPLETE: CPT | Performed by: NURSE PRACTITIONER

## 2021-03-15 NOTE — PROGRESS NOTES
"Chief Complaint  right shoulder and leg  pain    Subjective          Julisa Nixon presents to [facility]  Patient complains of palpitations and weakness in the feelings of exhaustion with activities as well as some palpitations in her chest when she blow dries her hair works on her hair with her right upper extremity or when she walks or does different activities.  She has no chest pain per se no diaphoresis sweatiness or nausea she is having no chest pain presently  She does have chronic shoulder pain as well as rotator cuff disease and has seen specialist in the past.  Her greatest concern is the exercises and endurance difficulties as well as the palpitations and fatigue when blow drying etc.  Her symptoms were across the board and sometimes she has some mild headaches, as well as some leg pain, but these do not seem to be occurring simultaneously and I do not think they are related  She does not have severe headaches vision changes paresthesias or any unilateral paresthesia or weakness to suggest TIA or stroke  And I carefully spent quite a bit of time to make sure there is no suggestion of this    She will return office should she have any back pains leg pains or paresthesias or sciatica as    History rheumatic fever as a child but no chronic heart disease echocardiogram several years ago      Objective   Vital Signs:   /85   Pulse 103   Temp 98.2 °F (36.8 °C) (Temporal)   Ht 167.6 cm (66\")   Wt 81.2 kg (179 lb)   SpO2 92%   BMI 28.89 kg/m²     Physical Exam  Vitals reviewed.   Constitutional:       Appearance: She is well-developed.   HENT:      Head: Normocephalic.      Nose: Nose normal.   Eyes:      General: No scleral icterus.     Conjunctiva/sclera: Conjunctivae normal.      Pupils: Pupils are equal, round, and reactive to light.   Neck:      Thyroid: No thyromegaly.      Vascular: No JVD.   Cardiovascular:      Rate and Rhythm: Normal rate and regular " rhythm.      Heart sounds: Normal heart sounds. No murmur. No friction rub. No gallop.    Pulmonary:      Effort: Pulmonary effort is normal. No respiratory distress.      Breath sounds: Normal breath sounds. No stridor. No wheezing or rales.   Abdominal:      General: Bowel sounds are normal. There is no distension.      Palpations: Abdomen is soft.      Tenderness: There is no abdominal tenderness.      Comments: No hepatosplenomegaly, no ascites,   Musculoskeletal:         General: No tenderness.      Cervical back: Neck supple.   Lymphadenopathy:      Cervical: No cervical adenopathy.   Skin:     General: Skin is warm and dry.      Findings: No erythema or rash.   Neurological:      General: No focal deficit present.      Mental Status: She is alert and oriented to person, place, and time. Mental status is at baseline.      Cranial Nerves: No cranial nerve deficit.      Sensory: No sensory deficit.      Motor: No weakness.      Coordination: Coordination normal.      Gait: Gait normal.      Deep Tendon Reflexes: Reflexes are normal and symmetric. Reflexes normal.   Psychiatric:         Behavior: Behavior normal.         Thought Content: Thought content normal.         Judgment: Judgment normal.        Result Review :                 Assessment and Plan    Diagnoses and all orders for this visit:    1. Palpitations (Primary)  -     Magnesium  -     CBC & Differential  -     Comprehensive Metabolic Panel  -     TSH Rfx On Abnormal To Free T4  -     Hemoglobin A1c  -     ECG 12 Lead  -     Ambulatory Referral to Cardiology    2. Exercise intolerance  -     Magnesium  -     CBC & Differential  -     Comprehensive Metabolic Panel  -     TSH Rfx On Abnormal To Free T4  -     Hemoglobin A1c  -     ECG 12 Lead  -     Ambulatory Referral to Cardiology    3. Elevated hemoglobin A1c  -     Magnesium  -     CBC & Differential  -     Comprehensive Metabolic Panel  -     TSH Rfx On Abnormal To Free T4  -     Hemoglobin A1c  -      ECG 12 Lead  -     Ambulatory Referral to Cardiology        Follow Up   No follow-ups on file.  Patient was given instructions and counseling regarding her condition or for health maintenance advice. Please see specific information pulled into the AVS if appropriate.   Any chest pain shortness of breath weakness nausea vomiting racing heart shortness of breath syncope confusion slurred speech unilateral paresthesias 911    Otherwise we will send her to cardiology to evaluate exertional fatigue as well as palpitations related to exertion  She will follow-up with Dr. Stewart for other concerns should she be having back pain leg pains  Anything severe emergency room  Any new onset headaches or paresthesias if severe emergency room otherwise follow-up for further evaluation or see neurology  Office visit 30-minute  Continue present medications Tylenol as needed

## 2021-03-15 NOTE — PATIENT INSTRUCTIONS
Discharge instructions plenty fluids  Avoid excessive caffeine or stimulants  If increased chest pain shortness of breath weakness slurred speech vision loss gait difficulties emergency room immediately 911    Otherwise see cardiology for further evaluation of your palpitations and exercise intolerance  Continue aspirin continue present care your blood pressure rechecked today looks nice 122/80    Should you develop any chronic or new onset of headaches if severe or worst ever emergency room otherwise follow-up for further evaluation with an MRI    You have a small palpable mass or nodule immediately inferior to the right parotid you have had an extensive evaluation including CAT scans and MRI   An ENT evaluation as well should you have increasing pain difficulties any problem getting bigger any issues at all you should reach out to me immediately and follow-up with ENT for further evaluation including biopsy      We did not discuss leg pains or other issue should you have any chronic leg pain pelvic pain back pain or any sciatica complaints  Make sure you return to office discuss for early with Dr. Stewart or myself if you are unable to follow-up with

## 2021-03-16 DIAGNOSIS — M25.511 RIGHT SHOULDER PAIN, UNSPECIFIED CHRONICITY: Primary | ICD-10-CM

## 2021-03-16 LAB
ALBUMIN SERPL-MCNC: 4.7 G/DL (ref 3.5–5.2)
ALBUMIN/GLOB SERPL: 1.6 G/DL
ALP SERPL-CCNC: 84 U/L (ref 39–117)
ALT SERPL-CCNC: 33 U/L (ref 1–33)
AST SERPL-CCNC: 24 U/L (ref 1–32)
BASOPHILS # BLD AUTO: 0.04 10*3/MM3 (ref 0–0.2)
BASOPHILS NFR BLD AUTO: 0.6 % (ref 0–1.5)
BILIRUB SERPL-MCNC: 0.4 MG/DL (ref 0–1.2)
BUN SERPL-MCNC: 11 MG/DL (ref 6–20)
BUN/CREAT SERPL: 13.3 (ref 7–25)
CALCIUM SERPL-MCNC: 9.9 MG/DL (ref 8.6–10.5)
CHLORIDE SERPL-SCNC: 100 MMOL/L (ref 98–107)
CO2 SERPL-SCNC: 28.3 MMOL/L (ref 22–29)
CREAT SERPL-MCNC: 0.83 MG/DL (ref 0.57–1)
EOSINOPHIL # BLD AUTO: 0.05 10*3/MM3 (ref 0–0.4)
EOSINOPHIL NFR BLD AUTO: 0.8 % (ref 0.3–6.2)
ERYTHROCYTE [DISTWIDTH] IN BLOOD BY AUTOMATED COUNT: 13.3 % (ref 12.3–15.4)
GLOBULIN SER CALC-MCNC: 3 GM/DL
GLUCOSE SERPL-MCNC: 100 MG/DL (ref 65–99)
HBA1C MFR BLD: 6.1 % (ref 4.8–5.6)
HCT VFR BLD AUTO: 46.5 % (ref 34–46.6)
HGB BLD-MCNC: 15.4 G/DL (ref 12–15.9)
IMM GRANULOCYTES # BLD AUTO: 0.01 10*3/MM3 (ref 0–0.05)
IMM GRANULOCYTES NFR BLD AUTO: 0.2 % (ref 0–0.5)
LYMPHOCYTES # BLD AUTO: 2.13 10*3/MM3 (ref 0.7–3.1)
LYMPHOCYTES NFR BLD AUTO: 32.2 % (ref 19.6–45.3)
MAGNESIUM SERPL-MCNC: 2.4 MG/DL (ref 1.6–2.6)
MCH RBC QN AUTO: 29.5 PG (ref 26.6–33)
MCHC RBC AUTO-ENTMCNC: 33.1 G/DL (ref 31.5–35.7)
MCV RBC AUTO: 89.1 FL (ref 79–97)
MONOCYTES # BLD AUTO: 0.66 10*3/MM3 (ref 0.1–0.9)
MONOCYTES NFR BLD AUTO: 10 % (ref 5–12)
NEUTROPHILS # BLD AUTO: 3.73 10*3/MM3 (ref 1.7–7)
NEUTROPHILS NFR BLD AUTO: 56.2 % (ref 42.7–76)
NRBC BLD AUTO-RTO: 0 /100 WBC (ref 0–0.2)
PLATELET # BLD AUTO: 363 10*3/MM3 (ref 140–450)
POTASSIUM SERPL-SCNC: 4.6 MMOL/L (ref 3.5–5.2)
PROT SERPL-MCNC: 7.7 G/DL (ref 6–8.5)
RBC # BLD AUTO: 5.22 10*6/MM3 (ref 3.77–5.28)
SODIUM SERPL-SCNC: 140 MMOL/L (ref 136–145)
TSH SERPL DL<=0.005 MIU/L-ACNC: 1.33 UIU/ML (ref 0.27–4.2)
WBC # BLD AUTO: 6.62 10*3/MM3 (ref 3.4–10.8)

## 2021-03-19 ENCOUNTER — HOSPITAL ENCOUNTER (OUTPATIENT)
Dept: MAMMOGRAPHY | Facility: HOSPITAL | Age: 56
Discharge: HOME | End: 2021-03-19
Admitting: OBSTETRICS & GYNECOLOGY

## 2021-03-19 DIAGNOSIS — Z12.39 SCREENING BREAST EXAMINATION: ICD-10-CM

## 2021-03-19 PROCEDURE — 77063 BREAST TOMOSYNTHESIS BI: CPT

## 2021-03-19 PROCEDURE — 77067 SCR MAMMO BI INCL CAD: CPT

## 2021-03-29 DIAGNOSIS — M25.511 RIGHT SHOULDER PAIN, UNSPECIFIED CHRONICITY: ICD-10-CM

## 2021-03-29 NOTE — PROGRESS NOTES
Procedure   Procedures     Adult ECG Report     Name: Julisa Nixon   Age: 55 y.o.   Gender: female       Rate: 89   Rhythm: normal sinus rhythm   QRS Axis: nml   KY Interval: 173   QRS Duration: 89   QTc: 405   Voltages: .   Conduction Disturbances: none   Other Abnormalities: none     Narrative Interpretation: Normal sinus rhythm indication palpitations no change EKG January 30, 2020

## 2021-03-30 ENCOUNTER — BULK ORDERING (OUTPATIENT)
Dept: CASE MANAGEMENT | Facility: OTHER | Age: 56
End: 2021-03-30

## 2021-03-30 DIAGNOSIS — Z23 IMMUNIZATION DUE: ICD-10-CM

## 2021-04-27 ENCOUNTER — OFFICE VISIT (OUTPATIENT)
Dept: CARDIOLOGY | Facility: CLINIC | Age: 56
End: 2021-04-27

## 2021-04-27 VITALS
DIASTOLIC BLOOD PRESSURE: 92 MMHG | SYSTOLIC BLOOD PRESSURE: 140 MMHG | TEMPERATURE: 98.6 F | HEIGHT: 66 IN | WEIGHT: 185 LBS | BODY MASS INDEX: 29.73 KG/M2 | HEART RATE: 110 BPM | OXYGEN SATURATION: 99 %

## 2021-04-27 DIAGNOSIS — R00.2 PALPITATIONS: Primary | ICD-10-CM

## 2021-04-27 DIAGNOSIS — E78.2 MIXED HYPERLIPIDEMIA: ICD-10-CM

## 2021-04-27 DIAGNOSIS — I10 ESSENTIAL HYPERTENSION: ICD-10-CM

## 2021-04-27 PROCEDURE — 99204 OFFICE O/P NEW MOD 45 MIN: CPT | Performed by: INTERNAL MEDICINE

## 2021-04-27 PROCEDURE — 93000 ELECTROCARDIOGRAM COMPLETE: CPT | Performed by: INTERNAL MEDICINE

## 2021-04-27 NOTE — PROGRESS NOTES
"      CARDIOLOGY    Ana Lilia Santillan MD    ENCOUNTER DATE:  04/27/2021    Julisa Nixon / 55 y.o. / female        CHIEF COMPLAINT / REASON FOR OFFICE VISIT     Palpitations (new patient)      HISTORY OF PRESENT ILLNESS       HPI    Julisa Nixon is a 55 y.o. female     This is a lady with a history of hyperlipidemia and hypertension.  In 01/2021, she was under a lot of stress because her  lost his job.  She was getting palpitations which were described as a quick fluttering sensation.  She has been having a lot of issues with her right shoulder which felt better after it got injected, but 8 months later started becoming painful again.  Sometimes the pain from her shoulder goes across the side of her chest.  She is no longer having palpitations since she started to relax.            REVIEW OF SYSTEMS     Review of Systems   Constitutional: Positive for weight gain. Negative for chills, fever and weight loss.   Cardiovascular: Negative for leg swelling.   Respiratory: Negative for cough, snoring and wheezing.    Hematologic/Lymphatic: Negative for bleeding problem. Does not bruise/bleed easily.   Skin: Negative for color change.   Musculoskeletal: Positive for joint pain and myalgias. Negative for falls.   Gastrointestinal: Negative for melena.   Genitourinary: Negative for hematuria.   Neurological: Negative for excessive daytime sleepiness.   Psychiatric/Behavioral: Negative for depression. The patient is not nervous/anxious.          VITAL SIGNS     Visit Vitals  /92 (BP Location: Left arm)   Pulse 110   Temp 98.6 °F (37 °C)   Ht 167.6 cm (66\")   Wt 83.9 kg (185 lb)   SpO2 99%   BMI 29.86 kg/m²         Wt Readings from Last 3 Encounters:   04/27/21 83.9 kg (185 lb)   03/15/21 81.2 kg (179 lb)   01/27/21 82.1 kg (181 lb)     Body mass index is 29.86 kg/m².      PHYSICAL EXAMINATION     Constitutional:       General: Not in acute distress.  Neck:      Vascular: No carotid bruit or JVD.   "   Pulmonary:      Effort: Pulmonary effort is normal.      Breath sounds: Normal breath sounds.   Cardiovascular:      Normal rate. Regular rhythm.      Murmurs: There is no murmur.   Psychiatric:         Mood and Affect: Mood and affect normal.           REVIEWED DATA       ECG 12 Lead    Date/Time: 4/27/2021 12:14 PM  Performed by: Ana Lilia Santillan MD  Authorized by: Ana Lilia Santillan MD   Comparison: compared with previous ECG from 10/11/2016  Similar to previous ECG  Rhythm: sinus rhythm  BPM: 102  Conduction: conduction normal  ST Segments: ST segments normal  T Waves: T waves normal    Clinical impression: normal ECG              Lipid Panel    Lipid Panel 10/9/20   Total Cholesterol 153   Triglycerides 147   HDL Cholesterol 56   VLDL Cholesterol 25   LDL Cholesterol  72   LDL/HDL Ratio 1.21             Lab Results   Component Value Date    GLUCOSE 85 09/05/2014    BUN 11 03/15/2021    CREATININE 0.83 03/15/2021    EGFRIFNONA 71 03/15/2021    EGFRIFAFRI 86 03/15/2021    BCR 13.3 03/15/2021    K 4.6 03/15/2021    CO2 28.3 03/15/2021    CALCIUM 9.9 03/15/2021    PROTENTOTREF 7.7 03/15/2021    ALBUMIN 4.70 03/15/2021    LABIL2 1.6 03/15/2021    AST 24 03/15/2021    ALT 33 03/15/2021       ASSESSMENT & PLAN      Diagnosis Plan   1. Palpitations         1.  Palpitations.  These sound consistent with PVCs.  She is no longer having them so I do not see a reason to put a monitor on at this time.  She had a normal echocardiogram back in 2015.      2.  Hypertension.  She reports good blood pressure control at home though has been eating a lot of salt here recently.    3.  Hyperlipidemia.      She had questions about predicting coronary disease so I recommended a calcium score and vascular screening and gave her the information on [facility] for that.      I will see her back as needed if her symptoms change.          Orders Placed This Encounter   Procedures   • ECG 12 Lead     This order was created via procedure  documentation     Order Specific Question:   Release to patient     Answer:   Immediate           MEDICATIONS         Discharge Medications          Accurate as of April 27, 2021 12:15 PM. If you have any questions, ask your nurse or doctor.            Continue These Medications      Instructions Start Date   aspirin 81 MG oral suspension   81 mg, Oral, Daily      atorvastatin 20 MG tablet  Commonly known as: LIPITOR   20 mg, Oral, Daily      lisinopril 30 MG tablet  Commonly known as: PRINIVIL,ZESTRIL   30 mg, Oral, Daily      traZODone 50 MG tablet  Commonly known as: DESYREL   50 mg, Oral, Nightly PRN               Ana Lilia Santillan MD  04/27/21  12:15 EDT    **Roel Disclaimer:   Much of this encounter note is an electronic transcription/translation of spoken language to printed text. The electronic translation of spoken language may permit erroneous, or at times, nonsensical words or phrases to be inadvertently transcribed. Although I have reviewed the note for such errors, some may still exist.

## 2021-04-30 ENCOUNTER — OFFICE VISIT (OUTPATIENT)
Dept: FAMILY MEDICINE CLINIC | Facility: CLINIC | Age: 56
End: 2021-04-30

## 2021-04-30 VITALS
DIASTOLIC BLOOD PRESSURE: 64 MMHG | HEIGHT: 66 IN | BODY MASS INDEX: 29.51 KG/M2 | RESPIRATION RATE: 16 BRPM | WEIGHT: 183.6 LBS | HEART RATE: 57 BPM | TEMPERATURE: 98.4 F | SYSTOLIC BLOOD PRESSURE: 112 MMHG | OXYGEN SATURATION: 95 %

## 2021-04-30 DIAGNOSIS — M25.511 ACUTE PAIN OF RIGHT SHOULDER: Primary | ICD-10-CM

## 2021-04-30 DIAGNOSIS — R21 RASH: ICD-10-CM

## 2021-04-30 PROCEDURE — 99213 OFFICE O/P EST LOW 20 MIN: CPT | Performed by: INTERNAL MEDICINE

## 2021-04-30 NOTE — PROGRESS NOTES
"Chief Complaint  Rash (pt states left rash on arm, has been there 3 -4 mon) and Shoulder Pain    Subjective          Julisa Nixon presents to [facility]  History of Present Illness  Has h/o chronic right shoulder pain which improved with steroid injection for months but it is starting back.  Has had MRI of shoulder in 7/2019 that showed fraying supraspinatus tendon and tendonitis and mild arthritis  Wants a second opinion as she saw Dr. Goldstein and Dr. Herrera but would like a shoulder ortho opinion.  Saw Dr. Santillan at [facility] this week due to palpitations and fatigue.  Neg evlaution.  Palpitations have stopped. She had rash 1/2021 and saw [facility]. Thought was c/w hives and suggested zyrtec.  Rash is on bilateral forearms for 3 months.  Not itching.  Sunlight may make it more red.  Not raised.    Objective   Vital Signs:   /64   Pulse 57   Temp 98.4 °F (36.9 °C) (Temporal)   Resp 16   Ht 167.6 cm (66\")   Wt 83.3 kg (183 lb 9.6 oz)   SpO2 95%   BMI 29.63 kg/m²     Physical Exam  Vitals and nursing note reviewed.   Constitutional:       Appearance: Normal appearance. She is well-developed.   HENT:      Head: Normocephalic and atraumatic.      Right Ear: External ear normal.      Left Ear: External ear normal.   Eyes:      Extraocular Movements: Extraocular movements intact.      Conjunctiva/sclera: Conjunctivae normal.   Neck:      Vascular: No carotid bruit.   Cardiovascular:      Rate and Rhythm: Normal rate and regular rhythm.      Heart sounds: Normal heart sounds.      Comments: No bruits  Pulmonary:      Effort: Pulmonary effort is normal. No respiratory distress.      Breath sounds: Normal breath sounds. No wheezing or rales.   Abdominal:      General: Bowel sounds are normal. There is no distension.      Palpations: Abdomen is soft. There is no mass.      Tenderness: There is no abdominal tenderness.   Musculoskeletal:      Cervical back: Neck supple.   Lymphadenopathy: "      Cervical: No cervical adenopathy.   Skin:     General: Skin is warm.      Comments: Confluent area on bilateral forearms that is mildly red with hypopigementation   Neurological:      General: No focal deficit present.      Mental Status: She is alert and oriented to person, place, and time.   Psychiatric:         Mood and Affect: Mood normal.         Behavior: Behavior normal.         Thought Content: Thought content normal.         Judgment: Judgment normal.        Result Review :                 Assessment and Plan    Diagnoses and all orders for this visit:    1. Acute pain of right shoulder (Primary)  -     Ambulatory Referral to Orthopedic Surgery    2. Rash  -     Ambulatory Referral to Dermatology        Follow Up   No follow-ups on file.  Patient was given instructions and counseling regarding her condition or for health maintenance advice. Please see specific information pulled into the AVS if appropriate.     Referral to Dr. JUAN Vickers for shoulder evaluation and treatment plan  Referral to derm for bx and further evaluation

## 2021-05-06 RX ORDER — TRAZODONE HYDROCHLORIDE 50 MG/1
50 TABLET ORAL NIGHTLY PRN
Qty: 90 TABLET | Refills: 1 | Status: SHIPPED | OUTPATIENT
Start: 2021-05-06 | End: 2021-05-18 | Stop reason: SDUPTHER

## 2021-05-06 NOTE — TELEPHONE ENCOUNTER
Pt states that she will be out of her medication on Saturday and that she will be going out of town. She was last seen 4/30/21

## 2021-05-17 RX ORDER — TRAZODONE HYDROCHLORIDE 50 MG/1
50 TABLET ORAL NIGHTLY PRN
Qty: 90 TABLET | Refills: 1 | Status: CANCELLED | OUTPATIENT
Start: 2021-05-17

## 2021-05-17 NOTE — TELEPHONE ENCOUNTER
Caller: Julisa Nixon    Relationship: Self    Best call back number: [phone]    Medication needed:   Requested Prescriptions     Pending Prescriptions Disp Refills   • atorvastatin (LIPITOR) 20 MG tablet 90 tablet 1     Sig: Take 1 tablet by mouth Daily.   • lisinopril (PRINIVIL,ZESTRIL) 30 MG tablet 90 tablet 1     Sig: Take 1 tablet by mouth Daily.   • traZODone (DESYREL) 50 MG tablet 90 tablet 1     Sig: Take 1 tablet by mouth At Night As Needed for Sleep.       When do you need the refill by: 05/24/2021    What additional details did the patient provide when requesting the medication:PATIENT HAS A ONE WEEK SUPPLY. PHARMACY NEVER RECEIVED REFILL FOR TRAZODONE. PATIENT REQUESTING CALLBACK AFTER PRESCRIPTIONS ARE SENT TO PHARMACY    Does the patient have less than a 3 day supply:  [] Yes  [x] No    What is the patient's preferred [facility] [place] [address] [address]  AT [facility] [phone] [facility] [phone]

## 2021-05-18 RX ORDER — ATORVASTATIN CALCIUM 20 MG/1
20 TABLET, FILM COATED ORAL DAILY
Qty: 90 TABLET | Refills: 1 | Status: SHIPPED | OUTPATIENT
Start: 2021-05-18 | End: 2022-02-22 | Stop reason: SDUPTHER

## 2021-05-18 RX ORDER — TRAZODONE HYDROCHLORIDE 50 MG/1
50 TABLET ORAL NIGHTLY PRN
Qty: 90 TABLET | Refills: 1 | Status: SHIPPED | OUTPATIENT
Start: 2021-05-18 | End: 2022-02-22 | Stop reason: SDUPTHER

## 2021-05-18 RX ORDER — LISINOPRIL 30 MG/1
30 TABLET ORAL DAILY
Qty: 90 TABLET | Refills: 1 | Status: SHIPPED | OUTPATIENT
Start: 2021-05-18 | End: 2022-02-22 | Stop reason: SDUPTHER

## 2021-08-10 ENCOUNTER — TELEPHONE (OUTPATIENT)
Dept: FAMILY MEDICINE CLINIC | Facility: CLINIC | Age: 56
End: 2021-08-10

## 2021-08-10 NOTE — TELEPHONE ENCOUNTER
Caller: Julisa Nixon    Relationship: Self    Best call back number: [phone]    What orders are you requesting (i.e. lab or imaging): DERMATOLOGY (2ND OPINION)    In what timeframe would the patient need to come in: AS SOON AS POSSIBLE    Where will you receive your lab/imaging services:   PATIENT HAS AN APPT MADE AT [facility] D[facility] BUT IT IS NOT UNTIL November.  1ST DERMATOLOGIST RECOMMENDED A BIOPSY AND PATIENT FELT UNCOMFORTABLE AFTER GOING TWICE TO THEM.          Additional notes:     PATIENT STATED THAT SHE ASKED APRN TO LOOK AT HER BACK AND DIDN'T EVEN GET CLOSE ENOUGH TO SEE WHAT PATIENT WAS REFERRING TO.  PATIENT THEN ATTEMPTED TO BACK UP CLOSER AND THE APRN REQUESTED THAT THE PATIENT NOT GET ANY CLOSER.    PATIENT CONTINUE TO HAVE RASH ON BOTH FOREARMS AND IS CONCERNED AS TO WHAT IT MAY BE.    PATIENT REQUESTED AN APPT SOONEST AVAILABLE WAS IN JAN 2022.

## 2021-08-10 NOTE — TELEPHONE ENCOUNTER
I like Dr. Priscilla Bowden in [facility] or Dr. Bronson Linda near my office.  Both are very good dermatologists

## 2022-01-24 ENCOUNTER — TELEPHONE (OUTPATIENT)
Dept: FAMILY MEDICINE CLINIC | Facility: CLINIC | Age: 57
End: 2022-01-24

## 2022-01-24 NOTE — TELEPHONE ENCOUNTER
Pt cannot keep appt tomorrow 1/25/22 for a routine f/u and would like to be worked in sometime next week or feb. Please advise.

## 2022-01-24 NOTE — TELEPHONE ENCOUNTER
Caller: Julisa Nixon    Relationship to patient: Self    Best call back number: [phone]  Type of visit: FOLLOW UP     Requested date: NEXT WEEK OR THE WEEK AFTER     If rescheduling, when is the original appointment: 01/25    Additional notes:FIRST AVAL IS 05/17 PATIENT WANTS SOONER

## 2022-02-02 ENCOUNTER — OFFICE VISIT (OUTPATIENT)
Dept: OBSTETRICS AND GYNECOLOGY | Facility: CLINIC | Age: 57
End: 2022-02-02

## 2022-02-02 VITALS
BODY MASS INDEX: 30.05 KG/M2 | HEIGHT: 66 IN | SYSTOLIC BLOOD PRESSURE: 132 MMHG | DIASTOLIC BLOOD PRESSURE: 82 MMHG | WEIGHT: 187 LBS

## 2022-02-02 DIAGNOSIS — Z12.31 ENCOUNTER FOR SCREENING MAMMOGRAM FOR MALIGNANT NEOPLASM OF BREAST: ICD-10-CM

## 2022-02-02 DIAGNOSIS — Z01.419 ROUTINE GYNECOLOGICAL EXAMINATION: ICD-10-CM

## 2022-02-02 DIAGNOSIS — Z12.11 SCREENING FOR COLON CANCER: ICD-10-CM

## 2022-02-02 DIAGNOSIS — Z01.419 PAP SMEAR, LOW-RISK: Primary | ICD-10-CM

## 2022-02-02 DIAGNOSIS — Z11.51 SPECIAL SCREENING EXAMINATION FOR HUMAN PAPILLOMAVIRUS (HPV): ICD-10-CM

## 2022-02-02 PROCEDURE — 81002 URINALYSIS NONAUTO W/O SCOPE: CPT | Performed by: OBSTETRICS & GYNECOLOGY

## 2022-02-02 PROCEDURE — 99396 PREV VISIT EST AGE 40-64: CPT | Performed by: OBSTETRICS & GYNECOLOGY

## 2022-02-02 NOTE — PROGRESS NOTES
GYN Annual Exam     CC- Here for annual exam.     Julisa Nixon is a 56 y.o. female established patient who presents for annual well woman exam. NO  VB.  She is now agreeable to colonoscopy.  She denies any issues with her bladder.    OB History        2    Para   2    Term   2            AB        Living   2       SAB        IAB        Ectopic        Molar        Multiple        Live Births              Obstetric Comments   2              Menarche:11  Menopause:44  HRT:none  Current contraception: status post hysterectomy- s/p TLH with OC for DUB age 44  History of abnormal Pap smear: no  History of abnormal mammogram: yes - B9 cyst bx  Family history of uterine, colon or ovarian cancer: yes - PGF and P aunt colon  Family history of breast cancer: no  STD's: none  Last pap smear:2021 nl x 2  VIOLETA: none      Health Maintenance   Topic Date Due   • COVID-19 Vaccine (1) Never done   • ZOSTER VACCINE (1 of 2) Never done   • HEPATITIS C SCREENING  Never done   • ANNUAL PHYSICAL  2018   • INFLUENZA VACCINE  2021   • LIPID PANEL  10/09/2021   • Annual Gynecologic Pelvic and Breast Exam  2023   • MAMMOGRAM  2023   • PAP SMEAR  2024   • COLORECTAL CANCER SCREENING  2024   • TDAP/TD VACCINES (2 - Td or Tdap) 2028   • Pneumococcal Vaccine 0-64  Aged Out       Past Medical History:   Diagnosis Date   • Hypertension    • Shoulder pain, right        Past Surgical History:   Procedure Laterality Date   • BREAST BIOPSY     •  SECTION     • COLONOSCOPY  2014    KALIE SILVESTRE MD   • HYSTERECTOMY      TLH with OC age 44 for DUB   • VAGINAL HYSTERECTOMY           Current Outpatient Medications:   •  aspirin 81 MG oral suspension, Take 81 mg by mouth Daily., Disp: , Rfl:   •  atorvastatin (LIPITOR) 20 MG tablet, Take 1 tablet by mouth Daily., Disp: 90 tablet, Rfl: 1  •  lisinopril (PRINIVIL,ZESTRIL) 30 MG tablet, Take 1 tablet by mouth Daily., Disp: 90  "tablet, Rfl: 1  •  traZODone (DESYREL) 50 MG tablet, Take 1 tablet by mouth At Night As Needed for Sleep., Disp: 90 tablet, Rfl: 1    No Known Allergies    Social History     Tobacco Use   • Smoking status: Never Smoker   • Smokeless tobacco: Never Used   Substance Use Topics   • Alcohol use: Yes     Comment: rare   • Drug use: No       Family History   Problem Relation Age of Onset   • Stroke Father    • Colon cancer Paternal Grandfather    • Colon cancer Paternal Aunt    • Breast cancer Neg Hx    • Ovarian cancer Neg Hx    • Deep vein thrombosis Neg Hx    • Pulmonary embolism Neg Hx        Review of Systems   Constitutional: Positive for activity change. Negative for appetite change, fatigue, fever and unexpected weight change.   Respiratory: Negative for cough and shortness of breath.    Cardiovascular: Negative for chest pain and palpitations.   Gastrointestinal: Negative for abdominal distention, abdominal pain, constipation, diarrhea and nausea.   Endocrine: Negative for cold intolerance and heat intolerance.   Genitourinary: Positive for pelvic pain. Negative for dyspareunia, dysuria, menstrual problem, vaginal bleeding and vaginal discharge.   Musculoskeletal: Positive for back pain. Negative for arthralgias, gait problem, neck pain and neck stiffness.   Skin: Negative for color change and rash.   Neurological: Negative for headaches.   Psychiatric/Behavioral: Negative for dysphoric mood. The patient is not nervous/anxious.        /82   Ht 167.6 cm (66\")   Wt 84.8 kg (187 lb)   Breastfeeding No   BMI 30.18 kg/m²     Physical Exam   Constitutional: She is oriented to person, place, and time. She appears well-developed.   HENT:   Head: Normocephalic and atraumatic.   Eyes: Conjunctivae are normal. No scleral icterus.   Neck: No thyromegaly present.   Cardiovascular: Normal rate and regular rhythm.   Pulmonary/Chest: Effort normal and breath sounds normal. Right breast exhibits no inverted nipple, no " mass, no nipple discharge, no skin change and no tenderness. Left breast exhibits no inverted nipple, no mass, no nipple discharge, no skin change and no tenderness.   Abdominal: Soft. Normal appearance and bowel sounds are normal. She exhibits no distension and no mass. There is no abdominal tenderness. There is no rebound and no guarding. No hernia.   Genitourinary:    Rectum normal.      Pelvic exam was performed with patient supine.   There is no rash, tenderness, lesion or injury on the right labia. There is no rash, tenderness, lesion or injury on the left labia. Right adnexum displays no mass, no tenderness and no fullness. Left adnexum displays no mass, no tenderness and no fullness.    No vaginal discharge, erythema, tenderness or bleeding.   No erythema, tenderness or bleeding in the vagina.    No foreign body in the vagina.      No signs of injury in the vagina.      Genitourinary Comments: Normal cuff-uterus and cervix absent       Neurological: She is alert and oriented to person, place, and time.   Skin: Skin is warm and dry.   Psychiatric: Her behavior is normal. Mood, judgment and thought content normal.   Nursing note and vitals reviewed.         Assessment/Plan    1) GYN HM: normal pap/HPV 1/2021SBE demonstrated and encouraged.  2) STD screening: declines Condoms encouraged.  3) Bone health - Weight bearing exercise, dietary calcium recommendations and vitamin D reviewed.   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   8) DEXA- UTD 2/2019- normal. Repeat in 5 years  9)C scope- due 9/2019, enc compliance.  Referred to [facility]  10) Parts of this document have been copied or forwarded from her previous visits and have been reviewed, updated and edited as indicated.   11) I saw the patient with a face mask, gloves and eye protection  The patient herself was masked.  Social distancing was observed as  appropriate.  12)Follow up prn and 1 year annual       Diagnoses and all orders for this visit:    1. Pap smear, low-risk (Primary)  -     POC Urinalysis Dipstick  -     Cancel: IgP, Aptima HPV    2. Routine gynecological examination  -     POC Urinalysis Dipstick  -     Cancel: IgP, Aptima HPV    3. Special screening examination for human papillomavirus (HPV)  -     POC Urinalysis Dipstick  -     Cancel: IgP, Aptima HPV    4. Screening for colon cancer  -     Ambulatory Referral For Screening Colonoscopy    5. Encounter for screening mammogram for malignant neoplasm of breast  -     Mammo Screening Bilateral With CAD; Future        Ariana Jossy Salcedo MD  2/2/2022  13:54 EST

## 2022-02-22 ENCOUNTER — OFFICE VISIT (OUTPATIENT)
Dept: FAMILY MEDICINE CLINIC | Facility: CLINIC | Age: 57
End: 2022-02-22

## 2022-02-22 VITALS
TEMPERATURE: 96.6 F | BODY MASS INDEX: 30.26 KG/M2 | HEART RATE: 94 BPM | WEIGHT: 188.3 LBS | SYSTOLIC BLOOD PRESSURE: 112 MMHG | RESPIRATION RATE: 16 BRPM | HEIGHT: 66 IN | OXYGEN SATURATION: 96 % | DIASTOLIC BLOOD PRESSURE: 64 MMHG

## 2022-02-22 DIAGNOSIS — E78.2 MIXED HYPERLIPIDEMIA: Primary | ICD-10-CM

## 2022-02-22 DIAGNOSIS — G47.00 INSOMNIA, UNSPECIFIED TYPE: ICD-10-CM

## 2022-02-22 DIAGNOSIS — I10 ESSENTIAL HYPERTENSION: ICD-10-CM

## 2022-02-22 DIAGNOSIS — R73.9 HYPERGLYCEMIA: ICD-10-CM

## 2022-02-22 PROCEDURE — 99214 OFFICE O/P EST MOD 30 MIN: CPT | Performed by: INTERNAL MEDICINE

## 2022-02-22 RX ORDER — TRAZODONE HYDROCHLORIDE 50 MG/1
50 TABLET ORAL NIGHTLY PRN
Qty: 90 TABLET | Refills: 1 | Status: SHIPPED | OUTPATIENT
Start: 2022-02-22 | End: 2022-09-21 | Stop reason: SDUPTHER

## 2022-02-22 RX ORDER — ATORVASTATIN CALCIUM 20 MG/1
20 TABLET, FILM COATED ORAL DAILY
Qty: 90 TABLET | Refills: 1 | Status: SHIPPED | OUTPATIENT
Start: 2022-02-22 | End: 2022-07-15

## 2022-02-22 RX ORDER — LISINOPRIL 30 MG/1
30 TABLET ORAL DAILY
Qty: 90 TABLET | Refills: 1 | Status: SHIPPED | OUTPATIENT
Start: 2022-02-22 | End: 2022-07-15

## 2022-02-22 NOTE — PROGRESS NOTES
"Chief Complaint  Follow-up (htn )    Subjective          Julisa Nixon presents to [facility]  History of Present Illness  Here for f/u on HTN and lisinopril and HL on lipitor.  Taking trazodone for insomnia.  She is due for fasting labs.   Her cousin was killed in MVA in [facility] recently.  She is grieving.  She is seeing gyn and pap is every other year and MMG 5/2022.  CN is also due and has been ordered by gyn and she has paper work to complete.   Objective   Vital Signs:   /64   Pulse 94   Temp 96.6 °F (35.9 °C) (Temporal)   Resp 16   Ht 167.6 cm (66\")   Wt 85.4 kg (188 lb 4.8 oz)   SpO2 96%   BMI 30.39 kg/m²     Physical Exam  Vitals and nursing note reviewed.   Constitutional:       Appearance: Normal appearance. She is well-developed.   HENT:      Head: Normocephalic and atraumatic.      Right Ear: External ear normal.      Left Ear: External ear normal.   Eyes:      Extraocular Movements: Extraocular movements intact.      Conjunctiva/sclera: Conjunctivae normal.   Neck:      Vascular: No carotid bruit.   Cardiovascular:      Rate and Rhythm: Normal rate and regular rhythm.      Heart sounds: Normal heart sounds.      Comments: No bruits  Pulmonary:      Effort: Pulmonary effort is normal. No respiratory distress.      Breath sounds: Normal breath sounds. No wheezing or rales.   Abdominal:      General: Bowel sounds are normal. There is no distension.      Palpations: Abdomen is soft. There is no mass.      Tenderness: There is no abdominal tenderness.   Musculoskeletal:      Cervical back: Neck supple.   Lymphadenopathy:      Cervical: No cervical adenopathy.   Skin:     General: Skin is warm.   Neurological:      General: No focal deficit present.      Mental Status: She is alert and oriented to person, place, and time.   Psychiatric:         Mood and Affect: Mood normal.         Behavior: Behavior normal.         Thought Content: Thought content normal.  "        Judgment: Judgment normal.        Result Review :                 Assessment and Plan    Diagnoses and all orders for this visit:    1. Mixed hyperlipidemia (Primary)  -     Comprehensive Metabolic Panel  -     Lipid Panel With LDL / HDL Ratio  -     TSH    2. Essential hypertension  -     Comprehensive Metabolic Panel  -     TSH    3. Hyperglycemia  -     Hemoglobin A1c    4. Insomnia, unspecified type    Other orders  -     atorvastatin (LIPITOR) 20 MG tablet; Take 1 tablet by mouth Daily.  Dispense: 90 tablet; Refill: 1  -     lisinopril (PRINIVIL,ZESTRIL) 30 MG tablet; Take 1 tablet by mouth Daily.  Dispense: 90 tablet; Refill: 1  -     traZODone (DESYREL) 50 MG tablet; Take 1 tablet by mouth At Night As Needed for Sleep.  Dispense: 90 tablet; Refill: 1        Follow Up   Return in about 6 months (around 8/22/2022).  Patient was given instructions and counseling regarding her condition or for health maintenance advice. Please see specific information pulled into the AVS if appropriate.     Patient will schedule colonoscopy.  She has the paperwork at home.  Refills provided.  She will continue the Lipitor 20 mg daily.  Check fasting labs today.  Her blood pressure is well controlled on lisinopril 30 mg daily which has been refilled.  Check kidney function.  She will continue to take the trazodone as needed for insomnia 50 mg.  A1c will be checked today due to history of borderline or prediabetes.  RTC 6 -8 months for CPE

## 2022-02-23 ENCOUNTER — TELEPHONE (OUTPATIENT)
Dept: FAMILY MEDICINE CLINIC | Facility: CLINIC | Age: 57
End: 2022-02-23

## 2022-02-23 DIAGNOSIS — R73.09 ELEVATED GLUCOSE: Primary | ICD-10-CM

## 2022-02-23 LAB
ALBUMIN SERPL-MCNC: 4.4 G/DL (ref 3.8–4.9)
ALBUMIN/GLOB SERPL: 1.7 {RATIO} (ref 1.2–2.2)
ALP SERPL-CCNC: 75 IU/L (ref 44–121)
ALT SERPL-CCNC: 34 IU/L (ref 0–32)
AST SERPL-CCNC: 23 IU/L (ref 0–40)
BILIRUB SERPL-MCNC: 0.3 MG/DL (ref 0–1.2)
BUN SERPL-MCNC: 15 MG/DL (ref 6–24)
BUN/CREAT SERPL: 18 (ref 9–23)
CALCIUM SERPL-MCNC: 9.1 MG/DL (ref 8.7–10.2)
CHLORIDE SERPL-SCNC: 100 MMOL/L (ref 96–106)
CHOLEST SERPL-MCNC: 150 MG/DL (ref 100–199)
CO2 SERPL-SCNC: 24 MMOL/L (ref 20–29)
CREAT SERPL-MCNC: 0.84 MG/DL (ref 0.57–1)
GLOBULIN SER CALC-MCNC: 2.6 G/DL (ref 1.5–4.5)
GLUCOSE SERPL-MCNC: 113 MG/DL (ref 65–99)
HBA1C MFR BLD: 6.2 % (ref 4.8–5.6)
HDLC SERPL-MCNC: 53 MG/DL
LDLC SERPL CALC-MCNC: 77 MG/DL (ref 0–99)
LDLC/HDLC SERPL: 1.5 RATIO (ref 0–3.2)
POTASSIUM SERPL-SCNC: 4.4 MMOL/L (ref 3.5–5.2)
PROT SERPL-MCNC: 7 G/DL (ref 6–8.5)
SODIUM SERPL-SCNC: 139 MMOL/L (ref 134–144)
TRIGL SERPL-MCNC: 114 MG/DL (ref 0–149)
TSH SERPL DL<=0.005 MIU/L-ACNC: 2.28 UIU/ML (ref 0.45–4.5)
VLDLC SERPL CALC-MCNC: 20 MG/DL (ref 5–40)

## 2022-02-23 RX ORDER — LISINOPRIL 30 MG/1
TABLET ORAL
Qty: 90 TABLET | Refills: 1 | OUTPATIENT
Start: 2022-02-23

## 2022-02-23 RX ORDER — ATORVASTATIN CALCIUM 20 MG/1
TABLET, FILM COATED ORAL
Qty: 90 TABLET | Refills: 1 | OUTPATIENT
Start: 2022-02-23

## 2022-02-23 NOTE — TELEPHONE ENCOUNTER
Pt is aware medication has been sent over to pharmacy as of yesterday. Pt states was eating unhealthy, a lot of sweets would like to repeat blood work.     Pt would like to see a link a healthy diabetic diet.

## 2022-02-23 NOTE — TELEPHONE ENCOUNTER
Rx Refill Note  Requested Prescriptions     Pending Prescriptions Disp Refills   • atorvastatin (LIPITOR) 20 MG tablet [Pharmacy Med Name: ATORVASTATIN 20 MG TABLET] 90 tablet 1     Sig: TAKE ONE TABLET BY MOUTH DAILY   • lisinopril (PRINIVIL,ZESTRIL) 30 MG tablet [Pharmacy Med Name: LISINOPRIL 30 MG TABLET] 90 tablet 1     Sig: TAKE ONE TABLET BY MOUTH DAILY      Last office visit with prescribing clinician: 2/22/2022      Next office visit with prescribing clinician: 2/23/2022       {TIP  Please add Last Relevant Lab Date if appropriate:   {TIP  Is Refill Pharmacy correct?:23}  Jaxon Patiño MA  02/23/22, 12:44 EST

## 2022-02-23 NOTE — TELEPHONE ENCOUNTER
Caller: Julisa Nixon    Relationship: Self    Best call back number: [phone]*    What is the best time to reach you: ANYTIME    Who are you requesting to speak with (clinical staff, provider,  specific staff member): CLINICAL    What was the call regarding: PATIENT REQUESTING A CALL BACK TO DISCUSS NEW MEDICATION THAT THE PATIENT HAS BEEN PRESCRIBED. THE PATIENT IS WANTING TO DISCUSS FURTHER. PATIENT STATES SHE IS WORRIED ABOUT GOING ON A NEW MEDICATION AND IS WILLING TO CHANGE LIFE STYLE, UNLESS THE NEW MEDICATION IS NECESSARY.  PATIENT ALSO STATED THAT SHE IS NEEDING A REFILL OF LISINOPRIL, BUT NEEDS TO KNOW IF THIS MEDICATION IS BEING CHANGED AS WELL.    Do you require a callback: YES

## 2022-02-23 NOTE — TELEPHONE ENCOUNTER
Meds were sent yesterday.  Will hold off on metformin.  I recommend that she start weight watchers and work on losing weight and start exercising 4-5 times/week.  Recheck a1c, BMP in 8 weeks

## 2022-03-21 ENCOUNTER — HOSPITAL ENCOUNTER (OUTPATIENT)
Dept: MAMMOGRAPHY | Facility: HOSPITAL | Age: 57
Discharge: HOME | End: 2022-03-21
Admitting: OBSTETRICS & GYNECOLOGY

## 2022-03-21 DIAGNOSIS — Z12.31 ENCOUNTER FOR SCREENING MAMMOGRAM FOR MALIGNANT NEOPLASM OF BREAST: ICD-10-CM

## 2022-03-21 PROCEDURE — 77063 BREAST TOMOSYNTHESIS BI: CPT

## 2022-03-21 PROCEDURE — 77067 SCR MAMMO BI INCL CAD: CPT

## 2022-03-25 ENCOUNTER — TELEPHONE (OUTPATIENT)
Dept: GASTROENTEROLOGY | Facility: CLINIC | Age: 57
End: 2022-03-25

## 2022-03-25 ENCOUNTER — PREP FOR SURGERY (OUTPATIENT)
Dept: SURGERY | Facility: SURGERY CENTER | Age: 57
End: 2022-03-25

## 2022-03-25 DIAGNOSIS — Z86.010 PERSONAL HISTORY OF COLONIC POLYPS: Primary | ICD-10-CM

## 2022-03-28 NOTE — TELEPHONE ENCOUNTER
Pt has been called once she called the office and advised pharmacy still had not reached out to her and when reaching out to them continued to tell her they did not have her prescriptions. Once calling pharmacy, I confirmed they had received it and they are refilling it for pt now. Pt is aware.

## 2022-03-29 PROBLEM — Z86.0100 PERSONAL HISTORY OF COLONIC POLYPS: Status: ACTIVE | Noted: 2022-03-29

## 2022-03-29 PROBLEM — Z86.010 PERSONAL HISTORY OF COLONIC POLYPS: Status: ACTIVE | Noted: 2022-03-29

## 2022-04-20 ENCOUNTER — OFFICE VISIT (OUTPATIENT)
Dept: FAMILY MEDICINE CLINIC | Facility: CLINIC | Age: 57
End: 2022-04-20

## 2022-04-20 VITALS
DIASTOLIC BLOOD PRESSURE: 84 MMHG | SYSTOLIC BLOOD PRESSURE: 126 MMHG | WEIGHT: 178.5 LBS | BODY MASS INDEX: 28.69 KG/M2 | HEIGHT: 66 IN | OXYGEN SATURATION: 96 % | HEART RATE: 88 BPM | RESPIRATION RATE: 16 BRPM | TEMPERATURE: 96.9 F

## 2022-04-20 DIAGNOSIS — M24.9 DERANGEMENT OF RIGHT SI JOINT: ICD-10-CM

## 2022-04-20 DIAGNOSIS — R73.9 HYPERGLYCEMIA: Primary | ICD-10-CM

## 2022-04-20 PROCEDURE — 99214 OFFICE O/P EST MOD 30 MIN: CPT | Performed by: INTERNAL MEDICINE

## 2022-04-20 NOTE — PROGRESS NOTES
"Chief Complaint  Other (Pt complains of buttocks pain possible sciatica pain ), Bunions (Pt complains of bunions on both left and rt finger ), and Groin Pain    Subjective          Julisa Nixon presents to [facility]  History of Present Illness  Has two complains:  Her fingers and her leg/groin.  Saw Dr. Gildardo HERRING and had neg MRI tailbone, back and SI joint a few years ago.  After a walk with her  recently for a prolonged walk, her right SI joint area get stiff and painful to lift her leg and with rest it improves for a few days until she does too much or walks too far and it will restart again.  Has lost 10 pounds since February due to labs that showed hyperglycemia.    Objective   Vital Signs:   /84   Pulse 88   Temp 96.9 °F (36.1 °C) (Temporal)   Resp 16   Ht 167.6 cm (66\")   Wt 81 kg (178 lb 8 oz)   SpO2 96%   BMI 28.81 kg/m²     BMI is above normal parameters. Recommendations: exercise counseling/recommendations and nutrition counseling/recommendations       Physical Exam  Vitals and nursing note reviewed.   Constitutional:       Appearance: Normal appearance.   HENT:      Head: Normocephalic and atraumatic.   Eyes:      Extraocular Movements: Extraocular movements intact.   Pulmonary:      Effort: Pulmonary effort is normal.   Abdominal:      Palpations: Abdomen is soft.   Musculoskeletal:         General: Tenderness (over right SI joint) present. No swelling or deformity.      Right lower leg: No edema.      Left lower leg: No edema.      Comments: Negative straight leg raise test on the right   Neurological:      General: No focal deficit present.      Mental Status: She is alert and oriented to person, place, and time.   Psychiatric:         Mood and Affect: Mood normal.         Behavior: Behavior normal.         Thought Content: Thought content normal.         Judgment: Judgment normal.        Result Review :                 Assessment and Plan  "   Diagnoses and all orders for this visit:    1. Hyperglycemia (Primary)  -     Basic Metabolic Panel  -     Hemoglobin A1c    2. Derangement of right SI joint  -     Ambulatory Referral to Orthopedic Surgery        Follow Up   No follow-ups on file.  Patient was given instructions and counseling regarding her condition or for health maintenance advice. Please see specific information pulled into the AVS if appropriate.     She is due for fasting labs to recheck her sugar and A1c.  She is fasting today so we will do those labs today.  I have sent her to [place] [facility], Dr. Morse for what appears to be inflammation of the right SI joint.  I think she is having some radiation of pain into her right groin.  Is possible that she has hip arthritis.  Her hands show arthritic changes.  She does have some nodules over the distal interphalangeal joints on both hands on the fingers.  There is no pain and no discomfort and full range of motion.  We will observe this for now.  In the meantime she will ice her right SI joint and she will take ibuprofen for 4 to 5 days.  She is already so much better than she was a few days ago when she made the appointment.  She has no stiffness and she is moving quite well today in the office.

## 2022-04-21 LAB
BUN SERPL-MCNC: 15 MG/DL (ref 6–24)
BUN/CREAT SERPL: 19 (ref 9–23)
CALCIUM SERPL-MCNC: 9.3 MG/DL (ref 8.7–10.2)
CHLORIDE SERPL-SCNC: 104 MMOL/L (ref 96–106)
CO2 SERPL-SCNC: 24 MMOL/L (ref 20–29)
CREAT SERPL-MCNC: 0.79 MG/DL (ref 0.57–1)
EGFRCR SERPLBLD CKD-EPI 2021: 87 ML/MIN/1.73
GLUCOSE SERPL-MCNC: 108 MG/DL (ref 65–99)
HBA1C MFR BLD: 6 % (ref 4.8–5.6)
POTASSIUM SERPL-SCNC: 4.2 MMOL/L (ref 3.5–5.2)
SODIUM SERPL-SCNC: 141 MMOL/L (ref 134–144)

## 2022-05-26 ENCOUNTER — OFFICE VISIT (OUTPATIENT)
Dept: ORTHOPEDIC SURGERY | Facility: CLINIC | Age: 57
End: 2022-05-26

## 2022-05-26 VITALS — WEIGHT: 179 LBS | BODY MASS INDEX: 28.77 KG/M2 | TEMPERATURE: 96 F | HEIGHT: 66 IN

## 2022-05-26 DIAGNOSIS — R52 PAIN: Primary | ICD-10-CM

## 2022-05-26 PROCEDURE — 73502 X-RAY EXAM HIP UNI 2-3 VIEWS: CPT | Performed by: ORTHOPAEDIC SURGERY

## 2022-05-26 PROCEDURE — 99214 OFFICE O/P EST MOD 30 MIN: CPT | Performed by: ORTHOPAEDIC SURGERY

## 2022-05-26 RX ORDER — DIAZEPAM 5 MG/1
TABLET ORAL
Qty: 1 TABLET | Refills: 0 | Status: SHIPPED | OUTPATIENT
Start: 2022-05-26 | End: 2022-08-18

## 2022-05-26 NOTE — PROGRESS NOTES
"Patient: Julisa Nixon  YOB: 1965 57 y.o. female  Medical Record Number: [id number]    Chief Complaint:   Chief Complaint   Patient presents with   • Right Hip - Pain     New problem       History of Present Illness:Julisa Nixon is a 57 y.o. female who presents for follow-up of right hip pain over the last few years she has had intermittent episodes of fairly severe pain followed by episodes of near normalcy.  She relates the pain to a ride where she was maggie while at [employer].  She has seen neurosurgeons and other orthopedic surgeons and its recommended that she try anti-inflammatories but she has persistent discomfort despite conservative measures.    Allergies: No Known Allergies    Medications:   Current Outpatient Medications   Medication Sig Dispense Refill   • aspirin 81 MG oral suspension Take 81 mg by mouth Daily.     • atorvastatin (LIPITOR) 20 MG tablet Take 1 tablet by mouth Daily. 90 tablet 1   • lisinopril (PRINIVIL,ZESTRIL) 30 MG tablet Take 1 tablet by mouth Daily. 90 tablet 1   • traZODone (DESYREL) 50 MG tablet Take 1 tablet by mouth At Night As Needed for Sleep. 90 tablet 1     No current facility-administered medications for this visit.         The following portions of the patient's history were reviewed and updated as appropriate: allergies, current medications, past family history, past medical history, past social history, past surgical history and problem list.    Review of Systems:   A 14 point review of systems was performed. All systems negative except pertinent positives/negative listed in HPI above    Physical Exam:   Vitals:    05/26/22 1012   Temp: 96 °F (35.6 °C)   Weight: 81.2 kg (179 lb)   Height: 167.6 cm (66\")       General: A and O x 3, ASA, NAD    SCLERA:    Normal    DENTITION:   Normal  Hip:  right    LEG ALIGNMENT:     Neutral   ,    equal leg lengths    GAIT:     Nonantalgic    SKIN:     No abnormality    RANGE OF MOTION:      Full without joint " irritability    STRENGTH:     5 / 5    hip flexion and abduction    DISTAL PULSES:    Paplable    DISTAL SENSATION :   Intact    LYMPHATICS:     No   lymphadenopathy    OTHER:          - Negative Stinchfeld test      - Negative log roll      - No Tenderness to palpation trochanteric bursa      - Neg FADIR      - Neg RANDY      - No SI tenderness       Radiology:    Xrays 2views right hip (AP bilateral hips and lateral hip) were ordered and reviewed for evaluation of hip pain demonstrating minimal arthritic findings with perhaps some very minimal marginal osteophyte formation.  There is no significant joint space narrowing.  Comparison views: todays xrays were compared to previous xrays and demonstrate no change    Assessment/Plan:  Right intermittent hip pain she has had a previous MRI which shows some mild arthritis but no other obvious abnormality to suggest her severe intermittent hip pain.  At times she has trouble bearing weight she will have a sharp stabbing pain within the groin also in the sacroiliac region.  Dr. Ewing her neurosurgeon has done extensive work-up including lumbar MRI sacroiliac MRI and been unable to attribute it to any lumbar pathology or sacroiliac pathology.  I am going to get an MRI arthrogram of the hip and we will call her back with results      Rodger Patel MD  5/26/2022

## 2022-06-02 ENCOUNTER — TELEPHONE (OUTPATIENT)
Dept: ORTHOPEDIC SURGERY | Facility: CLINIC | Age: 57
End: 2022-06-02

## 2022-07-05 ENCOUNTER — TELEPHONE (OUTPATIENT)
Dept: ORTHOPEDIC SURGERY | Facility: CLINIC | Age: 57
End: 2022-07-05

## 2022-07-05 NOTE — TELEPHONE ENCOUNTER
Provider: DR ESTRADA    Caller: GWENDOLYN CHAPMAN    Relationship to Patient: SELF    Phone Number: [phone]    Reason for Call: PT WOULD LIKE TO DISCUSS HER MRI RESULTS OF RIGHT HIP, AND WHAT LIKE TO KNOW WHAT NEXT STEPS ARE.

## 2022-07-15 RX ORDER — ATORVASTATIN CALCIUM 20 MG/1
TABLET, FILM COATED ORAL
Qty: 90 TABLET | Refills: 1 | Status: SHIPPED | OUTPATIENT
Start: 2022-07-15 | End: 2022-09-21 | Stop reason: SDUPTHER

## 2022-07-15 RX ORDER — LISINOPRIL 30 MG/1
TABLET ORAL
Qty: 90 TABLET | Refills: 1 | Status: SHIPPED | OUTPATIENT
Start: 2022-07-15 | End: 2022-09-21 | Stop reason: SDUPTHER

## 2022-07-15 NOTE — TELEPHONE ENCOUNTER
Rx Refill Note  Requested Prescriptions     Pending Prescriptions Disp Refills   • atorvastatin (LIPITOR) 20 MG tablet [Pharmacy Med Name: ATORVASTATIN 20 MG TABLET] 90 tablet 1     Sig: TAKE ONE TABLET BY MOUTH DAILY   • lisinopril (PRINIVIL,ZESTRIL) 30 MG tablet [Pharmacy Med Name: LISINOPRIL 30 MG TABLET] 90 tablet 1     Sig: TAKE ONE TABLET BY MOUTH DAILY      Last office visit with prescribing clinician: 4/20/2022      Next office visit with prescribing clinician: 9/21/2022       {TIP  Please add Last Relevant Lab Date if appropriate: 4/20/22    Jaxon Patiño MA  07/15/22, 08:44 EDT

## 2022-07-28 DIAGNOSIS — R73.9 HYPERGLYCEMIA: ICD-10-CM

## 2022-07-29 LAB
ALBUMIN SERPL-MCNC: 4.7 G/DL (ref 3.8–4.9)
ALBUMIN/GLOB SERPL: 1.5 {RATIO} (ref 1.2–2.2)
ALP SERPL-CCNC: 71 IU/L (ref 44–121)
ALT SERPL-CCNC: 20 IU/L (ref 0–32)
AST SERPL-CCNC: 17 IU/L (ref 0–40)
BILIRUB SERPL-MCNC: 0.5 MG/DL (ref 0–1.2)
BUN SERPL-MCNC: 14 MG/DL (ref 6–24)
BUN/CREAT SERPL: 16 (ref 9–23)
CALCIUM SERPL-MCNC: 9.4 MG/DL (ref 8.7–10.2)
CHLORIDE SERPL-SCNC: 99 MMOL/L (ref 96–106)
CO2 SERPL-SCNC: 24 MMOL/L (ref 20–29)
CREAT SERPL-MCNC: 0.87 MG/DL (ref 0.57–1)
EGFRCR SERPLBLD CKD-EPI 2021: 77 ML/MIN/1.73
GLOBULIN SER CALC-MCNC: 3.2 G/DL (ref 1.5–4.5)
GLUCOSE SERPL-MCNC: 87 MG/DL (ref 65–99)
HBA1C MFR BLD: 5.9 % (ref 4.8–5.6)
POTASSIUM SERPL-SCNC: 4.3 MMOL/L (ref 3.5–5.2)
PROT SERPL-MCNC: 7.9 G/DL (ref 6–8.5)
SODIUM SERPL-SCNC: 138 MMOL/L (ref 134–144)

## 2022-08-11 ENCOUNTER — PREP FOR SURGERY (OUTPATIENT)
Dept: SURGERY | Facility: SURGERY CENTER | Age: 57
End: 2022-08-11

## 2022-08-11 DIAGNOSIS — Z86.010 PERSONAL HISTORY OF COLONIC POLYPS: Primary | ICD-10-CM

## 2022-08-18 NOTE — SIGNIFICANT NOTE
Education provided the Patient on the following:    - Nothing to Eat or Drink after MN the night before the procedure    - Avoid red/purple fluids while completing their bowel prep as ordered by physician  -Contact Gastrointerologist office for any questions about specific details regarding colon prep    -You will need to have someone drive you home after your colonoscopy and remain with you for 24 hours after the procedure  - The date of your Surgery, you may have one visitor at bedside or within 10-15 minutes of [facility] [place]  -Please wear warm socks when you arrive for your colonoscopy  -Remove all jewelry and leave any valuables before arriving the day of your procedure (all will have to be removed before leaving preop)  -You will need to arrive at 1215 on 8/23 for your colonoscopy    -Feel free to contact us at: [phone] with any additional questions/concerns

## 2022-08-23 ENCOUNTER — HOSPITAL ENCOUNTER (OUTPATIENT)
Facility: SURGERY CENTER | Age: 57
Setting detail: HOSPITAL OUTPATIENT SURGERY
Discharge: HOME | End: 2022-08-23
Attending: INTERNAL MEDICINE | Admitting: INTERNAL MEDICINE

## 2022-08-23 ENCOUNTER — ANESTHESIA (OUTPATIENT)
Dept: SURGERY | Facility: SURGERY CENTER | Age: 57
End: 2022-08-23

## 2022-08-23 ENCOUNTER — ANESTHESIA EVENT (OUTPATIENT)
Dept: SURGERY | Facility: SURGERY CENTER | Age: 57
End: 2022-08-23

## 2022-08-23 VITALS
RESPIRATION RATE: 18 BRPM | TEMPERATURE: 97.8 F | SYSTOLIC BLOOD PRESSURE: 140 MMHG | OXYGEN SATURATION: 95 % | HEIGHT: 66 IN | DIASTOLIC BLOOD PRESSURE: 80 MMHG | WEIGHT: 170 LBS | BODY MASS INDEX: 27.32 KG/M2 | HEART RATE: 90 BPM

## 2022-08-23 DIAGNOSIS — Z86.010 PERSONAL HISTORY OF COLONIC POLYPS: ICD-10-CM

## 2022-08-23 PROCEDURE — 88305 TISSUE EXAM BY PATHOLOGIST: CPT | Performed by: INTERNAL MEDICINE

## 2022-08-23 PROCEDURE — 25010000002 PROPOFOL 10 MG/ML EMULSION: Performed by: ANESTHESIOLOGY

## 2022-08-23 PROCEDURE — 45380 COLONOSCOPY AND BIOPSY: CPT | Performed by: INTERNAL MEDICINE

## 2022-08-23 RX ORDER — LIDOCAINE HYDROCHLORIDE 10 MG/ML
0.5 INJECTION, SOLUTION INFILTRATION; PERINEURAL ONCE AS NEEDED
Status: DISCONTINUED | OUTPATIENT
Start: 2022-08-23 | End: 2022-08-23 | Stop reason: HOSPADM

## 2022-08-23 RX ORDER — SODIUM CHLORIDE, SODIUM LACTATE, POTASSIUM CHLORIDE, CALCIUM CHLORIDE 600; 310; 30; 20 MG/100ML; MG/100ML; MG/100ML; MG/100ML
1000 INJECTION, SOLUTION INTRAVENOUS CONTINUOUS
Status: DISCONTINUED | OUTPATIENT
Start: 2022-08-23 | End: 2022-08-23 | Stop reason: HOSPADM

## 2022-08-23 RX ORDER — PROMETHAZINE HYDROCHLORIDE 25 MG/1
25 SUPPOSITORY RECTAL ONCE AS NEEDED
Status: DISCONTINUED | OUTPATIENT
Start: 2022-08-23 | End: 2022-08-23 | Stop reason: HOSPADM

## 2022-08-23 RX ORDER — PROMETHAZINE HYDROCHLORIDE 12.5 MG/1
25 TABLET ORAL ONCE AS NEEDED
Status: DISCONTINUED | OUTPATIENT
Start: 2022-08-23 | End: 2022-08-23 | Stop reason: HOSPADM

## 2022-08-23 RX ORDER — LIDOCAINE HYDROCHLORIDE 20 MG/ML
INJECTION, SOLUTION INFILTRATION; PERINEURAL AS NEEDED
Status: DISCONTINUED | OUTPATIENT
Start: 2022-08-23 | End: 2022-08-23 | Stop reason: SURG

## 2022-08-23 RX ORDER — PROPOFOL 10 MG/ML
VIAL (ML) INTRAVENOUS AS NEEDED
Status: DISCONTINUED | OUTPATIENT
Start: 2022-08-23 | End: 2022-08-23 | Stop reason: SURG

## 2022-08-23 RX ORDER — SODIUM CHLORIDE 0.9 % (FLUSH) 0.9 %
10 SYRINGE (ML) INJECTION AS NEEDED
Status: DISCONTINUED | OUTPATIENT
Start: 2022-08-23 | End: 2022-08-23 | Stop reason: HOSPADM

## 2022-08-23 RX ADMIN — PROPOFOL 50 MG: 10 INJECTION, EMULSION INTRAVENOUS at 14:14

## 2022-08-23 RX ADMIN — LIDOCAINE HYDROCHLORIDE 60 MG: 20 INJECTION, SOLUTION INFILTRATION; PERINEURAL at 14:05

## 2022-08-23 RX ADMIN — SODIUM CHLORIDE, POTASSIUM CHLORIDE, SODIUM LACTATE AND CALCIUM CHLORIDE 1000 ML: 600; 310; 30; 20 INJECTION, SOLUTION INTRAVENOUS at 12:35

## 2022-08-23 RX ADMIN — PROPOFOL 90 MG: 10 INJECTION, EMULSION INTRAVENOUS at 14:06

## 2022-08-23 RX ADMIN — PROPOFOL 140 MCG/KG/MIN: 10 INJECTION, EMULSION INTRAVENOUS at 14:06

## 2022-08-23 NOTE — BRIEF OP NOTE
COLONOSCOPY  Progress Note    Julisa Nixon  8/23/2022    Pre-op Diagnosis:   Personal history of colonic polyps [Z86.010]       Post-Op Diagnosis Codes:     * Personal history of colonic polyps [Z86.010]     * Colon polyp [K63.5]    Procedure/CPT® Codes:        Procedure(s):  COLONOSCOPY with Polypectomy    Surgeon(s):  Alex Guerrero MD    Anesthesia: Monitored Anesthesia Care    Staff:   Endo Technician: Bronson Choi  Endo Nurse: Priscilla Parrish RN         Estimated Blood Loss: none    Urine Voided: * No values recorded between 8/23/2022  2:01 PM and 8/23/2022  2:34 PM *    Specimens:                Specimens     ID Source Type Tests Collected By Collected At Frozen?    A Large Intestine, Sigmoid Colon Tissue · TISSUE PATHOLOGY EXAM   Alex Guerrero MD 8/23/22 1415 No    Description: sigmoid polyp    This specimen was not marked as sent.    B Large Intestine, Rectum Tissue · TISSUE PATHOLOGY EXAM   Alex Guerrero MD 8/23/22 1430 No    Description: rectal polyp x2    This specimen was not marked as sent.                Drains: * No LDAs found *    Findings: Colon to TI Good Prep  Polyps-3-Biopsy        Complications: None          Alex Guerrero MD     Date: 8/23/2022  Time: 14:37 EDT

## 2022-08-23 NOTE — ANESTHESIA POSTPROCEDURE EVALUATION
"Patient: Julisa Nixon    Procedure Summary     Date: 08/23/22 Room / Location: [facility]    Anesthesia Start: 1401 Anesthesia Stop: 1433    Procedure: COLONOSCOPY with Polypectomy (N/A ) Diagnosis:       Personal history of colonic polyps      Colon polyp      (Personal history of colonic polyps [Z86.010])    Surgeons: Alex Guerrero MD Provider: Cricket Sawant MD    Anesthesia Type: MAC ASA Status: 2          Anesthesia Type: MAC    Vitals  Vitals Value Taken Time   /80 08/23/22 1454   Temp 36.6 °C (97.8 °F) 08/23/22 1435   Pulse 90 08/23/22 1454   Resp 18 08/23/22 1454   SpO2 95 % 08/23/22 1454           Post Anesthesia Care and Evaluation    Patient location during evaluation: bedside  Patient participation: complete - patient participated  Level of consciousness: awake and alert  Pain management: adequate    Airway patency: patent  Anesthetic complications: No anesthetic complications  PONV Status: none  Cardiovascular status: acceptable  Respiratory status: acceptable  Hydration status: acceptable    Comments: /80   Pulse 90   Temp 36.6 °C (97.8 °F)   Resp 18   Ht 167.6 cm (66\")   Wt 77.1 kg (170 lb)   SpO2 95%   BMI 27.44 kg/m²         "

## 2022-08-23 NOTE — H&P
Patient Care Team:  Lupe Stewart MD as PCP - General  Lupe Stewart MD as PCP - Family Medicine    CHIEF COMPLAINT: Screening CRC and Family hx of CRC or polyps    HISTORY OF PRESENT ILLNESS:  Last exam was     Past Medical History:   Diagnosis Date   • Hypertension    • PONV (postoperative nausea and vomiting)    • Rheumatic fever     and    • Shoulder pain, right      Past Surgical History:   Procedure Laterality Date   • BREAST BIOPSY     •  SECTION     • COLONOSCOPY  2014    KALIE SILVESTRE MD   • HYSTERECTOMY      TLH with OC age 44 for DUB   • VAGINAL HYSTERECTOMY       Family History   Problem Relation Age of Onset   • Stroke Father    • Colon cancer Paternal Grandfather    • Colon cancer Paternal Aunt    • Breast cancer Neg Hx    • Ovarian cancer Neg Hx    • Deep vein thrombosis Neg Hx    • Pulmonary embolism Neg Hx      Social History     Tobacco Use   • Smoking status: Never Smoker   • Smokeless tobacco: Never Used   Substance Use Topics   • Alcohol use: Yes     Comment: rare   • Drug use: No     Medications Prior to Admission   Medication Sig Dispense Refill Last Dose   • lisinopril (PRINIVIL,ZESTRIL) 30 MG tablet TAKE ONE TABLET BY MOUTH DAILY 90 tablet 1 2022 at Unknown time   • aspirin 81 MG oral suspension Take 81 mg by mouth Daily.   2022   • atorvastatin (LIPITOR) 20 MG tablet TAKE ONE TABLET BY MOUTH DAILY 90 tablet 1 2022   • traZODone (DESYREL) 50 MG tablet Take 1 tablet by mouth At Night As Needed for Sleep. 90 tablet 1 2022     Allergies:  Patient has no known allergies.    REVIEW OF SYSTEMS:  Please see the above history of present illness for pertinent positives and negatives.  The remainder of the patient's systems have been reviewed and are negative.     Vital Signs  Temp:  [98 °F (36.7 °C)] 98 °F (36.7 °C)  Heart Rate:  [106] 106  Resp:  [18] 18  BP: (157)/(84) 157/84    Flowsheet Rows    Flowsheet Row First Filed Value   Admission  "Height 167.6 cm (66\") Documented at 08/18/2022 1019   Admission Weight 77.1 kg (170 lb) Documented at 08/18/2022 1036           Physical Exam:  Physical Exam   Constitutional: Patient appears well-developed and well-nourished and in no acute distress   HEENT:   Head: Normocephalic and atraumatic.   Eyes:  Pupils are equal, round, and reactive to light. EOM are intact. Sclerae are anicteric and non-injected.  Mouth and Throat: Patient has moist mucous membranes. Oropharynx is clear of any erythema or exudate.     Neck: Neck supple. No JVD present. No thyromegaly present. No lymphadenopathy present.  Cardiovascular: Regular rate, regular rhythm, S1 normal and S2 normal.  Exam reveals no gallop and no friction rub.  No murmur heard.  Pulmonary/Chest: Lungs are clear to auscultation bilaterally. No respiratory distress. No wheezes. No rhonchi. No rales.   Abdominal: Soft. Bowel sounds are normal. No distension and no mass. There is no hepatosplenomegaly. There is no tenderness.   Musculoskeletal: Normal Muscle tone  Extremities: No edema. Pulses are palpable in all 4 extremities.  Neurological: Patient is alert and oriented to person, place, and time. Cranial nerves II-XII are grossly intact with no focal deficits.  Skin: Skin is warm. No rash noted. Nails show no clubbing.  No cyanosis or erythema.    Debilities/Disabilities Identified: None  Emotional Behavior: Appropriate     Results Review:   I reviewed the patient's new clinical results.    Lab Results (most recent)     None          Imaging Results (Most Recent)     None        reviewed    ECG/EMG Results (most recent)     None        reviewed    Assessment & Plan   Screening CRC and Family hx of CRC or polyps/  colonoscopy      I discussed the patient's findings and my recommendations with patient.     Alex Guerrero MD  08/23/22  14:00 EDT    Time: 10 min prior to procedure.    "

## 2022-08-23 NOTE — ANESTHESIA PREPROCEDURE EVALUATION
Anesthesia Evaluation     Patient summary reviewed and Nursing notes reviewed   history of anesthetic complications: PONV  NPO Solid Status: > 8 hours  NPO Liquid Status: > 8 hours           Airway   Mallampati: II  TM distance: >3 FB  Neck ROM: full  No difficulty expected  Dental - normal exam     Pulmonary    (-) rhonchi, decreased breath sounds, wheezes, not a smoker  Cardiovascular   Exercise tolerance: good (4-7 METS)    Rhythm: regular  Rate: normal    (+) hypertension, hyperlipidemia,   (-) CAD, angina, BETHEA, murmur      Neuro/Psych  (-) CVA  GI/Hepatic/Renal/Endo    (-) no renal disease, diabetes    Musculoskeletal     (+) neck pain,   Abdominal     Abdomen: soft.   Substance History      OB/GYN          Other                        Anesthesia Plan    ASA 2     MAC   total IV anesthesia  intravenous induction     Anesthetic plan, risks, benefits, and alternatives have been provided, discussed and informed consent has been obtained with: patient.        CODE STATUS:

## 2022-08-25 LAB
LAB AP CASE REPORT: NORMAL
PATH REPORT.FINAL DX SPEC: NORMAL
PATH REPORT.GROSS SPEC: NORMAL

## 2022-09-21 ENCOUNTER — OFFICE VISIT (OUTPATIENT)
Dept: FAMILY MEDICINE CLINIC | Facility: CLINIC | Age: 57
End: 2022-09-21

## 2022-09-21 VITALS
HEART RATE: 85 BPM | HEIGHT: 66 IN | OXYGEN SATURATION: 99 % | SYSTOLIC BLOOD PRESSURE: 128 MMHG | WEIGHT: 166 LBS | RESPIRATION RATE: 16 BRPM | DIASTOLIC BLOOD PRESSURE: 82 MMHG | TEMPERATURE: 97.3 F | BODY MASS INDEX: 26.68 KG/M2

## 2022-09-21 DIAGNOSIS — E78.2 MIXED HYPERLIPIDEMIA: ICD-10-CM

## 2022-09-21 DIAGNOSIS — Z00.00 WELL ADULT EXAM: ICD-10-CM

## 2022-09-21 DIAGNOSIS — R73.9 HYPERGLYCEMIA: ICD-10-CM

## 2022-09-21 DIAGNOSIS — G47.00 INSOMNIA, UNSPECIFIED TYPE: ICD-10-CM

## 2022-09-21 DIAGNOSIS — F41.9 ANXIETY: ICD-10-CM

## 2022-09-21 DIAGNOSIS — I10 ESSENTIAL HYPERTENSION: Primary | ICD-10-CM

## 2022-09-21 DIAGNOSIS — I10 ESSENTIAL HYPERTENSION: ICD-10-CM

## 2022-09-21 PROCEDURE — 99396 PREV VISIT EST AGE 40-64: CPT | Performed by: INTERNAL MEDICINE

## 2022-09-21 RX ORDER — SERTRALINE HYDROCHLORIDE 25 MG/1
25 TABLET, FILM COATED ORAL DAILY
Qty: 90 TABLET | Refills: 1 | Status: SHIPPED | OUTPATIENT
Start: 2022-09-21 | End: 2023-02-08

## 2022-09-21 RX ORDER — ATORVASTATIN CALCIUM 20 MG/1
20 TABLET, FILM COATED ORAL DAILY
Qty: 90 TABLET | Refills: 1 | Status: SHIPPED | OUTPATIENT
Start: 2022-09-21

## 2022-09-21 RX ORDER — LISINOPRIL 30 MG/1
30 TABLET ORAL DAILY
Qty: 90 TABLET | Refills: 1 | Status: SHIPPED | OUTPATIENT
Start: 2022-09-21

## 2022-09-21 RX ORDER — TRAZODONE HYDROCHLORIDE 50 MG/1
50 TABLET ORAL NIGHTLY PRN
Qty: 90 TABLET | Refills: 1 | Status: SHIPPED | OUTPATIENT
Start: 2022-09-21 | End: 2023-03-07 | Stop reason: SDUPTHER

## 2022-09-21 NOTE — PROGRESS NOTES
"Chief Complaint  Annual Exam    Subjective        Julisa Nixon presents to [facility]  History of Present Illness  Here for CPE.  She is doing well.  She has lost 21 pounds with eating low sugar diet/low carb due to elevated blood sugar readings.  She takes lipitor for HL and trazodone for insomnia.  HTN is controlled on lisinopril 30 mg qd although last night is was 190/109 for some reason. She was stressed out due to some travel ahead and the vaccination.  She took an extra lisinopril  And it is normal today.  She works with a [place] diabetic group which is helping her.  Had colonoscopy a few weeks ago .    She sees Dr. Salcedo for MMG and pap in January 2023  Has noticed that she feels very stressed by things, schedules, chores, etc.  Gets upset easily.  Her daughter is similar and recently started on zoloft and is a 'different person'  She is wondering if that would help her and maybe help lower her BP elevations.    Objective   Vital Signs:  /82 (BP Location: Left arm, Patient Position: Sitting, Cuff Size: Large Adult)   Pulse 85   Temp 97.3 °F (36.3 °C) (Temporal)   Resp 16   Ht 167.6 cm (66\")   Wt 75.3 kg (166 lb)   SpO2 99%   BMI 26.79 kg/m²   Estimated body mass index is 26.79 kg/m² as calculated from the following:    Height as of this encounter: 167.6 cm (66\").    Weight as of this encounter: 75.3 kg (166 lb).          Physical Exam  Vitals and nursing note reviewed.   Constitutional:       Appearance: Normal appearance. She is well-developed.   HENT:      Head: Normocephalic and atraumatic.      Right Ear: External ear normal.      Left Ear: External ear normal.   Eyes:      Extraocular Movements: Extraocular movements intact.      Conjunctiva/sclera: Conjunctivae normal.   Neck:      Vascular: No carotid bruit.   Cardiovascular:      Rate and Rhythm: Normal rate and regular rhythm.      Heart sounds: Normal heart sounds.      Comments: No " bruits  Pulmonary:      Effort: Pulmonary effort is normal. No respiratory distress.      Breath sounds: Normal breath sounds. No wheezing or rales.   Abdominal:      General: Bowel sounds are normal. There is no distension.      Palpations: Abdomen is soft. There is no mass.      Tenderness: There is no abdominal tenderness.   Musculoskeletal:      Cervical back: Neck supple.   Lymphadenopathy:      Cervical: No cervical adenopathy.   Skin:     General: Skin is warm.   Neurological:      General: No focal deficit present.      Mental Status: She is alert and oriented to person, place, and time.   Psychiatric:         Mood and Affect: Mood normal.         Behavior: Behavior normal.         Thought Content: Thought content normal.         Judgment: Judgment normal.        Result Review :                Assessment and Plan   Diagnoses and all orders for this visit:    1. Essential hypertension (Primary)  -     CBC & Differential; Future  -     Comprehensive Metabolic Panel; Future  -     Hemoglobin A1c; Future  -     Lipid Panel With LDL / HDL Ratio; Future  -     TSH; Future  -     T4, Free; Future  -     Urinalysis With Culture If Indicated -; Future    2. Mixed hyperlipidemia  -     CBC & Differential; Future  -     Comprehensive Metabolic Panel; Future  -     Hemoglobin A1c; Future  -     Lipid Panel With LDL / HDL Ratio; Future  -     TSH; Future  -     T4, Free; Future  -     Urinalysis With Culture If Indicated -; Future    3. Hyperglycemia  -     Hemoglobin A1c; Future    4. Insomnia, unspecified type    5. Well adult exam  -     CBC & Differential; Future  -     Comprehensive Metabolic Panel; Future  -     Hemoglobin A1c; Future  -     Lipid Panel With LDL / HDL Ratio; Future  -     TSH; Future  -     T4, Free; Future  -     Urinalysis With Culture If Indicated -; Future    6. Anxiety    Other orders  -     traZODone (DESYREL) 50 MG tablet; Take 1 tablet by mouth At Night As Needed for Sleep.  Dispense: 90  tablet; Refill: 1  -     atorvastatin (LIPITOR) 20 MG tablet; Take 1 tablet by mouth Daily.  Dispense: 90 tablet; Refill: 1  -     lisinopril (PRINIVIL,ZESTRIL) 30 MG tablet; Take 1 tablet by mouth Daily.  Dispense: 90 tablet; Refill: 1  -     sertraline (Zoloft) 25 MG tablet; Take 1 tablet by mouth Daily.  Dispense: 90 tablet; Refill: 1    anxiety--start low dose zoloft 25 mg qd. We reviewed potential side effects and how to take it appropriately.  She is aware that it will take a few weeks to see improvement.    HTN--continue lisinopril 30 mg qd.  She has already lost weight and is staying active.  I think the zoloft will help lower her anxiety which should help her BP as well  HL  Continue lipitor 20 mg qd  Insomnia continue trazodone 50 mg qhs prn  HM--MMG, Pap and CN are up to date.  Vaccinations reviewed.  Preventative counseling provided as well.  She will continue to monitor her carb intake and fat--exercise and stay active.  She is eligible for shingrix, flu shot and a covid booster but I recommend spreading them out and not taking all 3 at once.           Follow Up   Return in about 6 months (around 3/21/2023).  Patient was given instructions and counseling regarding her condition or for health maintenance advice. Please see specific information pulled into the AVS if appropriate.

## 2022-09-22 LAB
ALBUMIN SERPL-MCNC: 4.9 G/DL (ref 3.5–5.2)
ALBUMIN/GLOB SERPL: 1.8 G/DL
ALP SERPL-CCNC: 68 U/L (ref 39–117)
ALT SERPL-CCNC: 23 U/L (ref 1–33)
APPEARANCE UR: CLEAR
AST SERPL-CCNC: 18 U/L (ref 1–32)
BACTERIA #/AREA URNS HPF: NORMAL /HPF
BASOPHILS # BLD AUTO: 0.04 10*3/MM3 (ref 0–0.2)
BASOPHILS NFR BLD AUTO: 0.7 % (ref 0–1.5)
BILIRUB SERPL-MCNC: 0.4 MG/DL (ref 0–1.2)
BILIRUB UR QL STRIP: NEGATIVE
BUN SERPL-MCNC: 12 MG/DL (ref 6–20)
BUN/CREAT SERPL: 14 (ref 7–25)
CALCIUM SERPL-MCNC: 10 MG/DL (ref 8.6–10.5)
CASTS URNS QL MICRO: NORMAL /LPF
CHLORIDE SERPL-SCNC: 103 MMOL/L (ref 98–107)
CHOLEST SERPL-MCNC: 204 MG/DL (ref 0–200)
CO2 SERPL-SCNC: 28.3 MMOL/L (ref 22–29)
COLOR UR: YELLOW
CREAT SERPL-MCNC: 0.86 MG/DL (ref 0.57–1)
EGFRCR SERPLBLD CKD-EPI 2021: 77.9 ML/MIN/1.73
EOSINOPHIL # BLD AUTO: 0.07 10*3/MM3 (ref 0–0.4)
EOSINOPHIL NFR BLD AUTO: 1.1 % (ref 0.3–6.2)
EPI CELLS #/AREA URNS HPF: NORMAL /HPF (ref 0–10)
ERYTHROCYTE [DISTWIDTH] IN BLOOD BY AUTOMATED COUNT: 14.4 % (ref 12.3–15.4)
GLOBULIN SER CALC-MCNC: 2.8 GM/DL
GLUCOSE SERPL-MCNC: 95 MG/DL (ref 65–99)
GLUCOSE UR QL STRIP: NEGATIVE
HBA1C MFR BLD: 5.6 % (ref 4.8–5.6)
HCT VFR BLD AUTO: 46.1 % (ref 34–46.6)
HDLC SERPL-MCNC: 70 MG/DL (ref 40–60)
HGB BLD-MCNC: 14.7 G/DL (ref 12–15.9)
HGB UR QL STRIP: NEGATIVE
IMM GRANULOCYTES # BLD AUTO: 0.01 10*3/MM3 (ref 0–0.05)
IMM GRANULOCYTES NFR BLD AUTO: 0.2 % (ref 0–0.5)
KETONES UR QL STRIP: ABNORMAL
LDLC SERPL CALC-MCNC: 119 MG/DL (ref 0–100)
LDLC/HDLC SERPL: 1.68 {RATIO}
LEUKOCYTE ESTERASE UR QL STRIP: NEGATIVE
LYMPHOCYTES # BLD AUTO: 2.29 10*3/MM3 (ref 0.7–3.1)
LYMPHOCYTES NFR BLD AUTO: 37.5 % (ref 19.6–45.3)
MCH RBC QN AUTO: 29.1 PG (ref 26.6–33)
MCHC RBC AUTO-ENTMCNC: 31.9 G/DL (ref 31.5–35.7)
MCV RBC AUTO: 91.1 FL (ref 79–97)
MICRO URNS: ABNORMAL
MICRO URNS: ABNORMAL
MONOCYTES # BLD AUTO: 0.47 10*3/MM3 (ref 0.1–0.9)
MONOCYTES NFR BLD AUTO: 7.7 % (ref 5–12)
NEUTROPHILS # BLD AUTO: 3.23 10*3/MM3 (ref 1.7–7)
NEUTROPHILS NFR BLD AUTO: 52.8 % (ref 42.7–76)
NITRITE UR QL STRIP: NEGATIVE
NRBC BLD AUTO-RTO: 0.2 /100 WBC (ref 0–0.2)
PH UR STRIP: 6 [PH] (ref 5–7.5)
PLATELET # BLD AUTO: 318 10*3/MM3 (ref 140–450)
POTASSIUM SERPL-SCNC: 4.9 MMOL/L (ref 3.5–5.2)
PROT SERPL-MCNC: 7.7 G/DL (ref 6–8.5)
PROT UR QL STRIP: NEGATIVE
RBC # BLD AUTO: 5.06 10*6/MM3 (ref 3.77–5.28)
RBC #/AREA URNS HPF: NORMAL /HPF (ref 0–2)
SODIUM SERPL-SCNC: 142 MMOL/L (ref 136–145)
SP GR UR STRIP: 1.01 (ref 1–1.03)
T4 FREE SERPL-MCNC: 1.09 NG/DL (ref 0.93–1.7)
TRIGL SERPL-MCNC: 82 MG/DL (ref 0–150)
TSH SERPL DL<=0.005 MIU/L-ACNC: 1.44 UIU/ML (ref 0.27–4.2)
URINALYSIS REFLEX: ABNORMAL
UROBILINOGEN UR STRIP-MCNC: 0.2 MG/DL (ref 0.2–1)
VLDLC SERPL CALC-MCNC: 15 MG/DL (ref 5–40)
WBC # BLD AUTO: 6.11 10*3/MM3 (ref 3.4–10.8)
WBC #/AREA URNS HPF: NORMAL /HPF (ref 0–5)

## 2023-02-08 ENCOUNTER — OFFICE VISIT (OUTPATIENT)
Dept: OBSTETRICS AND GYNECOLOGY | Facility: CLINIC | Age: 58
End: 2023-02-08
Payer: COMMERCIAL

## 2023-02-08 VITALS
SYSTOLIC BLOOD PRESSURE: 130 MMHG | WEIGHT: 167.8 LBS | BODY MASS INDEX: 26.97 KG/M2 | DIASTOLIC BLOOD PRESSURE: 80 MMHG | HEIGHT: 66 IN

## 2023-02-08 DIAGNOSIS — Z11.51 SPECIAL SCREENING EXAMINATION FOR HUMAN PAPILLOMAVIRUS (HPV): ICD-10-CM

## 2023-02-08 DIAGNOSIS — Z01.419 PAP SMEAR, LOW-RISK: Primary | ICD-10-CM

## 2023-02-08 DIAGNOSIS — Z12.31 ENCOUNTER FOR SCREENING MAMMOGRAM FOR MALIGNANT NEOPLASM OF BREAST: ICD-10-CM

## 2023-02-08 DIAGNOSIS — Z13.820 SCREENING FOR OSTEOPOROSIS: ICD-10-CM

## 2023-02-08 DIAGNOSIS — Z01.419 ROUTINE GYNECOLOGICAL EXAMINATION: ICD-10-CM

## 2023-02-08 PROCEDURE — 81002 URINALYSIS NONAUTO W/O SCOPE: CPT | Performed by: OBSTETRICS & GYNECOLOGY

## 2023-02-08 PROCEDURE — 99396 PREV VISIT EST AGE 40-64: CPT | Performed by: OBSTETRICS & GYNECOLOGY

## 2023-02-08 NOTE — PROGRESS NOTES
GYN Annual Exam     CC- Here for annual exam.     Julisa Nixon is a 57 y.o. female established patient who presents for annual well woman exam. NO  VB. She denies any issues with her bladder. She wants to recheck DEXA this year because she has had a leg injury and has not been exercising. Her daughter in town had a boy 3 weeks ago. Her daughter in [place] had 2 ectopics and an IUFD at 28 weeks and we discussed MFM workup should be done. She and her  are not SA but very happy.     OB History        2    Para   2    Term   2            AB        Living   2       SAB        IAB        Ectopic        Molar        Multiple        Live Births              Obstetric Comments   2              Menarche:11  Menopause:44  HRT:none  Current contraception: status post hysterectomy- s/p TLH with OC for DUB age 44  History of abnormal Pap smear: no  History of abnormal mammogram: yes - B9 cyst bx  Family history of uterine, colon or ovarian cancer: yes - PGF and P aunt colon  Family history of breast cancer: no  STD's: none  Last pap smear:2021 nl x 2  VIOLETA: none      Health Maintenance   Topic Date Due   • Hepatitis B (1 of 3 - 3-dose series) Never done   • Pneumococcal Vaccine 0-64 (1 - PCV) Never done   • HEPATITIS C SCREENING  Never done   • DIABETIC EYE EXAM  Never done   • COVID-19 Vaccine (4 - Booster) 2022   • Annual Gynecologic Pelvic and Breast Exam  2023   • ZOSTER VACCINE (1 of 2) 2023 (Originally 2013)   • HEMOGLOBIN A1C  2023   • ANNUAL PHYSICAL  2023   • DIABETIC FOOT EXAM  2023   • LIPID PANEL  2023   • URINE MICROALBUMIN  2023   • PAP SMEAR  2024   • MAMMOGRAM  2024   • TDAP/TD VACCINES (2 - Td or Tdap) 2028   • COLORECTAL CANCER SCREENING  2032   • INFLUENZA VACCINE  Completed       Past Medical History:   Diagnosis Date   • Hypertension    • PONV (postoperative nausea and vomiting)    • Rheumatic  fever     and    • Shoulder pain, right        Past Surgical History:   Procedure Laterality Date   • BREAST BIOPSY     •  SECTION     • COLONOSCOPY  2014    KALIE SILVESTRE MD   • COLONOSCOPY N/A 2022    Procedure: COLONOSCOPY with Polypectomy;  Surgeon: Alex Guerrero MD;  Location: [facility] MA[facility] OR;  Service: Gastroenterology;  Laterality: N/A;  Sigmoid polyp, Rectal polyp x2   • HYSTERECTOMY      TLH with OC age 44 for DUB   • VAGINAL HYSTERECTOMY           Current Outpatient Medications:   •  aspirin 81 MG oral suspension, Take 81 mg by mouth Daily., Disp: , Rfl:   •  atorvastatin (LIPITOR) 20 MG tablet, Take 1 tablet by mouth Daily., Disp: 90 tablet, Rfl: 1  •  lisinopril (PRINIVIL,ZESTRIL) 30 MG tablet, Take 1 tablet by mouth Daily., Disp: 90 tablet, Rfl: 1  •  traZODone (DESYREL) 50 MG tablet, Take 1 tablet by mouth At Night As Needed for Sleep., Disp: 90 tablet, Rfl: 1    No Known Allergies    Social History     Tobacco Use   • Smoking status: Never   • Smokeless tobacco: Never   Vaping Use   • Vaping Use: Never used   Substance Use Topics   • Alcohol use: Yes     Comment: rare   • Drug use: No       Family History   Problem Relation Age of Onset   • Stroke Father    • Colon cancer Paternal Grandfather    • Colon cancer Paternal Aunt    • Breast cancer Neg Hx    • Ovarian cancer Neg Hx    • Deep vein thrombosis Neg Hx    • Pulmonary embolism Neg Hx        Review of Systems   Constitutional: Negative for activity change, appetite change, fatigue, fever and unexpected weight change.   Respiratory: Negative for cough and shortness of breath.    Cardiovascular: Negative for chest pain and palpitations.   Gastrointestinal: Negative for abdominal distention, abdominal pain, constipation, diarrhea and nausea.   Endocrine: Negative for cold intolerance and heat intolerance.   Genitourinary: Negative for dyspareunia, dysuria, menstrual problem, pelvic pain, vaginal bleeding,  "vaginal discharge and vaginal pain.   Musculoskeletal: Negative for arthralgias, back pain, gait problem, neck pain and neck stiffness.   Skin: Negative for color change and rash.   Neurological: Negative for headaches.   Psychiatric/Behavioral: Negative for dysphoric mood. The patient is not nervous/anxious.        /80   Ht 167.6 cm (66\")   Wt 76.1 kg (167 lb 12.8 oz)   BMI 27.08 kg/m²     Physical Exam   Constitutional: She is oriented to person, place, and time. She appears well-developed.   HENT:   Head: Normocephalic and atraumatic.   Eyes: Conjunctivae are normal. No scleral icterus.   Neck: No thyromegaly present.   Cardiovascular: Normal rate and regular rhythm.   Pulmonary/Chest: Effort normal and breath sounds normal. Right breast exhibits no inverted nipple, no mass, no nipple discharge, no skin change and no tenderness. Left breast exhibits no inverted nipple, no mass, no nipple discharge, no skin change and no tenderness.   Abdominal: Soft. Normal appearance and bowel sounds are normal. She exhibits no distension and no mass. There is no abdominal tenderness. There is no rebound and no guarding. No hernia.   Genitourinary:    Rectum normal.      Pelvic exam was performed with patient supine.   There is no rash, tenderness, lesion or injury on the right labia. There is no rash, tenderness, lesion or injury on the left labia. Right adnexum displays no mass, no tenderness and no fullness. Left adnexum displays no mass, no tenderness and no fullness.    No vaginal discharge, erythema, tenderness or bleeding.   No erythema, tenderness or bleeding in the vagina.    No foreign body in the vagina.      No signs of injury in the vagina.      Genitourinary Comments: Normal cuff-uterus and cervix absent       Neurological: She is alert and oriented to person, place, and time.   Skin: Skin is warm and dry.   Psychiatric: Her behavior is normal. Mood, judgment and thought content normal.   Nursing note and " vitals reviewed.         Assessment/Plan    1) GYN HM: normal pap/HPV 1/2021, check pap/HPV. SBE demonstrated and encouraged.  2) STD screening: declines Condoms encouraged.  3) Bone health - Weight bearing exercise, dietary calcium recommendations and vitamin D reviewed.   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   8) DEXA- UTD 2/2019- normal. Repeat in 3/2023  9)C scope- UTD 8/2022, repeat in 5 years  10) Parts of this document have been copied or forwarded from her previous visits and have been reviewed, updated and edited as indicated.   11) I saw the patient with a face mask, gloves and eye protection  The patient herself was masked.  Social distancing was observed as appropriate.  12)Follow up prn and 1 year annual       Diagnoses and all orders for this visit:    1. Pap smear, low-risk (Primary)  -     IGP, Apt HPV,rfx 16 / 18,45    2. Routine gynecological examination  -     POC Urinalysis Dipstick  -     IGP, Apt HPV,rfx 16 / 18,45    3. Special screening examination for human papillomavirus (HPV)  -     IGP, Apt HPV,rfx 16 / 18,45    4. Encounter for screening mammogram for malignant neoplasm of breast  -     Mammo Screening Digital Tomosynthesis Bilateral With CAD; Future    5. Screening for osteoporosis  -     DEXA Bone Density Axial; Future        Ariana Salcedo MD  2/8/2023  12:31 EST

## 2023-02-14 LAB
CYTOLOGIST CVX/VAG CYTO: NORMAL
CYTOLOGY CVX/VAG DOC CYTO: NORMAL
CYTOLOGY CVX/VAG DOC THIN PREP: NORMAL
DX ICD CODE: NORMAL
HIV 1 & 2 AB SER-IMP: NORMAL
HPV I/H RISK 4 DNA CVX QL PROBE+SIG AMP: NEGATIVE
OTHER STN SPEC: NORMAL
STAT OF ADQ CVX/VAG CYTO-IMP: NORMAL

## 2023-02-15 RX ORDER — FLUCONAZOLE 150 MG/1
150 TABLET ORAL ONCE
Qty: 1 TABLET | Refills: 1 | Status: SHIPPED | OUTPATIENT
Start: 2023-02-15 | End: 2023-02-15

## 2023-03-07 RX ORDER — TRAZODONE HYDROCHLORIDE 50 MG/1
50 TABLET ORAL NIGHTLY PRN
Qty: 90 TABLET | Refills: 1 | Status: SHIPPED | OUTPATIENT
Start: 2023-03-07

## 2023-03-24 ENCOUNTER — HOSPITAL ENCOUNTER (OUTPATIENT)
Dept: MAMMOGRAPHY | Facility: HOSPITAL | Age: 58
Discharge: HOME | End: 2023-03-24
Admitting: OBSTETRICS & GYNECOLOGY
Payer: COMMERCIAL

## 2023-03-24 DIAGNOSIS — Z12.31 ENCOUNTER FOR SCREENING MAMMOGRAM FOR MALIGNANT NEOPLASM OF BREAST: ICD-10-CM

## 2023-03-24 PROCEDURE — 77063 BREAST TOMOSYNTHESIS BI: CPT

## 2023-03-24 PROCEDURE — 77067 SCR MAMMO BI INCL CAD: CPT

## 2023-04-24 ENCOUNTER — TELEPHONE (OUTPATIENT)
Dept: FAMILY MEDICINE CLINIC | Facility: CLINIC | Age: 58
End: 2023-04-24
Payer: COMMERCIAL

## 2023-04-24 DIAGNOSIS — I10 ESSENTIAL HYPERTENSION: ICD-10-CM

## 2023-04-24 DIAGNOSIS — R73.9 HYPERGLYCEMIA: ICD-10-CM

## 2023-04-24 DIAGNOSIS — E78.2 MIXED HYPERLIPIDEMIA: Primary | ICD-10-CM

## 2023-04-24 NOTE — TELEPHONE ENCOUNTER
Caller: Julisa Nixon    Relationship: Self    Best call back number: [phone]    What orders are you requesting (i.e. lab or imaging): BLOOD WORK     In what timeframe would the patient need to come in: ASAP HAS APPOINTMENT  ON  4/28/23    Where will you receive your lab/imaging services: OFFICE    Additional notes: PLEASE CALL TO SCHEDULE LAB APPOINTMENT ONCE LABS ARE PLACED      LOS NOTE    Patient currently resting in bed with eyes closed. RR even and unlabored. No distress noted. LOS continues for patient safety.

## 2023-04-28 ENCOUNTER — OFFICE VISIT (OUTPATIENT)
Dept: FAMILY MEDICINE CLINIC | Facility: CLINIC | Age: 58
End: 2023-04-28
Payer: COMMERCIAL

## 2023-04-28 VITALS
OXYGEN SATURATION: 97 % | SYSTOLIC BLOOD PRESSURE: 138 MMHG | BODY MASS INDEX: 27.14 KG/M2 | HEIGHT: 66 IN | RESPIRATION RATE: 16 BRPM | DIASTOLIC BLOOD PRESSURE: 84 MMHG | HEART RATE: 105 BPM | WEIGHT: 168.9 LBS | TEMPERATURE: 96.2 F

## 2023-04-28 DIAGNOSIS — I10 ESSENTIAL HYPERTENSION: Primary | ICD-10-CM

## 2023-04-28 DIAGNOSIS — R73.9 HYPERGLYCEMIA: ICD-10-CM

## 2023-04-28 DIAGNOSIS — E78.2 MIXED HYPERLIPIDEMIA: ICD-10-CM

## 2023-04-28 PROCEDURE — 99214 OFFICE O/P EST MOD 30 MIN: CPT | Performed by: INTERNAL MEDICINE

## 2023-04-28 RX ORDER — LISINOPRIL 30 MG/1
30 TABLET ORAL DAILY
Qty: 90 TABLET | Refills: 1 | Status: SHIPPED | OUTPATIENT
Start: 2023-04-28

## 2023-04-28 RX ORDER — BENZONATATE 200 MG/1
200 CAPSULE ORAL 3 TIMES DAILY PRN
Qty: 30 CAPSULE | Refills: 0 | Status: SHIPPED | OUTPATIENT
Start: 2023-04-28

## 2023-04-28 RX ORDER — ATORVASTATIN CALCIUM 20 MG/1
20 TABLET, FILM COATED ORAL DAILY
Qty: 90 TABLET | Refills: 1 | Status: SHIPPED | OUTPATIENT
Start: 2023-04-28

## 2023-04-28 NOTE — PROGRESS NOTES
"Chief Complaint  Cough (Pt states has had about a week )    Subjective        Julisa Nixon presents to [facility]  History of Present Illness  Patient is here for follow-up on hyperlipidemia, hypertension, prediabetes.  Patient c/o PND and dry cough for a week. No fever and no ST.  No nasal congestion.  She is taking zyrtec D and took it last night.  HR and BP are a little high for her today.  Pap smear 2/2023 normal  PMH of HL on lipitor and HTN on lisinopril .  Takes 1/2 to 1 tab of trazodone for insomnia.  H/o prediabetes and is watching her diet closely and walks a lot.    Objective   Vital Signs:  /84   Pulse 105   Temp 96.2 °F (35.7 °C) (Temporal)   Resp 16   Ht 167.6 cm (66\")   Wt 76.6 kg (168 lb 14.4 oz)   SpO2 97%   BMI 27.26 kg/m²   Estimated body mass index is 27.26 kg/m² as calculated from the following:    Height as of this encounter: 167.6 cm (66\").    Weight as of this encounter: 76.6 kg (168 lb 14.4 oz).             Physical Exam  Vitals and nursing note reviewed.   Constitutional:       Appearance: Normal appearance. She is well-developed.   HENT:      Head: Normocephalic and atraumatic.      Right Ear: External ear normal.      Left Ear: External ear normal.   Eyes:      Extraocular Movements: Extraocular movements intact.      Conjunctiva/sclera: Conjunctivae normal.   Neck:      Vascular: No carotid bruit.   Cardiovascular:      Rate and Rhythm: Normal rate and regular rhythm.      Heart sounds: Normal heart sounds.      Comments: No bruits  Pulmonary:      Effort: Pulmonary effort is normal. No respiratory distress.      Breath sounds: Normal breath sounds. No stridor. No wheezing, rhonchi or rales.   Chest:      Chest wall: No tenderness.   Abdominal:      General: Bowel sounds are normal. There is no distension.      Palpations: Abdomen is soft. There is no mass.      Tenderness: There is no abdominal tenderness. There is no guarding or rebound. "      Hernia: No hernia is present.   Musculoskeletal:      Cervical back: Neck supple.   Lymphadenopathy:      Cervical: No cervical adenopathy.   Skin:     General: Skin is warm.   Neurological:      Mental Status: She is alert and oriented to person, place, and time. Mental status is at baseline.   Psychiatric:         Mood and Affect: Mood normal.         Behavior: Behavior normal.         Thought Content: Thought content normal.         Judgment: Judgment normal.        Result Review :                   Assessment and Plan   Diagnoses and all orders for this visit:    1. Essential hypertension (Primary)    2. Mixed hyperlipidemia    3. Hyperglycemia    Other orders  -     benzonatate (TESSALON) 200 MG capsule; Take 1 capsule by mouth 3 (Three) Times a Day As Needed for Cough.  Dispense: 30 capsule; Refill: 0  -     atorvastatin (LIPITOR) 20 MG tablet; Take 1 tablet by mouth Daily.  Dispense: 90 tablet; Refill: 1  -     lisinopril (PRINIVIL,ZESTRIL) 30 MG tablet; Take 1 tablet by mouth Daily.  Dispense: 90 tablet; Refill: 1      Advised patient to stop taking Zyrtec-D because it is causing her heart rate to go faster and is causing blood pressure elevation.  She can certainly take plain Zyrtec 10 mg once daily for postnasal drip.  I did prescribe Tessalon Perles for her as she thinks that is helpful with her dry cough.  She does not have acute illness in does not appear contagious at this time.  Hyperlipidemia continue Lipitor 20 mg once daily refill provided.  Hypertension continue 30 mg lisinopril daily blood pressure well controlled in general today it is up because of the decongestant.  There are fasting lab orders already placed in epic for her.  When we leave today she will schedule with our outpatient lab to get a blood draw next week.       Follow Up   Return in about 6 months (around 10/28/2023).  Patient was given instructions and counseling regarding her condition or for health maintenance advice.  Please see specific information pulled into the AVS if appropriate.

## 2023-08-14 ENCOUNTER — APPOINTMENT (OUTPATIENT)
Dept: GENERAL RADIOLOGY | Facility: HOSPITAL | Age: 58
End: 2023-08-14
Payer: COMMERCIAL

## 2023-08-14 ENCOUNTER — HOSPITAL ENCOUNTER (EMERGENCY)
Facility: HOSPITAL | Age: 58
Discharge: HOME | End: 2023-08-14
Attending: EMERGENCY MEDICINE | Admitting: EMERGENCY MEDICINE
Payer: COMMERCIAL

## 2023-08-14 VITALS
HEIGHT: 66 IN | OXYGEN SATURATION: 96 % | TEMPERATURE: 98.4 F | HEART RATE: 79 BPM | DIASTOLIC BLOOD PRESSURE: 72 MMHG | WEIGHT: 168 LBS | BODY MASS INDEX: 27 KG/M2 | RESPIRATION RATE: 15 BRPM | SYSTOLIC BLOOD PRESSURE: 126 MMHG

## 2023-08-14 DIAGNOSIS — R07.9 CHEST PAIN, UNSPECIFIED TYPE: Primary | ICD-10-CM

## 2023-08-14 LAB
ALBUMIN SERPL-MCNC: 4.4 G/DL (ref 3.5–5.2)
ALBUMIN/GLOB SERPL: 1.5 G/DL
ALP SERPL-CCNC: 62 U/L (ref 39–117)
ALT SERPL W P-5'-P-CCNC: 20 U/L (ref 1–33)
ANION GAP SERPL CALCULATED.3IONS-SCNC: 9.1 MMOL/L (ref 5–15)
AST SERPL-CCNC: 15 U/L (ref 1–32)
BASOPHILS # BLD AUTO: 0.01 10*3/MM3 (ref 0–0.2)
BASOPHILS NFR BLD AUTO: 0.2 % (ref 0–1.5)
BILIRUB SERPL-MCNC: 0.3 MG/DL (ref 0–1.2)
BUN SERPL-MCNC: 14 MG/DL (ref 8–23)
BUN/CREAT SERPL: 18.2 (ref 7–25)
CALCIUM SPEC-SCNC: 10.2 MG/DL (ref 8.6–10.5)
CHLORIDE SERPL-SCNC: 101 MMOL/L (ref 98–107)
CO2 SERPL-SCNC: 28.9 MMOL/L (ref 22–29)
CREAT SERPL-MCNC: 0.77 MG/DL (ref 0.57–1)
DEPRECATED RDW RBC AUTO: 45 FL (ref 37–54)
EGFRCR SERPLBLD CKD-EPI 2021: 88.4 ML/MIN/1.73
EOSINOPHIL # BLD AUTO: 0.05 10*3/MM3 (ref 0–0.4)
EOSINOPHIL NFR BLD AUTO: 1 % (ref 0.3–6.2)
ERYTHROCYTE [DISTWIDTH] IN BLOOD BY AUTOMATED COUNT: 13.3 % (ref 12.3–15.4)
GEN 5 2HR TROPONIN T REFLEX: 7 NG/L
GLOBULIN UR ELPH-MCNC: 3 GM/DL
GLUCOSE SERPL-MCNC: 113 MG/DL (ref 65–99)
HCT VFR BLD AUTO: 43.4 % (ref 34–46.6)
HGB BLD-MCNC: 14.2 G/DL (ref 12–15.9)
IMM GRANULOCYTES # BLD AUTO: 0 10*3/MM3 (ref 0–0.05)
IMM GRANULOCYTES NFR BLD AUTO: 0 % (ref 0–0.5)
LIPASE SERPL-CCNC: 40 U/L (ref 13–60)
LYMPHOCYTES # BLD AUTO: 1.75 10*3/MM3 (ref 0.7–3.1)
LYMPHOCYTES NFR BLD AUTO: 34.5 % (ref 19.6–45.3)
MCH RBC QN AUTO: 29.2 PG (ref 26.6–33)
MCHC RBC AUTO-ENTMCNC: 32.7 G/DL (ref 31.5–35.7)
MCV RBC AUTO: 89.1 FL (ref 79–97)
MONOCYTES # BLD AUTO: 0.38 10*3/MM3 (ref 0.1–0.9)
MONOCYTES NFR BLD AUTO: 7.5 % (ref 5–12)
NEUTROPHILS NFR BLD AUTO: 2.88 10*3/MM3 (ref 1.7–7)
NEUTROPHILS NFR BLD AUTO: 56.8 % (ref 42.7–76)
PLATELET # BLD AUTO: 299 10*3/MM3 (ref 140–450)
PMV BLD AUTO: 8.9 FL (ref 6–12)
POTASSIUM SERPL-SCNC: 3.9 MMOL/L (ref 3.5–5.2)
PROT SERPL-MCNC: 7.4 G/DL (ref 6–8.5)
QT INTERVAL: 358 MS
RBC # BLD AUTO: 4.87 10*6/MM3 (ref 3.77–5.28)
SODIUM SERPL-SCNC: 139 MMOL/L (ref 136–145)
TROPONIN T DELTA: 0 NG/L
TROPONIN T SERPL HS-MCNC: 7 NG/L
WBC NRBC COR # BLD: 5.07 10*3/MM3 (ref 3.4–10.8)

## 2023-08-14 PROCEDURE — 85025 COMPLETE CBC W/AUTO DIFF WBC: CPT | Performed by: EMERGENCY MEDICINE

## 2023-08-14 PROCEDURE — 84484 ASSAY OF TROPONIN QUANT: CPT | Performed by: EMERGENCY MEDICINE

## 2023-08-14 PROCEDURE — 99284 EMERGENCY DEPT VISIT MOD MDM: CPT

## 2023-08-14 PROCEDURE — 80053 COMPREHEN METABOLIC PANEL: CPT | Performed by: EMERGENCY MEDICINE

## 2023-08-14 PROCEDURE — 71046 X-RAY EXAM CHEST 2 VIEWS: CPT

## 2023-08-14 PROCEDURE — 93010 ELECTROCARDIOGRAM REPORT: CPT | Performed by: INTERNAL MEDICINE

## 2023-08-14 PROCEDURE — 93005 ELECTROCARDIOGRAM TRACING: CPT | Performed by: EMERGENCY MEDICINE

## 2023-08-14 PROCEDURE — 83690 ASSAY OF LIPASE: CPT | Performed by: NURSE PRACTITIONER

## 2023-08-14 PROCEDURE — 96360 HYDRATION IV INFUSION INIT: CPT

## 2023-08-14 PROCEDURE — 36415 COLL VENOUS BLD VENIPUNCTURE: CPT

## 2023-08-14 RX ORDER — NITROGLYCERIN 0.4 MG/1
0.4 TABLET SUBLINGUAL
Status: DISCONTINUED | OUTPATIENT
Start: 2023-08-14 | End: 2023-08-14 | Stop reason: HOSPADM

## 2023-08-14 RX ORDER — SODIUM CHLORIDE 0.9 % (FLUSH) 0.9 %
10 SYRINGE (ML) INJECTION AS NEEDED
Status: DISCONTINUED | OUTPATIENT
Start: 2023-08-14 | End: 2023-08-14 | Stop reason: HOSPADM

## 2023-08-14 RX ORDER — ASPIRIN 81 MG/1
324 TABLET, CHEWABLE ORAL ONCE
Status: COMPLETED | OUTPATIENT
Start: 2023-08-14 | End: 2023-08-14

## 2023-08-14 RX ADMIN — SODIUM CHLORIDE 1000 ML: 9 INJECTION, SOLUTION INTRAVENOUS at 11:34

## 2023-08-14 RX ADMIN — NITROGLYCERIN 0.4 MG: 0.4 TABLET SUBLINGUAL at 11:22

## 2023-08-14 RX ADMIN — NITROGLYCERIN 0.4 MG: 0.4 TABLET SUBLINGUAL at 11:03

## 2023-08-14 RX ADMIN — ASPIRIN 243 MG: 81 TABLET, CHEWABLE ORAL at 11:02

## 2023-08-14 NOTE — FSED PROVIDER NOTE
Subjective   History of Present Illness  Patient is a 58-year-old female who presents complaining of burning pain in her left chest and shoulder that started last night.  She did go to urgent care today who referred her to the emergency room for further work-up.  She denies any shortness of breath, nausea, vomiting.  She does report a history of high blood pressure and high cholesterol but denies any prior cardiac disease.    Review of Systems   Cardiovascular:  Positive for chest pain.   All other systems reviewed and are negative.    Past Medical History:   Diagnosis Date    Hyperlipidemia     Hypertension     PONV (postoperative nausea and vomiting)     Rheumatic fever     and     Shoulder pain, right        No Known Allergies    Past Surgical History:   Procedure Laterality Date    BREAST BIOPSY       SECTION      COLONOSCOPY  2014    KALIE SILVESTRE MD    COLONOSCOPY N/A 2022    Procedure: COLONOSCOPY with Polypectomy;  Surgeon: Alex Guerrero MD;  [place] [facility] [place];  Service: Gastroenterology;  Laterality: N/A;  Sigmoid polyp, Rectal polyp x2    HYSTERECTOMY      TLH with OC age 44 for DUB    VAGINAL HYSTERECTOMY         Family History   Problem Relation Age of Onset    Stroke Father     Colon cancer Paternal Grandfather     Colon cancer Paternal Aunt     Breast cancer Neg Hx     Ovarian cancer Neg Hx     Deep vein thrombosis Neg Hx     Pulmonary embolism Neg Hx        Social History     Socioeconomic History    Marital status:    Tobacco Use    Smoking status: Never    Smokeless tobacco: Never   Vaping Use    Vaping Use: Never used   Substance and Sexual Activity    Alcohol use: Yes     Comment: rare    Drug use: No    Sexual activity: Not Currently     Partners: Male     Birth control/protection: Post-menopausal, Hysterectomy     Comment: Hysterectomy           Objective   Physical Exam  Vitals and nursing note reviewed.   Constitutional:       Appearance:  She is well-developed.   HENT:      Head: Normocephalic and atraumatic.   Cardiovascular:      Rate and Rhythm: Normal rate and regular rhythm.      Heart sounds: Normal heart sounds.   Pulmonary:      Effort: Pulmonary effort is normal.      Breath sounds: Normal breath sounds.   Abdominal:      Palpations: Abdomen is soft.      Tenderness: There is no abdominal tenderness.   Musculoskeletal:      Cervical back: Normal range of motion and neck supple.   Skin:     General: Skin is warm and dry.      Capillary Refill: Capillary refill takes less than 2 seconds.   Neurological:      General: No focal deficit present.      Mental Status: She is alert and oriented to person, place, and time.       Procedures           ED Course                                           Medical Decision Making  Chest x-ray negative for acute findings, laboratory findings reassuring.  Score of 2.  Negative Trope x2.  She is to follow up with cardiology as an outpatient, given strict return precautions.    Problems Addressed:  Chest pain, unspecified type: complicated acute illness or injury    Amount and/or Complexity of Data Reviewed  Labs: ordered.  Radiology: ordered.  ECG/medicine tests: ordered.    Risk  OTC drugs.  Prescription drug management.        Final diagnoses:   Chest pain, unspecified type       ED Disposition  ED Disposition       ED Disposition   Discharge    Condition   Stable    Comment   --               [facility] CARDIOLOGY  [address] [address] [phone]             Medication List      No changes were made to your prescriptions during this visit.

## 2023-08-14 NOTE — ED NOTES
"PATIENT CALLED RN TO ROOM AT THIS TIME STATING, \"I AM NOT FEELING WELL.\" PT NOTED PALE, DIAPHORETIC. BLOOD PRESSURE RECHECKED AT THIS TIME (63/45). APRN NOTIFIED, NEW ORDERS RECEIVED. PT REPOSITIONED IN BED AND NS STARTED WITH PRESSURE BAG.   "

## 2023-08-16 ENCOUNTER — TELEPHONE (OUTPATIENT)
Dept: CARDIOLOGY | Facility: CLINIC | Age: 58
End: 2023-08-16

## 2023-08-16 NOTE — TELEPHONE ENCOUNTER
Caller: Julisa Nixon    Relationship to patient: Self    Best call back number: [phone]    Chief complaint: SEVERE BURNING SENSATION IN HER CHEST AND LEGS. SHE WAS AT THE ED AND THEY INSTRUCTED HER TO FOLLOW UP WITH HER CARDIOLOGIST.     Type of visit: FU    Requested date: ASAP     Additional notes: PT SAW DR. FERRER IN 2021. WITHIN 3 YEAR GUIDELINE FOR [employer].      SHE MENTIONS SEEING DR. FERRER AT THE [facility] [place] LOCATION

## 2023-08-17 ENCOUNTER — TELEPHONE (OUTPATIENT)
Dept: GASTROENTEROLOGY | Facility: CLINIC | Age: 58
End: 2023-08-17
Payer: COMMERCIAL

## 2023-08-21 ENCOUNTER — TELEPHONE (OUTPATIENT)
Dept: FAMILY MEDICINE CLINIC | Facility: CLINIC | Age: 58
End: 2023-08-21

## 2023-08-21 NOTE — TELEPHONE ENCOUNTER
Caller: Julisa Nixon    Relationship: Self    Best call back number: [phone]    What is the medical concern/diagnosis: SHOULDER AND BACK PAIN     What specialty or service is being requested: RHEUMATOLOGY       Any additional details: PLEASE ADVISE.

## 2023-08-22 ENCOUNTER — OFFICE VISIT (OUTPATIENT)
Dept: GASTROENTEROLOGY | Facility: CLINIC | Age: 58
End: 2023-08-22
Payer: COMMERCIAL

## 2023-08-22 VITALS
HEIGHT: 66 IN | BODY MASS INDEX: 26.52 KG/M2 | SYSTOLIC BLOOD PRESSURE: 138 MMHG | DIASTOLIC BLOOD PRESSURE: 82 MMHG | WEIGHT: 165 LBS

## 2023-08-22 DIAGNOSIS — K58.1 IRRITABLE BOWEL SYNDROME WITH CONSTIPATION: ICD-10-CM

## 2023-08-22 DIAGNOSIS — R10.13 DYSPEPSIA: Primary | ICD-10-CM

## 2023-08-22 DIAGNOSIS — K21.9 GASTROESOPHAGEAL REFLUX DISEASE, UNSPECIFIED WHETHER ESOPHAGITIS PRESENT: ICD-10-CM

## 2023-08-22 DIAGNOSIS — Z86.010 PERSONAL HISTORY OF COLONIC POLYPS: ICD-10-CM

## 2023-08-22 PROCEDURE — 99214 OFFICE O/P EST MOD 30 MIN: CPT | Performed by: STUDENT IN AN ORGANIZED HEALTH CARE EDUCATION/TRAINING PROGRAM

## 2023-08-22 RX ORDER — PLECANATIDE 3 MG/1
3 TABLET ORAL DAILY
Qty: 30 TABLET | Refills: 11 | Status: SHIPPED | OUTPATIENT
Start: 2023-08-22

## 2023-08-22 RX ORDER — CALCIUM CARBONATE 500 MG/1
1 TABLET, CHEWABLE ORAL AS NEEDED
COMMUNITY

## 2023-08-22 RX ORDER — OMEPRAZOLE 40 MG/1
40 CAPSULE, DELAYED RELEASE ORAL
Qty: 180 CAPSULE | Refills: 3 | Status: SHIPPED | OUTPATIENT
Start: 2023-08-22

## 2023-08-22 RX ORDER — OMEPRAZOLE 20 MG/1
20 CAPSULE, DELAYED RELEASE ORAL DAILY PRN
COMMUNITY
End: 2023-08-22 | Stop reason: SDUPTHER

## 2023-08-22 RX ORDER — ALUMINUM HYDROXIDE AND MAGNESIUM CARBONATE 254; 237.5 MG/5ML; MG/5ML
20 LIQUID ORAL 4 TIMES DAILY PRN
Qty: 355 ML | Refills: 11 | Status: SHIPPED | OUTPATIENT
Start: 2023-08-22

## 2023-08-22 NOTE — PROGRESS NOTES
Julisa Nixon is a 58 y.o. female with a past medical history of HTN, HLD who presents for evaluation of   Chief Complaint   Patient presents with    Chest Pain    Constipation    Heartburn       Subjective     # GERD   # Dyspepsia   - Ongoing for 1 week. Further described as significant heartburn and epigastric pain. Reports heartburn is the most significant when lying down.   - Recently present to the ER with significant heartburn and epigastric pain.   - Cardiac workup was unrevealing.   - Denies NSAID use.   - Denies overt bleeding.   - No previous EGD.   - Started taking Omeprazole 20 mg QD recently with mild relief.     # IBS-C   - Reports constipation for 5 years. Further described as 2 bowel movements per week with straining.     # Personal history of colon polyps   - Had colonoscopy in 8/2022 that had 3 hyperplastic polyps and no adenomatous polyps.                 Past Medical History:   Diagnosis Date    Hyperlipidemia     Hypertension     PONV (postoperative nausea and vomiting)     Rheumatic fever 1970    and 1972    Shoulder pain, right          Current Outpatient Medications:     aspirin 81 MG oral suspension, Take 81 mL by mouth Daily., Disp: , Rfl:     atorvastatin (LIPITOR) 20 MG tablet, Take 1 tablet by mouth Daily., Disp: 90 tablet, Rfl: 1    calcium carbonate (TUMS) 500 MG chewable tablet, Chew 1 tablet As Needed for Indigestion or Heartburn., Disp: , Rfl:     lisinopril (PRINIVIL,ZESTRIL) 30 MG tablet, Take 1 tablet by mouth Daily., Disp: 90 tablet, Rfl: 1    omeprazole (priLOSEC) 40 MG capsule, Take 1 capsule by mouth 2 (Two) Times a Day Before Meals., Disp: 180 capsule, Rfl: 3    traZODone (DESYREL) 50 MG tablet, Take 1 tablet by mouth At Night As Needed for Sleep., Disp: 90 tablet, Rfl: 1    Alum Hydroxide-Mag Carbonate (Gaviscon Extra Strength) 508-475 MG/10ML suspension, Take 20 mL by mouth 4 (Four) Times a Day As Needed (heartburn)., Disp: 355 mL, Rfl: 11    Plecanatide (Trulance) 3  MG tablet, Take 1 tablet by mouth Daily., Disp: 30 tablet, Rfl: 11    No Known Allergies    Social History     Socioeconomic History    Marital status:    Tobacco Use    Smoking status: Never    Smokeless tobacco: Never   Vaping Use    Vaping Use: Never used   Substance and Sexual Activity    Alcohol use: Yes     Comment: rare    Drug use: No    Sexual activity: Not Currently     Partners: Male     Birth control/protection: Post-menopausal, Hysterectomy     Comment: Hysterectomy       Family History   Problem Relation Age of Onset    Stroke Father     Colon cancer Paternal Grandfather     Colon cancer Paternal Aunt     Breast cancer Neg Hx     Ovarian cancer Neg Hx     Deep vein thrombosis Neg Hx     Pulmonary embolism Neg Hx        Objective     Vitals:    08/22/23 1342   BP: 138/82         08/22/23  1342   Weight: 74.8 kg (165 lb)     Body mass index is 26.63 kg/mý.    Physical Exam  Constitutional:       Appearance: Normal appearance.   HENT:      Head: Normocephalic and atraumatic.   Eyes:      Extraocular Movements: Extraocular movements intact.      Pupils: Pupils are equal, round, and reactive to light.   Cardiovascular:      Rate and Rhythm: Normal rate and regular rhythm.      Heart sounds: Normal heart sounds.   Pulmonary:      Effort: Pulmonary effort is normal.      Breath sounds: Normal breath sounds.   Abdominal:      General: Abdomen is flat. Bowel sounds are normal. There is no distension.      Palpations: Abdomen is soft.      Tenderness: There is abdominal tenderness (mild epigastric).   Neurological:      Mental Status: She is alert.   Psychiatric:         Mood and Affect: Mood normal.         Behavior: Behavior normal.       WBC   Date Value Ref Range Status   08/14/2023 5.07 3.40 - 10.80 10*3/mm3 Final   05/02/2023 6.24 3.40 - 10.80 10*3/mm3 Final     RBC   Date Value Ref Range Status   08/14/2023 4.87 3.77 - 5.28 10*6/mm3 Final   05/02/2023 4.97 3.77 - 5.28 10*6/mm3 Final     Hemoglobin    Date Value Ref Range Status   08/14/2023 14.2 12.0 - 15.9 g/dL Final     Hematocrit   Date Value Ref Range Status   08/14/2023 43.4 34.0 - 46.6 % Final     MCV   Date Value Ref Range Status   08/14/2023 89.1 79.0 - 97.0 fL Final     MCH   Date Value Ref Range Status   08/14/2023 29.2 26.6 - 33.0 pg Final     MCHC   Date Value Ref Range Status   08/14/2023 32.7 31.5 - 35.7 g/dL Final     RDW   Date Value Ref Range Status   08/14/2023 13.3 12.3 - 15.4 % Final     RDW-SD   Date Value Ref Range Status   08/14/2023 45.0 37.0 - 54.0 fl Final     MPV   Date Value Ref Range Status   08/14/2023 8.9 6.0 - 12.0 fL Final     Platelets   Date Value Ref Range Status   08/14/2023 299 140 - 450 10*3/mm3 Final     Neutrophil %   Date Value Ref Range Status   08/14/2023 56.8 42.7 - 76.0 % Final     Lymphocyte %   Date Value Ref Range Status   08/14/2023 34.5 19.6 - 45.3 % Final     Monocyte %   Date Value Ref Range Status   08/14/2023 7.5 5.0 - 12.0 % Final     Eosinophil %   Date Value Ref Range Status   08/14/2023 1.0 0.3 - 6.2 % Final     Basophil %   Date Value Ref Range Status   08/14/2023 0.2 0.0 - 1.5 % Final     Immature Grans %   Date Value Ref Range Status   08/14/2023 0.0 0.0 - 0.5 % Final     Neutrophils, Absolute   Date Value Ref Range Status   08/14/2023 2.88 1.70 - 7.00 10*3/mm3 Final     Lymphocytes, Absolute   Date Value Ref Range Status   08/14/2023 1.75 0.70 - 3.10 10*3/mm3 Final     Monocytes, Absolute   Date Value Ref Range Status   08/14/2023 0.38 0.10 - 0.90 10*3/mm3 Final     Eosinophils, Absolute   Date Value Ref Range Status   08/14/2023 0.05 0.00 - 0.40 10*3/mm3 Final     Basophils, Absolute   Date Value Ref Range Status   08/14/2023 0.01 0.00 - 0.20 10*3/mm3 Final     Immature Grans, Absolute   Date Value Ref Range Status   08/14/2023 0.00 0.00 - 0.05 10*3/mm3 Final     nRBC   Date Value Ref Range Status   05/02/2023 0.0 0.0 - 0.2 /100 WBC Final       Glucose   Date Value Ref Range Status   08/14/2023  113 (H) 65 - 99 mg/dL Final     Sodium   Date Value Ref Range Status   08/14/2023 139 136 - 145 mmol/L Final     Potassium   Date Value Ref Range Status   08/14/2023 3.9 3.5 - 5.2 mmol/L Final     CO2   Date Value Ref Range Status   08/14/2023 28.9 22.0 - 29.0 mmol/L Final     Chloride   Date Value Ref Range Status   08/14/2023 101 98 - 107 mmol/L Final     Anion Gap   Date Value Ref Range Status   08/14/2023 9.1 5.0 - 15.0 mmol/L Final     Creatinine   Date Value Ref Range Status   08/14/2023 0.77 0.57 - 1.00 mg/dL Final     BUN   Date Value Ref Range Status   08/14/2023 14 8 - 23 mg/dL Final     BUN/Creatinine Ratio   Date Value Ref Range Status   08/14/2023 18.2 7.0 - 25.0 Final     Calcium   Date Value Ref Range Status   08/14/2023 10.2 8.6 - 10.5 mg/dL Final     eGFR Non  Am   Date Value Ref Range Status   02/22/2022 77 >59 mL/min/1.73 Final     Alkaline Phosphatase   Date Value Ref Range Status   08/14/2023 62 39 - 117 U/L Final     Total Protein   Date Value Ref Range Status   08/14/2023 7.4 6.0 - 8.5 g/dL Final     ALT (SGPT)   Date Value Ref Range Status   08/14/2023 20 1 - 33 U/L Final     AST (SGOT)   Date Value Ref Range Status   08/14/2023 15 1 - 32 U/L Final     Total Bilirubin   Date Value Ref Range Status   08/14/2023 0.3 0.0 - 1.2 mg/dL Final     Albumin   Date Value Ref Range Status   08/14/2023 4.4 3.5 - 5.2 g/dL Final     Globulin   Date Value Ref Range Status   08/14/2023 3.0 gm/dL Final     A/G Ratio   Date Value Ref Range Status   05/02/2023 1.8 g/dL Final         Imaging Results (Last 7 Days)       ** No results found for the last 168 hours. **                   Assessment & Plan     Diagnoses and all orders for this visit:    1. Dyspepsia (Primary)  Assessment & Plan:  - Increase to Omeprazole 40 mg BID AC   - EGD to further evaluate     Orders:  -     omeprazole (priLOSEC) 40 MG capsule; Take 1 capsule by mouth 2 (Two) Times a Day Before Meals.  Dispense: 180 capsule; Refill: 3  -      Case Request; Standing  -     Obtain Informed Consent; Standing  -     Case Request    2. Gastroesophageal reflux disease, unspecified whether esophagitis present  Assessment & Plan:  - Increase to Omeprazole 40 mg BID AC.  - Start PRN Gaviscon    - EGD to further evaluate     Orders:  -     omeprazole (priLOSEC) 40 MG capsule; Take 1 capsule by mouth 2 (Two) Times a Day Before Meals.  Dispense: 180 capsule; Refill: 3  -     Alum Hydroxide-Mag Carbonate (Gaviscon Extra Strength) 508-475 MG/10ML suspension; Take 20 mL by mouth 4 (Four) Times a Day As Needed (heartburn).  Dispense: 355 mL; Refill: 11  -     Case Request; Standing  -     Obtain Informed Consent; Standing  -     Case Request    3. Irritable bowel syndrome with constipation  Assessment & Plan:  - Start Trulance 3 mg QD     Orders:  -     Plecanatide (Trulance) 3 MG tablet; Take 1 tablet by mouth Daily.  Dispense: 30 tablet; Refill: 11    4. Personal history of colonic polyps  Assessment & Plan:  - Surveillance colonoscopy due in 8/2032.           I have discussed the above plan with the patient.  They verbalize understanding and are in agreement with the plan.  They have been advised to contact the office for any questions, concerns, or changes related to their health.

## 2023-08-23 NOTE — TELEPHONE ENCOUNTER
HUB TO READ    In 2020 I placed a referral for her to see rheumatology due to elevated CRP.  I asked that she see Dr. Lizzy Monae.  Did she establish with Dr. Monae?

## 2023-09-12 ENCOUNTER — OFFICE VISIT (OUTPATIENT)
Dept: FAMILY MEDICINE CLINIC | Facility: CLINIC | Age: 58
End: 2023-09-12
Payer: COMMERCIAL

## 2023-09-12 VITALS
HEIGHT: 66 IN | WEIGHT: 162 LBS | TEMPERATURE: 97.5 F | BODY MASS INDEX: 26.03 KG/M2 | SYSTOLIC BLOOD PRESSURE: 128 MMHG | RESPIRATION RATE: 18 BRPM | OXYGEN SATURATION: 97 % | DIASTOLIC BLOOD PRESSURE: 80 MMHG | HEART RATE: 99 BPM

## 2023-09-12 DIAGNOSIS — I10 ESSENTIAL HYPERTENSION: ICD-10-CM

## 2023-09-12 DIAGNOSIS — R73.9 HYPERGLYCEMIA: ICD-10-CM

## 2023-09-12 DIAGNOSIS — R10.13 POSTPRANDIAL EPIGASTRIC PAIN: Primary | ICD-10-CM

## 2023-09-12 DIAGNOSIS — E78.2 MIXED HYPERLIPIDEMIA: ICD-10-CM

## 2023-09-12 PROCEDURE — 99214 OFFICE O/P EST MOD 30 MIN: CPT | Performed by: INTERNAL MEDICINE

## 2023-09-12 RX ORDER — TRAZODONE HYDROCHLORIDE 50 MG/1
50 TABLET ORAL NIGHTLY PRN
Qty: 90 TABLET | Refills: 1 | Status: SHIPPED | OUTPATIENT
Start: 2023-09-12

## 2023-09-12 RX ORDER — ATORVASTATIN CALCIUM 20 MG/1
20 TABLET, FILM COATED ORAL DAILY
Qty: 90 TABLET | Refills: 1 | Status: SHIPPED | OUTPATIENT
Start: 2023-09-12

## 2023-09-12 RX ORDER — LISINOPRIL 30 MG/1
30 TABLET ORAL DAILY
Qty: 90 TABLET | Refills: 1 | Status: SHIPPED | OUTPATIENT
Start: 2023-09-12

## 2023-09-12 NOTE — PROGRESS NOTES
"Chief Complaint  Med Refill (Pt here for med refills ) and GI Problem (Pt here to discuss gastro issues )    Subjective        Julisa Nixon presents to [facility]  GI Problem    She is here today with .  A few weeks ago went to ED due to left arm and burning chest pain.  She ruled out for MI after EKG and labs and has cardiology f/u with Dr. Guillen in October.  She was given 2 NTG and after the second NTG her BP dropped to 63/45 and she felt syncopal while in ED.  The responded with IVF's and she normalized.  Once she got home,  she feels like she only wanted to eat bread and potatoes b/c of stomach and chest burning.  She then made an appt with Dr. Eric HURLEY in [place].  She has seen him and he dx with GERD.  And he has scheduled an EGD next week.  She is on omeprazole 40 mg bid and sx have improved.  She traveled to [place] to see their daughter and she was feeling so well she decreased to daily omeprazole and she started eating normally.   Therefore she stopped omeprazole Sunday and after drinking a chocolate protein shake, the burning returned and her BP increased to 160/107.  She restarted it at BID now and will await EGD.  She isn't drinking alcohol but this all started when she had company a few weeks ago and was stress eating..  her daughter has had GB removed and wondering if these sx could be GB related.    Has continued to lose weight a few pounds.  She was on keto and lost a lot of weight with keto diet.   She wants to start PT for right SI joint pain that hurts with prolonged walking  She is going to see Dr. Monae for rheumatology b/c of her arthralgias.      Objective   Vital Signs:  /80   Pulse 99   Temp 97.5 °F (36.4 °C)   Resp 18   Ht 167.6 cm (66\")   Wt 73.5 kg (162 lb)   SpO2 97%   BMI 26.15 kg/m²   Estimated body mass index is 26.15 kg/m² as calculated from the following:    Height as of this encounter: 167.6 cm (66\").    Weight as of " this encounter: 73.5 kg (162 lb).       BMI is >= 25 and <30. (Overweight) The following options were offered after discussion;: exercise counseling/recommendations and nutrition counseling/recommendations      Physical Exam  Vitals and nursing note reviewed.   Constitutional:       Appearance: Normal appearance. She is well-developed.   HENT:      Head: Normocephalic and atraumatic.      Right Ear: External ear normal.      Left Ear: External ear normal.   Eyes:      Extraocular Movements: Extraocular movements intact.      Conjunctiva/sclera: Conjunctivae normal.   Neck:      Vascular: No carotid bruit.   Cardiovascular:      Rate and Rhythm: Normal rate and regular rhythm.      Heart sounds: Normal heart sounds.      Comments: No bruits  Pulmonary:      Effort: Pulmonary effort is normal. No respiratory distress.      Breath sounds: Normal breath sounds. No stridor. No wheezing, rhonchi or rales.   Chest:      Chest wall: No tenderness.   Abdominal:      General: Bowel sounds are normal. There is no distension.      Palpations: Abdomen is soft. There is no mass.      Tenderness: There is no abdominal tenderness. There is no guarding or rebound.      Hernia: No hernia is present.   Musculoskeletal:      Cervical back: Neck supple.   Lymphadenopathy:      Cervical: No cervical adenopathy.   Skin:     General: Skin is warm.   Neurological:      Mental Status: She is alert and oriented to person, place, and time. Mental status is at baseline.   Psychiatric:         Mood and Affect: Mood normal.         Behavior: Behavior normal.         Thought Content: Thought content normal.         Judgment: Judgment normal.      Result Review :                   Assessment and Plan   Diagnoses and all orders for this visit:    1. Postprandial epigastric pain (Primary)  -     US Gallbladder; Future    2. Hyperglycemia  -     Hemoglobin A1c    3. Essential hypertension  -     Hemoglobin A1c  -     Comprehensive Metabolic Panel  -      Lipid Panel With LDL / HDL Ratio  -     TSH  -     T4, Free    4. Mixed hyperlipidemia  -     Hemoglobin A1c  -     Comprehensive Metabolic Panel  -     Lipid Panel With LDL / HDL Ratio  -     TSH  -     T4, Free    Other orders  -     atorvastatin (LIPITOR) 20 MG tablet; Take 1 tablet by mouth Daily.  Dispense: 90 tablet; Refill: 1  -     lisinopril (PRINIVIL,ZESTRIL) 30 MG tablet; Take 1 tablet by mouth Daily.  Dispense: 90 tablet; Refill: 1  -     traZODone (DESYREL) 50 MG tablet; Take 1 tablet by mouth At Night As Needed for Sleep.  Dispense: 90 tablet; Refill: 1      Patient will follow-up with her rheumatologist due to polyarthralgia.  She is going to set her self up for physical therapy to help with her right SI joint inflammation.  Fasting labs have been ordered today.  Refill Lipitor for hyperlipidemia.  Refill lisinopril 30 mg daily for hypertension.  Blood pressure is well controlled today.  When she is anxious, she does see an elevation in her blood pressure but in general, her blood pressure is well controlled.  Refill trazodone 50 mg that she takes as needed for insomnia.  Continue to eat low-carb low-fat diet to help prevent diabetes and to treat cholesterol.  I do think it is possible that some of her burning in the abdomen and chest that occurs after eating could be gallbladder related.  I am glad she is seeing Dr. Reyes and has an upcoming EGD.  We will order a gallbladder ultrasound to rule out gallstones or gallbladder sludge.       Follow Up   Return in about 6 months (around 3/12/2024) for Recheck.  Patient was given instructions and counseling regarding her condition or for health maintenance advice. Please see specific information pulled into the AVS if appropriate.

## 2023-09-13 ENCOUNTER — TELEPHONE (OUTPATIENT)
Dept: FAMILY MEDICINE CLINIC | Facility: CLINIC | Age: 58
End: 2023-09-13

## 2023-09-13 LAB
ALBUMIN SERPL-MCNC: 4.6 G/DL (ref 3.5–5.2)
ALBUMIN/GLOB SERPL: 1.6 G/DL
ALP SERPL-CCNC: 68 U/L (ref 39–117)
ALT SERPL-CCNC: 31 U/L (ref 1–33)
AST SERPL-CCNC: 25 U/L (ref 1–32)
BILIRUB SERPL-MCNC: 0.6 MG/DL (ref 0–1.2)
BUN SERPL-MCNC: 11 MG/DL (ref 8–23)
BUN/CREAT SERPL: 12.5 (ref 7–25)
CALCIUM SERPL-MCNC: 9.7 MG/DL (ref 8.6–10.5)
CHLORIDE SERPL-SCNC: 102 MMOL/L (ref 98–107)
CHOLEST SERPL-MCNC: 225 MG/DL (ref 0–200)
CO2 SERPL-SCNC: 27.5 MMOL/L (ref 22–29)
CREAT SERPL-MCNC: 0.88 MG/DL (ref 0.57–1)
EGFRCR SERPLBLD CKD-EPI 2021: 75.3 ML/MIN/1.73
GLOBULIN SER CALC-MCNC: 2.8 GM/DL
GLUCOSE SERPL-MCNC: 101 MG/DL (ref 65–99)
HBA1C MFR BLD: 6.1 % (ref 4.8–5.6)
HDLC SERPL-MCNC: 56 MG/DL (ref 40–60)
LDLC SERPL CALC-MCNC: 147 MG/DL (ref 0–100)
LDLC/HDLC SERPL: 2.57 {RATIO}
POTASSIUM SERPL-SCNC: 4.3 MMOL/L (ref 3.5–5.2)
PROT SERPL-MCNC: 7.4 G/DL (ref 6–8.5)
SODIUM SERPL-SCNC: 144 MMOL/L (ref 136–145)
T4 FREE SERPL-MCNC: 1.25 NG/DL (ref 0.93–1.7)
TRIGL SERPL-MCNC: 126 MG/DL (ref 0–150)
TSH SERPL DL<=0.005 MIU/L-ACNC: 2.35 UIU/ML (ref 0.27–4.2)
VLDLC SERPL CALC-MCNC: 22 MG/DL (ref 5–40)

## 2023-09-13 NOTE — TELEPHONE ENCOUNTER
Caller: Julisa Nixon    Relationship: Self    Best call back number: [phone]    What specialty or service is being requested: RHEUMATOLOGY     What is the provider, practice or medical service name: DR DENNIS    Any additional details: PATIENT STATED SHE CONTACTED DR ELIZABETH OFF[facility] TO SET UP AN APPOINTMENT, AND THEY ADVISED HER THAT SHE NEEDS A NEW REFERRAL SENT BECAUSE IT HAS BEEN SO LONG.    PLEASE CALL WHEN DONE.

## 2023-09-15 DIAGNOSIS — M79.10 MYALGIA: ICD-10-CM

## 2023-09-15 DIAGNOSIS — M19.90 ARTHRITIS: Primary | ICD-10-CM

## 2023-09-22 ENCOUNTER — ANESTHESIA (OUTPATIENT)
Dept: SURGERY | Facility: SURGERY CENTER | Age: 58
End: 2023-09-22
Payer: COMMERCIAL

## 2023-09-22 ENCOUNTER — ANESTHESIA EVENT (OUTPATIENT)
Dept: SURGERY | Facility: SURGERY CENTER | Age: 58
End: 2023-09-22
Payer: COMMERCIAL

## 2023-09-22 ENCOUNTER — HOSPITAL ENCOUNTER (OUTPATIENT)
Facility: SURGERY CENTER | Age: 58
Setting detail: HOSPITAL OUTPATIENT SURGERY
Discharge: HOME | End: 2023-09-22
Attending: STUDENT IN AN ORGANIZED HEALTH CARE EDUCATION/TRAINING PROGRAM | Admitting: STUDENT IN AN ORGANIZED HEALTH CARE EDUCATION/TRAINING PROGRAM
Payer: COMMERCIAL

## 2023-09-22 VITALS
SYSTOLIC BLOOD PRESSURE: 131 MMHG | WEIGHT: 159.6 LBS | OXYGEN SATURATION: 94 % | TEMPERATURE: 97.3 F | HEIGHT: 66 IN | HEART RATE: 80 BPM | RESPIRATION RATE: 16 BRPM | DIASTOLIC BLOOD PRESSURE: 82 MMHG | BODY MASS INDEX: 25.65 KG/M2

## 2023-09-22 DIAGNOSIS — K21.9 GASTROESOPHAGEAL REFLUX DISEASE, UNSPECIFIED WHETHER ESOPHAGITIS PRESENT: ICD-10-CM

## 2023-09-22 DIAGNOSIS — R10.13 DYSPEPSIA: ICD-10-CM

## 2023-09-22 PROCEDURE — 88305 TISSUE EXAM BY PATHOLOGIST: CPT | Performed by: STUDENT IN AN ORGANIZED HEALTH CARE EDUCATION/TRAINING PROGRAM

## 2023-09-22 PROCEDURE — 25010000002 PROPOFOL 10 MG/ML EMULSION: Performed by: ANESTHESIOLOGY

## 2023-09-22 PROCEDURE — 25010000002 PROPOFOL 1000 MG/100ML EMULSION: Performed by: ANESTHESIOLOGY

## 2023-09-22 PROCEDURE — 43239 EGD BIOPSY SINGLE/MULTIPLE: CPT | Performed by: STUDENT IN AN ORGANIZED HEALTH CARE EDUCATION/TRAINING PROGRAM

## 2023-09-22 RX ORDER — SODIUM CHLORIDE, SODIUM LACTATE, POTASSIUM CHLORIDE, CALCIUM CHLORIDE 600; 310; 30; 20 MG/100ML; MG/100ML; MG/100ML; MG/100ML
1000 INJECTION, SOLUTION INTRAVENOUS CONTINUOUS
Status: DISCONTINUED | OUTPATIENT
Start: 2023-09-22 | End: 2023-09-22 | Stop reason: HOSPADM

## 2023-09-22 RX ORDER — PROPOFOL 10 MG/ML
INJECTION, EMULSION INTRAVENOUS AS NEEDED
Status: DISCONTINUED | OUTPATIENT
Start: 2023-09-22 | End: 2023-09-22 | Stop reason: SURG

## 2023-09-22 RX ORDER — LIDOCAINE HYDROCHLORIDE 20 MG/ML
INJECTION, SOLUTION INFILTRATION; PERINEURAL AS NEEDED
Status: DISCONTINUED | OUTPATIENT
Start: 2023-09-22 | End: 2023-09-22 | Stop reason: SURG

## 2023-09-22 RX ORDER — SODIUM CHLORIDE 0.9 % (FLUSH) 0.9 %
10 SYRINGE (ML) INJECTION AS NEEDED
Status: DISCONTINUED | OUTPATIENT
Start: 2023-09-22 | End: 2023-09-22 | Stop reason: HOSPADM

## 2023-09-22 RX ORDER — LIDOCAINE HYDROCHLORIDE 10 MG/ML
0.5 INJECTION, SOLUTION INFILTRATION; PERINEURAL ONCE AS NEEDED
Status: DISCONTINUED | OUTPATIENT
Start: 2023-09-22 | End: 2023-09-22 | Stop reason: HOSPADM

## 2023-09-22 RX ADMIN — PROPOFOL 150 MG: 10 INJECTION, EMULSION INTRAVENOUS at 08:03

## 2023-09-22 RX ADMIN — PROPOFOL 200 MCG/KG/MIN: 10 INJECTION, EMULSION INTRAVENOUS at 08:03

## 2023-09-22 RX ADMIN — LIDOCAINE HYDROCHLORIDE 50 MG: 20 INJECTION, SOLUTION INFILTRATION; PERINEURAL at 08:03

## 2023-09-22 RX ADMIN — SODIUM CHLORIDE, SODIUM LACTATE, POTASSIUM CHLORIDE, AND CALCIUM CHLORIDE: .6; .31; .03; .02 INJECTION, SOLUTION INTRAVENOUS at 07:59

## 2023-09-22 NOTE — ANESTHESIA PREPROCEDURE EVALUATION
Anesthesia Evaluation     Patient summary reviewed   history of anesthetic complications:  PONV  NPO Solid Status: > 8 hours  NPO Liquid Status: > 2 hours           Airway   Mallampati: II  TM distance: >3 FB  Neck ROM: full  No difficulty expected  Dental - normal exam     Pulmonary     breath sounds clear to auscultation  (-) shortness of breath, recent URI, not a smoker  Cardiovascular   Exercise tolerance: good (4-7 METS)    ECG reviewed  Rhythm: regular  Rate: normal    (+) hypertension, hyperlipidemia  (-) past MI, CAD, dysrhythmias, angina, BETHEA, murmur      Neuro/Psych  (-) seizures, CVA  GI/Hepatic/Renal/Endo    (+) GERD, diabetes mellitus (hyperglycemia; last HbA1C 6.1)  (-)  obesity, no renal disease    Musculoskeletal     (+) neck pain  (-) neck stiffness  Abdominal    Substance History      OB/GYN          Other                        Anesthesia Plan    ASA 2     MAC     (MAC anesthesia discussed with patient and/or patient representative. Risks (including but not limited to intra-op awareness), benefits, and alternatives were discussed. Understanding was voiced with an agreement to proceed with a MAC technique and General as a backup option. )    Anesthetic plan, risks, benefits, and alternatives have been provided, discussed and informed consent has been obtained with: patient.      CODE STATUS:

## 2023-09-22 NOTE — ANESTHESIA POSTPROCEDURE EVALUATION
"Patient: Julisa Nixon    Procedure Summary       Date: 09/22/23 Room / Location: [facility]    Anesthesia Start: 0758 Anesthesia Stop: 0813    Procedure: ESOPHAGOGASTRODUODENOSCOPY Diagnosis:       Dyspepsia      Gastroesophageal reflux disease, unspecified whether esophagitis present      (Dyspepsia [R10.13])      (Gastroesophageal reflux disease, unspecified whether esophagitis present [K21.9])    Surgeons: Smith Thapa MD Provider: Parveen Machuca DO    Anesthesia Type: MAC ASA Status: 2            Anesthesia Type: MAC    Vitals  Vitals Value Taken Time   /82 09/22/23 0830   Temp 36.3 °C (97.3 °F) 09/22/23 0815   Pulse 80 09/22/23 0830   Resp 16 09/22/23 0830   SpO2 94 % 09/22/23 0830           Post Anesthesia Care and Evaluation    Patient location during evaluation: bedside  Patient participation: complete - patient participated  Level of consciousness: awake and alert  Pain management: adequate    Airway patency: patent  Anesthetic complications: No anesthetic complications  PONV Status: controlled  Cardiovascular status: acceptable and hemodynamically stable  Respiratory status: acceptable, spontaneous ventilation and nonlabored ventilation  Hydration status: acceptable    Comments: /82 (BP Location: Left arm, Patient Position: Lying)   Pulse 80   Temp 36.3 °C (97.3 °F) (Temporal)   Resp 16   Ht 167.6 cm (66\")   Wt 72.4 kg (159 lb 9.6 oz)   SpO2 94%   BMI 25.76 kg/m²       "

## 2023-09-22 NOTE — H&P
Patient Care Team:  Lupe Stewart MD as PCP - General  Lupe Stewart MD as PCP - Family Medicine    CHIEF COMPLAINT: dyspepsia and GERD    HISTORY OF PRESENT ILLNESS:  For dyspepsia and GERD. Increased to Omeprazole 40 mg BID and started on PRN Gaviscon.     Past Medical History:   Diagnosis Date    Hyperlipidemia     Hypertension     PONV (postoperative nausea and vomiting)     Rheumatic fever     and     Shoulder pain, right      Past Surgical History:   Procedure Laterality Date    BREAST BIOPSY       SECTION      COLONOSCOPY  2014    KALIE SILVESTRE MD    COLONOSCOPY N/A 2022    Procedure: COLONOSCOPY with Polypectomy;  Surgeon: Alex Guerrero MD;  [place] [facility] [place]  Service: Gastroenterology;  Laterality: N/A;  Sigmoid polyp, Rectal polyp x2    HYSTERECTOMY      TLH with OC age 44 for DUB    VAGINAL HYSTERECTOMY       Family History   Problem Relation Age of Onset    Stroke Father     Colon cancer Paternal Grandfather     Colon cancer Paternal Aunt     Breast cancer Neg Hx     Ovarian cancer Neg Hx     Deep vein thrombosis Neg Hx     Pulmonary embolism Neg Hx      Social History     Tobacco Use    Smoking status: Never    Smokeless tobacco: Never   Vaping Use    Vaping Use: Never used   Substance Use Topics    Alcohol use: Yes     Comment: rare    Drug use: No     Medications Prior to Admission   Medication Sig Dispense Refill Last Dose    Alum Hydroxide-Mag Carbonate (Gaviscon Extra Strength) 508-475 MG/10ML suspension Take 20 mL by mouth 4 (Four) Times a Day As Needed (heartburn). 355 mL 11 2023    atorvastatin (LIPITOR) 20 MG tablet Take 1 tablet by mouth Daily. 90 tablet 1 Past Week    calcium carbonate (TUMS) 500 MG chewable tablet Chew 1 tablet As Needed for Indigestion or Heartburn.   Past Week    lisinopril (PRINIVIL,ZESTRIL) 30 MG tablet Take 1 tablet by mouth Daily. 90 tablet 1 2023    omeprazole (priLOSEC) 40 MG capsule Take 1 capsule by  "mouth 2 (Two) Times a Day Before Meals. 180 capsule 3 9/21/2023    traZODone (DESYREL) 50 MG tablet Take 1 tablet by mouth At Night As Needed for Sleep. 90 tablet 1 Past Week    Plecanatide (Trulance) 3 MG tablet Take 1 tablet by mouth Daily. (Patient taking differently: Take 1 tablet by mouth Daily. Not started) 30 tablet 11 Unknown     Allergies:  Patient has no known allergies.    REVIEW OF SYSTEMS:  Please see the above history of present illness for pertinent positives and negatives.  The remainder of the patient's systems have been reviewed and are negative.     Vital Signs  Temp:  [97.8 °F (36.6 °C)] 97.8 °F (36.6 °C)  Heart Rate:  [98] 98  Resp:  [18] 18  BP: (126)/(48) 126/48    Flowsheet Rows      Flowsheet Row First Filed Value   Admission Height 167.6 cm (66\") Documented at 09/20/2023 1050   Admission Weight 71.2 kg (157 lb) Documented at 09/20/2023 1050             Physical Exam:  Physical Exam   Constitutional: Patient appears well-developed and well-nourished and in no acute distress   HEENT:   Head: Normocephalic and atraumatic.   Eyes:  Pupils are equal, round, and reactive to light. EOM are intact. Sclerae are anicteric and non-injected.  Mouth and Throat: Patient has moist mucous membranes. Oropharynx is clear of any erythema or exudate.     Neck: Neck supple. No JVD present. No thyromegaly present. No lymphadenopathy present.  Cardiovascular: Regular rate, regular rhythm, S1 normal and S2 normal.  Exam reveals no gallop and no friction rub.  No murmur heard.  Pulmonary/Chest: Lungs are clear to auscultation bilaterally. No respiratory distress. No wheezes. No rhonchi. No rales.   Abdominal: Soft. Bowel sounds are normal. No distension and no mass. There is no hepatosplenomegaly. There is no tenderness.   Musculoskeletal: Normal Muscle tone  Extremities: No edema. Pulses are palpable in all 4 extremities.  Neurological: Patient is alert and oriented to person, place, and time. Cranial nerves " II-XII are grossly intact with no focal deficits.  Skin: Skin is warm. No rash noted. Nails show no clubbing.  No cyanosis or erythema.    Debilities/Disabilities Identified: None  Emotional Behavior: Appropriate     Results Review:   I reviewed the patient's new clinical results.    Lab Results (most recent)       None            Imaging Results (Most Recent)       None          reviewed    ECG/EMG Results (most recent)       None          reviewed    Assessment & Plan   dyspepsia/  EGD      I discussed the patient's findings and my recommendations with patient.     Smith Thapa MD  09/22/23  08:02 EDT    Time: 10 min prior to procedure.

## 2023-09-25 LAB
LAB AP CASE REPORT: NORMAL
LAB AP CLINICAL INFORMATION: NORMAL
PATH REPORT.FINAL DX SPEC: NORMAL
PATH REPORT.GROSS SPEC: NORMAL

## 2023-09-26 ENCOUNTER — HOSPITAL ENCOUNTER (OUTPATIENT)
Dept: ULTRASOUND IMAGING | Facility: HOSPITAL | Age: 58
Discharge: HOME | End: 2023-09-26
Admitting: INTERNAL MEDICINE
Payer: COMMERCIAL

## 2023-09-26 DIAGNOSIS — R10.13 POSTPRANDIAL EPIGASTRIC PAIN: ICD-10-CM

## 2023-09-26 PROCEDURE — 76705 ECHO EXAM OF ABDOMEN: CPT

## 2023-09-28 RX ORDER — METFORMIN HYDROCHLORIDE 500 MG/1
500 TABLET, EXTENDED RELEASE ORAL
Qty: 90 TABLET | Refills: 0 | Status: SHIPPED | OUTPATIENT
Start: 2023-09-28

## 2023-09-30 NOTE — PROGRESS NOTES
- EGD for heartburn and dyspepsia.   - Findings/path: grade A esophagitis, gastritis s/p bx negative for H pylori   - POC: Etiology of heartburn is likely secondary to grade A esophagitis seen. Continue Omeprazole 40 mg QD and  PRN Gaviscon. No gastritis or duodenitis seen to explain dyspepsia.

## 2023-10-05 ENCOUNTER — TELEPHONE (OUTPATIENT)
Dept: GASTROENTEROLOGY | Facility: CLINIC | Age: 58
End: 2023-10-05

## 2023-10-05 NOTE — TELEPHONE ENCOUNTER
Provider: DR CRUZ    Caller: GWENDOLYN CHAPMAN    Relationship to Patient: SELF    Pharmacy: [facility]    Phone Number: [phone]    Reason for Call: PT CALLED TO FIND OUT WHAT LIQUID PROTEIN OR VITAMIN DRINK SHE CAN TAKE THAT IS NOT HARD ON HER STOMACH. PT HAS BEEN FEELING WEAK AND HAS HAD WEIGHT LOSS AND WOULD LIKE TO BUILD HERSELF BACK UP.    PLEASE CALL PT TO DISCUSS.

## 2023-10-06 DIAGNOSIS — R73.03 PREDIABETES: ICD-10-CM

## 2023-10-06 DIAGNOSIS — R10.13 POSTPRANDIAL EPIGASTRIC PAIN: ICD-10-CM

## 2023-10-06 DIAGNOSIS — R73.9 HYPERGLYCEMIA: Primary | ICD-10-CM

## 2023-10-06 NOTE — TELEPHONE ENCOUNTER
Miss Nixon can take Pedialyte which will help replete her electrolytes. Otherwise, a great source of protein would be ensure or boost shakes.

## 2023-10-12 ENCOUNTER — PATIENT MESSAGE (OUTPATIENT)
Dept: FAMILY MEDICINE CLINIC | Facility: CLINIC | Age: 58
End: 2023-10-12
Payer: COMMERCIAL

## 2023-10-13 NOTE — TELEPHONE ENCOUNTER
From: Julisa Nixon  To: Lupe Stewart  Sent: 10/12/2023 11:59 PM EDT  Subject: Dietitian     I am just following up on when my appointment will be with the dietitian you referred me to or a phone number of the dietitian office that I can call. Thank you!

## 2023-10-24 ENCOUNTER — HOSPITAL ENCOUNTER (OUTPATIENT)
Dept: DIABETES SERVICES | Facility: HOSPITAL | Age: 58
Discharge: HOME | End: 2023-10-24
Admitting: INTERNAL MEDICINE
Payer: COMMERCIAL

## 2023-10-24 PROCEDURE — 97802 MEDICAL NUTRITION INDIV IN: CPT

## 2023-10-24 NOTE — CONSULTS
Julisa was seen today by Registered Dietitian for Diabetes Education. Consistent with the ADA’s standards of care, a comprehensive assessment/training record has been sent to medical records (see media tab).    Lost ~20# related to GI issues. Reports weight has stabilized. Was unable to tolerate metformin. PreDM discussed. Consistent carb/carb counting eating pattern discussed. Encouraged limiting saturated fats. Labels, portions, meal planning reviewed.     Julisa has been encouraged to call our office with questions or additional education needs. Please place referral for additional services or follow-up should need arise.    Thank you for the referral.

## 2023-11-18 ENCOUNTER — HOSPITAL ENCOUNTER (OUTPATIENT)
Dept: BONE DENSITY | Facility: HOSPITAL | Age: 58
Discharge: HOME | End: 2023-11-18
Admitting: OBSTETRICS & GYNECOLOGY
Payer: COMMERCIAL

## 2023-11-18 DIAGNOSIS — Z13.820 SCREENING FOR OSTEOPOROSIS: ICD-10-CM

## 2023-11-18 PROCEDURE — 77080 DXA BONE DENSITY AXIAL: CPT

## 2024-01-11 PROBLEM — K21.00 GASTROESOPHAGEAL REFLUX DISEASE WITH ESOPHAGITIS WITHOUT HEMORRHAGE: Status: ACTIVE | Noted: 2023-08-22

## 2024-01-12 ENCOUNTER — OFFICE VISIT (OUTPATIENT)
Dept: GASTROENTEROLOGY | Facility: CLINIC | Age: 59
End: 2024-01-12
Payer: COMMERCIAL

## 2024-01-12 VITALS
WEIGHT: 160 LBS | DIASTOLIC BLOOD PRESSURE: 82 MMHG | HEIGHT: 66 IN | SYSTOLIC BLOOD PRESSURE: 132 MMHG | BODY MASS INDEX: 25.71 KG/M2

## 2024-01-12 DIAGNOSIS — R10.13 DYSPEPSIA: ICD-10-CM

## 2024-01-12 DIAGNOSIS — K58.1 IRRITABLE BOWEL SYNDROME WITH CONSTIPATION: ICD-10-CM

## 2024-01-12 DIAGNOSIS — K21.00 GASTROESOPHAGEAL REFLUX DISEASE WITH ESOPHAGITIS WITHOUT HEMORRHAGE: Primary | ICD-10-CM

## 2024-01-12 NOTE — ASSESSMENT & PLAN NOTE
- Controlled through diet with increased fiber intake.   - DC Trulance as she reports she no longer uses it

## 2024-01-12 NOTE — PROGRESS NOTES
Julisa Nixon is a 58 y.o. female with PMH of GERD and IBS-C who presents with   Chief Complaint   Patient presents with    Follow-up     Gastritis/GERD recent EGD       Subjective     # GERD   # Dyspepsia   - Following her last appointment in 8/2023, was increased to Omeprazole 40 mg BID and started on PRN Gaviscon.   - EGD in 9/2023 showed grade A esophagitis, normal stomach (biopsied -- negative for H Pylori).   - Reports adherence to Omeprazole 40 mg BID. Denies taking PRN Gaviscon.   - Reports her dyspepsia and GERD have resolved with PPI use and improved diet.      # IBS-C   - Started on Trulance 3 mg QD following her last appointment but she is not taking the medication as her constipation is well controlled through increasing her dietary fiber intake.   - Reports having one BM daily now.                      Past Medical History:   Diagnosis Date    Hyperlipidemia     Hypertension     PONV (postoperative nausea and vomiting)     Rheumatic fever 1970    and 1972    Shoulder pain, right        Social History     Socioeconomic History    Marital status:    Tobacco Use    Smoking status: Never    Smokeless tobacco: Never   Vaping Use    Vaping Use: Never used   Substance and Sexual Activity    Alcohol use: Yes     Comment: rare    Drug use: No    Sexual activity: Not Currently     Partners: Male     Birth control/protection: Post-menopausal, Hysterectomy     Comment: Hysterectomy         Current Outpatient Medications:     atorvastatin (LIPITOR) 20 MG tablet, Take 1 tablet by mouth Daily., Disp: 90 tablet, Rfl: 1    calcium carbonate (TUMS) 500 MG chewable tablet, Chew 1 tablet As Needed for Indigestion or Heartburn., Disp: , Rfl:     lisinopril (PRINIVIL,ZESTRIL) 30 MG tablet, Take 1 tablet by mouth Daily., Disp: 90 tablet, Rfl: 1    omeprazole (priLOSEC) 40 MG capsule, Take 1 capsule by mouth 2 (Two) Times a Day Before Meals., Disp: 180 capsule, Rfl: 3    traZODone (DESYREL) 50 MG tablet, Take 1  tablet by mouth At Night As Needed for Sleep. (Patient not taking: Reported on 1/12/2024), Disp: 90 tablet, Rfl: 1    Objective   Vitals:    01/12/24 1228   BP: 132/82         01/12/24  1228   Weight: 72.6 kg (160 lb)     Body mass index is 25.84 kg/m².      Physical Exam  Constitutional:       Appearance: Normal appearance.   HENT:      Head: Normocephalic and atraumatic.   Eyes:      Extraocular Movements: Extraocular movements intact.      Conjunctiva/sclera: Conjunctivae normal.   Cardiovascular:      Rate and Rhythm: Normal rate and regular rhythm.      Heart sounds: Normal heart sounds.   Pulmonary:      Effort: Pulmonary effort is normal.      Breath sounds: Normal breath sounds.   Abdominal:      General: Abdomen is flat. Bowel sounds are normal. There is no distension.      Palpations: Abdomen is soft.      Tenderness: There is no abdominal tenderness.   Neurological:      Mental Status: She is alert.   Psychiatric:         Mood and Affect: Mood normal.         Behavior: Behavior normal.         WBC   Date Value Ref Range Status   08/14/2023 5.07 3.40 - 10.80 10*3/mm3 Final   05/02/2023 6.24 3.40 - 10.80 10*3/mm3 Final     RBC   Date Value Ref Range Status   08/14/2023 4.87 3.77 - 5.28 10*6/mm3 Final   05/02/2023 4.97 3.77 - 5.28 10*6/mm3 Final     Hemoglobin   Date Value Ref Range Status   08/14/2023 14.2 12.0 - 15.9 g/dL Final     Hematocrit   Date Value Ref Range Status   08/14/2023 43.4 34.0 - 46.6 % Final     MCV   Date Value Ref Range Status   08/14/2023 89.1 79.0 - 97.0 fL Final     MCH   Date Value Ref Range Status   08/14/2023 29.2 26.6 - 33.0 pg Final     MCHC   Date Value Ref Range Status   08/14/2023 32.7 31.5 - 35.7 g/dL Final     RDW   Date Value Ref Range Status   08/14/2023 13.3 12.3 - 15.4 % Final     RDW-SD   Date Value Ref Range Status   08/14/2023 45.0 37.0 - 54.0 fl Final     MPV   Date Value Ref Range Status   08/14/2023 8.9 6.0 - 12.0 fL Final     Platelets   Date Value Ref Range  Status   08/14/2023 299 140 - 450 10*3/mm3 Final     Neutrophil %   Date Value Ref Range Status   08/14/2023 56.8 42.7 - 76.0 % Final     Lymphocyte %   Date Value Ref Range Status   08/14/2023 34.5 19.6 - 45.3 % Final     Monocyte %   Date Value Ref Range Status   08/14/2023 7.5 5.0 - 12.0 % Final     Eosinophil %   Date Value Ref Range Status   08/14/2023 1.0 0.3 - 6.2 % Final     Basophil %   Date Value Ref Range Status   08/14/2023 0.2 0.0 - 1.5 % Final     Immature Grans %   Date Value Ref Range Status   08/14/2023 0.0 0.0 - 0.5 % Final     Neutrophils, Absolute   Date Value Ref Range Status   08/14/2023 2.88 1.70 - 7.00 10*3/mm3 Final     Lymphocytes, Absolute   Date Value Ref Range Status   08/14/2023 1.75 0.70 - 3.10 10*3/mm3 Final     Monocytes, Absolute   Date Value Ref Range Status   08/14/2023 0.38 0.10 - 0.90 10*3/mm3 Final     Eosinophils, Absolute   Date Value Ref Range Status   08/14/2023 0.05 0.00 - 0.40 10*3/mm3 Final     Basophils, Absolute   Date Value Ref Range Status   08/14/2023 0.01 0.00 - 0.20 10*3/mm3 Final     Immature Grans, Absolute   Date Value Ref Range Status   08/14/2023 0.00 0.00 - 0.05 10*3/mm3 Final     nRBC   Date Value Ref Range Status   05/02/2023 0.0 0.0 - 0.2 /100 WBC Final       Lab Results   Component Value Date    GLUCOSE 101 (H) 09/12/2023    BUN 11 09/12/2023    CREATININE 0.88 09/12/2023    EGFRIFNONA 77 02/22/2022    EGFRIFAFRI 89 02/22/2022    BCR 12.5 09/12/2023    CO2 27.5 09/12/2023    CALCIUM 9.7 09/12/2023    PROTENTOTREF 7.4 09/12/2023    ALBUMIN 4.6 09/12/2023    LABIL2 1.6 09/12/2023    AST 25 09/12/2023    ALT 31 09/12/2023         Imaging Results (Last 7 Days)       ** No results found for the last 168 hours. **              Assessment & Plan   Diagnoses and all orders for this visit:    1. Gastroesophageal reflux disease with esophagitis without hemorrhage (Primary)  Assessment & Plan:  - Controlled. Continue Omeprazole 40 mg BID.       2.  Dyspepsia  Assessment & Plan:  - Controlled. Continue Omeprazole 40 mg BID.       3. Irritable bowel syndrome with constipation  Assessment & Plan:  - Controlled through diet with increased fiber intake.   - DC Trulance as she reports she no longer uses it       RTC in 6 months     I have discussed the above plan with the patient.  They verbalize understanding and are in agreement with the plan.  They have been advised to contact the office for any questions, concerns, or changes related to their health.

## 2024-02-14 ENCOUNTER — OFFICE VISIT (OUTPATIENT)
Dept: OBSTETRICS AND GYNECOLOGY | Facility: CLINIC | Age: 59
End: 2024-02-14
Payer: COMMERCIAL

## 2024-02-14 VITALS
WEIGHT: 165.4 LBS | SYSTOLIC BLOOD PRESSURE: 122 MMHG | BODY MASS INDEX: 26.58 KG/M2 | HEIGHT: 66 IN | DIASTOLIC BLOOD PRESSURE: 88 MMHG

## 2024-02-14 DIAGNOSIS — Z12.31 ENCOUNTER FOR SCREENING MAMMOGRAM FOR MALIGNANT NEOPLASM OF BREAST: ICD-10-CM

## 2024-02-14 DIAGNOSIS — Z01.419 ROUTINE GYNECOLOGICAL EXAMINATION: Primary | ICD-10-CM

## 2024-02-14 PROCEDURE — 81002 URINALYSIS NONAUTO W/O SCOPE: CPT | Performed by: OBSTETRICS & GYNECOLOGY

## 2024-02-14 PROCEDURE — 99396 PREV VISIT EST AGE 40-64: CPT | Performed by: OBSTETRICS & GYNECOLOGY

## 2024-02-14 NOTE — PROGRESS NOTES
GYN Annual Exam     CC- Here for annual exam.     Julisa Nixon is a 58 y.o. female established patient who presents for annual well woman exam. NO  VB. She denies any issues with her bladder. She has been dx with esophagitis.   OB History          2    Para   2    Term   2            AB        Living   2         SAB        IAB        Ectopic        Molar        Multiple        Live Births              Obstetric Comments   2                Menarche:11  Menopause:44  HRT:none  Current contraception: status post hysterectomy- s/p TLH with OC for DUB age 44  History of abnormal Pap smear: no  History of abnormal mammogram: yes - B9 cyst bx  Family history of uterine, colon or ovarian cancer: yes - PGF and P aunt  colon  Family history of breast cancer: no  STD's: none  Last pap smear: 2023 nl x 2  VIOLETA: none      Health Maintenance   Topic Date Due    Pneumococcal Vaccine 0-64 (1 of 2 - PCV) Never done    ZOSTER VACCINE (1 of 2) Never done    HEPATITIS C SCREENING  Never done    RSV Vaccine - Adults (1 - 1-dose 60+ series) Never done    COVID-19 Vaccine (2023- season) 2023    ANNUAL PHYSICAL  2023    DIABETIC FOOT EXAM  2023    URINE MICROALBUMIN  2023    Annual Gynecologic Pelvic and Breast Exam  2024    HEMOGLOBIN A1C  2024    LIPID PANEL  2024    BMI FOLLOWUP  10/24/2024    DIABETIC EYE EXAM  2025    MAMMOGRAM  2025    PAP SMEAR  2026    TDAP/TD VACCINES (2 - Td or Tdap) 2028    COLORECTAL CANCER SCREENING  2032    INFLUENZA VACCINE  Completed       Past Medical History:   Diagnosis Date    Esophagitis     Hyperlipidemia     Hypertension     PONV (postoperative nausea and vomiting)     Rheumatic fever     and     Shoulder pain, right        Past Surgical History:   Procedure Laterality Date    BREAST BIOPSY       SECTION      COLONOSCOPY  2014    KALIE SILVESTRE MD    COLONOSCOPY N/A 2022     Procedure: COLONOSCOPY with Polypectomy;  Surgeon: Alex Guerrero MD;  Location: [facility];  Service: Gastroenterology;  Laterality: N/A;  Sigmoid polyp, Rectal polyp x2    ENDOSCOPY N/A 9/22/2023    Procedure: ESOPHAGOGASTRODUODENOSCOPY;  Surgeon: Smith Thapa MD;  Location: [facility];  Service: Gastroenterology;  Laterality: N/A;  esophagitis    HYSTERECTOMY      TLH with OC age 44 for DUB    VAGINAL HYSTERECTOMY           Current Outpatient Medications:     amitriptyline (ELAVIL) 10 MG tablet, Take 1 to 2 tablets at night for sleep, Disp: 30 tablet, Rfl: 0    atorvastatin (LIPITOR) 20 MG tablet, Take 1 tablet by mouth Daily., Disp: 90 tablet, Rfl: 1    calcium carbonate (TUMS) 500 MG chewable tablet, Chew 1 tablet As Needed for Indigestion or Heartburn., Disp: , Rfl:     lisinopril (PRINIVIL,ZESTRIL) 30 MG tablet, Take 1 tablet by mouth Daily., Disp: 90 tablet, Rfl: 1    omeprazole (priLOSEC) 40 MG capsule, Take 1 capsule by mouth 2 (Two) Times a Day Before Meals., Disp: 180 capsule, Rfl: 3    No Known Allergies    Social History     Tobacco Use    Smoking status: Never    Smokeless tobacco: Never   Vaping Use    Vaping status: Never Used   Substance Use Topics    Alcohol use: Yes     Comment: rare    Drug use: No       Family History   Problem Relation Age of Onset    Stroke Father     Colon cancer Paternal Grandfather     Colon cancer Paternal Aunt     Breast cancer Neg Hx     Ovarian cancer Neg Hx     Deep vein thrombosis Neg Hx     Pulmonary embolism Neg Hx     Uterine cancer Neg Hx        Review of Systems   Constitutional:  Negative for activity change, appetite change, fatigue, fever and unexpected weight change.   Respiratory:  Negative for cough and shortness of breath.    Cardiovascular:  Negative for chest pain and palpitations.   Gastrointestinal:  Negative for abdominal distention, abdominal pain, constipation, diarrhea and nausea.   Endocrine: Negative for cold  "intolerance and heat intolerance.   Genitourinary:  Negative for dyspareunia, dysuria, menstrual problem, pelvic pain, vaginal bleeding, vaginal discharge and vaginal pain.   Musculoskeletal:  Negative for arthralgias, back pain, gait problem, neck pain and neck stiffness.   Skin:  Negative for color change and rash.   Neurological:  Negative for headaches.   Psychiatric/Behavioral:  Negative for dysphoric mood. The patient is not nervous/anxious.        /88   Ht 167.6 cm (66\")   Wt 75 kg (165 lb 6.4 oz)   BMI 26.70 kg/m²     Physical Exam   Constitutional: She is oriented to person, place, and time. She appears well-developed.   HENT:   Head: Normocephalic and atraumatic.   Eyes: Conjunctivae are normal. No scleral icterus.   Neck: No thyromegaly present.   Cardiovascular: Normal rate and regular rhythm.   Pulmonary/Chest: Effort normal and breath sounds normal. Right breast exhibits no inverted nipple, no mass, no nipple discharge, no skin change and no tenderness. Left breast exhibits no inverted nipple, no mass, no nipple discharge, no skin change and no tenderness.   Abdominal: Soft. Normal appearance and bowel sounds are normal. She exhibits no distension and no mass. There is no abdominal tenderness. There is no rebound and no guarding. No hernia.   Genitourinary:    Rectum normal.      Pelvic exam was performed with patient supine.   There is no rash, tenderness, lesion or injury on the right labia. There is no rash, tenderness, lesion or injury on the left labia. Right adnexum displays no mass, no tenderness and no fullness. Left adnexum displays no mass, no tenderness and no fullness.    No vaginal discharge, erythema, tenderness or bleeding.   No erythema, tenderness or bleeding in the vagina.    No foreign body in the vagina.      No signs of injury in the vagina.      Genitourinary Comments: Normal cuff-uterus and cervix absent       Neurological: She is alert and oriented to person, place, and " time.   Skin: Skin is warm and dry.   Psychiatric: Her behavior is normal. Mood, judgment and thought content normal.   Nursing note and vitals reviewed.         Assessment/Plan    1) GYN HM: normal pap/HPV 2/2023. SBE demonstrated and encouraged.  2) STD screening: declines Condoms encouraged.  3) Bone health - Weight bearing exercise, dietary calcium recommendations and vitamin D reviewed.   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   8) DEXA- UTD 11/2023- normal. Repeat in 3- 5 years  9)C scope- UTD 8/2022, repeat in 5 years  10) Parts of this document have been copied or forwarded from her previous visits and have been reviewed, updated and edited as indicated.   11))Follow up prn and 1 year annual       Diagnoses and all orders for this visit:    1. Routine gynecological examination (Primary)  -     POC Urinalysis Dipstick    2. Encounter for screening mammogram for malignant neoplasm of breast  -     Mammo Screening Digital Tomosynthesis Bilateral With CAD; Future        Ariana Salcedo MD  10:11 EST  2/14/2024

## 2024-02-19 RX ORDER — AMITRIPTYLINE HYDROCHLORIDE 10 MG/1
TABLET, FILM COATED ORAL
Qty: 30 TABLET | Refills: 0 | Status: SHIPPED | OUTPATIENT
Start: 2024-02-19

## 2024-03-06 RX ORDER — AMITRIPTYLINE HYDROCHLORIDE 10 MG/1
TABLET, FILM COATED ORAL
Qty: 30 TABLET | Refills: 0 | OUTPATIENT
Start: 2024-03-06

## 2024-03-18 RX ORDER — LISINOPRIL 30 MG/1
30 TABLET ORAL DAILY
Qty: 90 TABLET | Refills: 1 | Status: SHIPPED | OUTPATIENT
Start: 2024-03-18 | End: 2024-03-19 | Stop reason: SDUPTHER

## 2024-03-19 ENCOUNTER — OFFICE VISIT (OUTPATIENT)
Dept: FAMILY MEDICINE CLINIC | Facility: CLINIC | Age: 59
End: 2024-03-19
Payer: COMMERCIAL

## 2024-03-19 VITALS
HEIGHT: 66 IN | RESPIRATION RATE: 18 BRPM | TEMPERATURE: 96.4 F | OXYGEN SATURATION: 99 % | WEIGHT: 163 LBS | SYSTOLIC BLOOD PRESSURE: 112 MMHG | DIASTOLIC BLOOD PRESSURE: 80 MMHG | HEART RATE: 96 BPM | BODY MASS INDEX: 26.2 KG/M2

## 2024-03-19 DIAGNOSIS — R73.9 HYPERGLYCEMIA: Primary | ICD-10-CM

## 2024-03-19 DIAGNOSIS — I10 ESSENTIAL HYPERTENSION: ICD-10-CM

## 2024-03-19 DIAGNOSIS — E78.2 MIXED HYPERLIPIDEMIA: ICD-10-CM

## 2024-03-19 DIAGNOSIS — G47.00 INSOMNIA, UNSPECIFIED TYPE: ICD-10-CM

## 2024-03-19 DIAGNOSIS — R73.03 PREDIABETES: ICD-10-CM

## 2024-03-19 PROCEDURE — 99214 OFFICE O/P EST MOD 30 MIN: CPT | Performed by: INTERNAL MEDICINE

## 2024-03-19 RX ORDER — ATORVASTATIN CALCIUM 20 MG/1
20 TABLET, FILM COATED ORAL DAILY
Qty: 90 TABLET | Refills: 1 | Status: SHIPPED | OUTPATIENT
Start: 2024-03-19

## 2024-03-19 RX ORDER — TRAZODONE HYDROCHLORIDE 50 MG/1
50 TABLET ORAL NIGHTLY PRN
Qty: 90 TABLET | Refills: 1 | Status: SHIPPED | OUTPATIENT
Start: 2024-03-19

## 2024-03-19 RX ORDER — LISINOPRIL 30 MG/1
30 TABLET ORAL DAILY
Qty: 90 TABLET | Refills: 1 | Status: SHIPPED | OUTPATIENT
Start: 2024-03-19

## 2024-03-19 NOTE — PROGRESS NOTES
"Chief Complaint  Hypertension and Hyperlipidemia    Subjective        Julisa Nixon presents to [facility]  Hypertension    Hyperlipidemia      Here for f/u on prediabetes, HTN, HL and GERD.  She had lost some weight with recent diagnosis of esophagitis but she has gained 12 pounds back.  She is no longer taking omeprazole and is seeing Dr. Reyes for GI and sees him in a few months.  Her chest sx related to esophagitis have resolved.  When she went to ER for atypical chest pain, ER referred her to cardiologist.  She saw Dr. Guillen and he ordered calcium CT scoring which was normal.  He encouraged her to continue taking lipitor for HL.  She saw Dr. Monae rheumatologist due to fear of having joint issues and auto immune disease.  Dr. Monae told her that she has no auto immune disease.  Has occasional leg and shoulder pain /aches and has seen multiple doctors for it including ortho with injections.  Needs refills today.  Elavil that she took for insomnia caused too much grogginess.  Therefore she wants to go back to trazodone.  GB u/s showed fatty liver.  Neg dexa and neg MMG with gyn Dr. Salcedo.  CXR neg    Objective   Vital Signs:  /80   Pulse 96   Temp 96.4 °F (35.8 °C)   Resp 18   Ht 167.6 cm (66\")   Wt 73.9 kg (163 lb)   SpO2 99%   BMI 26.31 kg/m²   Estimated body mass index is 26.31 kg/m² as calculated from the following:    Height as of this encounter: 167.6 cm (66\").    Weight as of this encounter: 73.9 kg (163 lb).               Physical Exam  Vitals and nursing note reviewed.   Constitutional:       Appearance: Normal appearance. She is well-developed.   HENT:      Head: Normocephalic and atraumatic.      Right Ear: Tympanic membrane, ear canal and external ear normal.      Left Ear: Tympanic membrane, ear canal and external ear normal.   Eyes:      Extraocular Movements: Extraocular movements intact.      Conjunctiva/sclera: Conjunctivae normal.   Neck:    "   Vascular: No carotid bruit.   Cardiovascular:      Rate and Rhythm: Normal rate and regular rhythm.      Heart sounds: Normal heart sounds.      Comments: No bruits  Pulmonary:      Effort: Pulmonary effort is normal. No respiratory distress.      Breath sounds: Normal breath sounds. No stridor. No wheezing, rhonchi or rales.   Chest:      Chest wall: No tenderness.   Abdominal:      General: Bowel sounds are normal. There is no distension.      Palpations: Abdomen is soft. There is no mass.      Tenderness: There is no abdominal tenderness. There is no guarding or rebound.      Hernia: No hernia is present.   Musculoskeletal:      Cervical back: Neck supple.      Left lower leg: No edema.   Lymphadenopathy:      Cervical: No cervical adenopathy.   Skin:     General: Skin is warm.   Neurological:      General: No focal deficit present.      Mental Status: She is alert and oriented to person, place, and time. Mental status is at baseline.   Psychiatric:         Mood and Affect: Mood normal.         Behavior: Behavior normal.         Thought Content: Thought content normal.         Judgment: Judgment normal.     Answers submitted by the patient for this visit:  Primary Reason for Visit (Submitted on 3/18/2024)  What is the primary reason for your visit?: High Blood Pressure     Result Review :                     Assessment and Plan     Diagnoses and all orders for this visit:    1. Hyperglycemia (Primary)  -     Hemoglobin A1c    2. Prediabetes  -     Hemoglobin A1c    3. Essential hypertension  -     CBC & Differential  -     Comprehensive Metabolic Panel  -     Hemoglobin A1c  -     Lipid Panel With LDL / HDL Ratio  -     TSH  -     T4, Free    4. Mixed hyperlipidemia  -     CBC & Differential  -     Comprehensive Metabolic Panel  -     Hemoglobin A1c  -     Lipid Panel With LDL / HDL Ratio  -     TSH  -     T4, Free    5. Insomnia, unspecified type    Other orders  -     traZODone (DESYREL) 50 MG tablet; Take 1  tablet by mouth At Night As Needed for Sleep.  Dispense: 90 tablet; Refill: 1  -     atorvastatin (LIPITOR) 20 MG tablet; Take 1 tablet by mouth Daily.  Dispense: 90 tablet; Refill: 1  -     lisinopril (PRINIVIL,ZESTRIL) 30 MG tablet; Take 1 tablet by mouth Daily.  Dispense: 90 tablet; Refill: 1    CT heart calcium scoring wo contrast (01/16/2024 07:18)   US Gallbladder (09/26/2023 07:52)  XR Chest 2 View (08/14/2023 11:11)  DEXA Bone Density Axial (11/18/2023 09:16)  Mammo Screening Digital Tomosynthesis Bilateral With CAD (03/24/2023 11:13)  Insomnia--removed elavil from list and prescribe trazodone 50 mg qhs prn  Patient has history of prediabetes -check A1c.  Diet and exercise discussed.   Check lipids and CMP today.  Labs ordered.    Continue zestril for HTN --well controlled.  Continue lipitor for HL  Discussed weight bearing activity for bone health.  Osteostrong program discussed         Follow Up     No follow-ups on file.  Patient was given instructions and counseling regarding her condition or for health maintenance advice. Please see specific information pulled into the AVS if appropriate.

## 2024-03-20 LAB
ALBUMIN SERPL-MCNC: 4.5 G/DL (ref 3.5–5.2)
ALBUMIN/GLOB SERPL: 1.6 G/DL
ALP SERPL-CCNC: 68 U/L (ref 39–117)
ALT SERPL-CCNC: 24 U/L (ref 1–33)
AST SERPL-CCNC: 13 U/L (ref 1–32)
BASOPHILS # BLD AUTO: 0.04 10*3/MM3 (ref 0–0.2)
BASOPHILS NFR BLD AUTO: 0.8 % (ref 0–1.5)
BILIRUB SERPL-MCNC: 0.5 MG/DL (ref 0–1.2)
BUN SERPL-MCNC: 19 MG/DL (ref 8–23)
BUN/CREAT SERPL: 22.6 (ref 7–25)
CALCIUM SERPL-MCNC: 9.5 MG/DL (ref 8.6–10.5)
CHLORIDE SERPL-SCNC: 102 MMOL/L (ref 98–107)
CHOLEST SERPL-MCNC: 160 MG/DL (ref 0–200)
CO2 SERPL-SCNC: 25.6 MMOL/L (ref 22–29)
CREAT SERPL-MCNC: 0.84 MG/DL (ref 0.57–1)
EGFRCR SERPLBLD CKD-EPI 2021: 79.7 ML/MIN/1.73
EOSINOPHIL # BLD AUTO: 0.06 10*3/MM3 (ref 0–0.4)
EOSINOPHIL NFR BLD AUTO: 1.2 % (ref 0.3–6.2)
ERYTHROCYTE [DISTWIDTH] IN BLOOD BY AUTOMATED COUNT: 13.8 % (ref 12.3–15.4)
GLOBULIN SER CALC-MCNC: 2.9 GM/DL
GLUCOSE SERPL-MCNC: 90 MG/DL (ref 65–99)
HBA1C MFR BLD: 5.6 % (ref 4.8–5.6)
HCT VFR BLD AUTO: 45.5 % (ref 34–46.6)
HDLC SERPL-MCNC: 65 MG/DL (ref 40–60)
HGB BLD-MCNC: 15 G/DL (ref 12–15.9)
IMM GRANULOCYTES # BLD AUTO: 0.01 10*3/MM3 (ref 0–0.05)
IMM GRANULOCYTES NFR BLD AUTO: 0.2 % (ref 0–0.5)
LDLC SERPL CALC-MCNC: 77 MG/DL (ref 0–100)
LDLC/HDLC SERPL: 1.15 {RATIO}
LYMPHOCYTES # BLD AUTO: 2.4 10*3/MM3 (ref 0.7–3.1)
LYMPHOCYTES NFR BLD AUTO: 49.1 % (ref 19.6–45.3)
MCH RBC QN AUTO: 29.4 PG (ref 26.6–33)
MCHC RBC AUTO-ENTMCNC: 33 G/DL (ref 31.5–35.7)
MCV RBC AUTO: 89.2 FL (ref 79–97)
MONOCYTES # BLD AUTO: 0.38 10*3/MM3 (ref 0.1–0.9)
MONOCYTES NFR BLD AUTO: 7.8 % (ref 5–12)
NEUTROPHILS # BLD AUTO: 2 10*3/MM3 (ref 1.7–7)
NEUTROPHILS NFR BLD AUTO: 40.9 % (ref 42.7–76)
NRBC BLD AUTO-RTO: 0 /100 WBC (ref 0–0.2)
PLATELET # BLD AUTO: 294 10*3/MM3 (ref 140–450)
POTASSIUM SERPL-SCNC: 3.9 MMOL/L (ref 3.5–5.2)
PROT SERPL-MCNC: 7.4 G/DL (ref 6–8.5)
RBC # BLD AUTO: 5.1 10*6/MM3 (ref 3.77–5.28)
SODIUM SERPL-SCNC: 141 MMOL/L (ref 136–145)
T4 FREE SERPL-MCNC: 1.07 NG/DL (ref 0.93–1.7)
TRIGL SERPL-MCNC: 102 MG/DL (ref 0–150)
TSH SERPL DL<=0.005 MIU/L-ACNC: 1.4 UIU/ML (ref 0.27–4.2)
VLDLC SERPL CALC-MCNC: 18 MG/DL (ref 5–40)
WBC # BLD AUTO: 4.89 10*3/MM3 (ref 3.4–10.8)

## 2024-03-27 ENCOUNTER — HOSPITAL ENCOUNTER (OUTPATIENT)
Dept: MAMMOGRAPHY | Facility: HOSPITAL | Age: 59
Discharge: HOME | End: 2024-03-27
Admitting: OBSTETRICS & GYNECOLOGY
Payer: COMMERCIAL

## 2024-03-27 DIAGNOSIS — Z12.31 ENCOUNTER FOR SCREENING MAMMOGRAM FOR MALIGNANT NEOPLASM OF BREAST: ICD-10-CM

## 2024-03-27 PROCEDURE — 77063 BREAST TOMOSYNTHESIS BI: CPT

## 2024-03-27 PROCEDURE — 77067 SCR MAMMO BI INCL CAD: CPT

## 2024-07-12 RX ORDER — LISINOPRIL 30 MG/1
30 TABLET ORAL DAILY
Qty: 90 TABLET | Refills: 1 | Status: SHIPPED | OUTPATIENT
Start: 2024-07-12

## 2024-08-13 ENCOUNTER — TELEPHONE (OUTPATIENT)
Dept: GASTROENTEROLOGY | Facility: CLINIC | Age: 59
End: 2024-08-13
Payer: COMMERCIAL

## 2024-08-13 DIAGNOSIS — K21.9 GASTROESOPHAGEAL REFLUX DISEASE, UNSPECIFIED WHETHER ESOPHAGITIS PRESENT: ICD-10-CM

## 2024-08-13 DIAGNOSIS — R10.13 DYSPEPSIA: ICD-10-CM

## 2024-08-13 RX ORDER — OMEPRAZOLE 40 MG/1
40 CAPSULE, DELAYED RELEASE ORAL
Qty: 180 CAPSULE | Refills: 3 | Status: SHIPPED | OUTPATIENT
Start: 2024-08-13

## 2024-08-13 NOTE — TELEPHONE ENCOUNTER
PA request from [facility] for omeprazole    Unable to process ( No coverage was found  No eligibility was found. Please reconfirm the member's health plan coverage before re-submitting.)    LOV 01/12/2024  Last insurance scanned 02/28/23- no up to date insurance card on file    Sent notification to pharmacy - send current insurance information to file auth for omeprazole

## 2024-08-28 ENCOUNTER — OFFICE VISIT (OUTPATIENT)
Dept: FAMILY MEDICINE CLINIC | Facility: CLINIC | Age: 59
End: 2024-08-28
Payer: COMMERCIAL

## 2024-08-28 VITALS
DIASTOLIC BLOOD PRESSURE: 72 MMHG | HEIGHT: 66 IN | BODY MASS INDEX: 26.36 KG/M2 | WEIGHT: 164 LBS | SYSTOLIC BLOOD PRESSURE: 118 MMHG | OXYGEN SATURATION: 96 % | HEART RATE: 94 BPM | RESPIRATION RATE: 18 BRPM

## 2024-08-28 DIAGNOSIS — R22.1 NECK MASS: Primary | ICD-10-CM

## 2024-08-28 PROCEDURE — 99213 OFFICE O/P EST LOW 20 MIN: CPT | Performed by: INTERNAL MEDICINE

## 2024-08-28 NOTE — PROGRESS NOTES
"Chief Complaint  Imaging Only (Pt here to get order for lymphnode in neck )    Subjective        Julisa Nixon presents to [facility]  History of Present Illness  Here c/o left sided paracervical LN or thyroid nodule that has been visible and palpable for a few months.  It is not painful.  She noticed it about 2 months ago when she turned her neck.  She thinks that is a slight bit bigger than when she first felt it.  No night sweats, no fevers. No fatigue and no weight loss.  Normal labs in July 23, 2024.  Normal CBC as well.    No recent mouth issues or skin or head and neck infections.    Objective   Vital Signs:  /72   Pulse 94   Resp 18   Ht 167.6 cm (66\")   Wt 74.4 kg (164 lb)   SpO2 96%   BMI 26.47 kg/m²   Estimated body mass index is 26.47 kg/m² as calculated from the following:    Height as of this encounter: 167.6 cm (66\").    Weight as of this encounter: 74.4 kg (164 lb).            Physical Exam  Vitals and nursing note reviewed.   Constitutional:       Appearance: Normal appearance.   HENT:      Head: Normocephalic and atraumatic.      Right Ear: Tympanic membrane, ear canal and external ear normal.      Left Ear: Tympanic membrane, ear canal and external ear normal.      Mouth/Throat:      Mouth: Mucous membranes are moist.      Pharynx: No oropharyngeal exudate or posterior oropharyngeal erythema.   Neck:      Thyroid: Thyroid mass present.     Pulmonary:      Effort: Pulmonary effort is normal.   Neurological:      Mental Status: She is alert.        Result Review :                Assessment and Plan   Diagnoses and all orders for this visit:    1. Neck mass (Primary)  -     US Head Neck Soft Tissue; Future  -     Ambulatory Referral to ENT (Otolaryngology)    Neck mas is either coming from the left lateral edge of thyroid or it is an enlarged paratracheal LN.  The mass is firm and does move up and down with swallowing.  Will get U/S of the neck and will " also refer to ENT Dr Stevens.  Patient voiced understanding.           Follow Up   No follow-ups on file.  Patient was given instructions and counseling regarding her condition or for health maintenance advice. Please see specific information pulled into the AVS if appropriate.

## 2024-09-04 ENCOUNTER — HOSPITAL ENCOUNTER (OUTPATIENT)
Dept: ULTRASOUND IMAGING | Facility: HOSPITAL | Age: 59
Discharge: HOME | End: 2024-09-04
Admitting: INTERNAL MEDICINE
Payer: COMMERCIAL

## 2024-09-04 DIAGNOSIS — R22.1 NECK MASS: ICD-10-CM

## 2024-09-04 PROCEDURE — 76536 US EXAM OF HEAD AND NECK: CPT

## 2024-10-23 RX ORDER — ATORVASTATIN CALCIUM 20 MG/1
20 TABLET, FILM COATED ORAL DAILY
Qty: 90 TABLET | Refills: 1 | Status: SHIPPED | OUTPATIENT
Start: 2024-10-23

## 2024-10-23 NOTE — TELEPHONE ENCOUNTER
Rx Refill Note  Requested Prescriptions     Pending Prescriptions Disp Refills    atorvastatin (LIPITOR) 20 MG tablet 90 tablet 1     Sig: Take 1 tablet by mouth Daily.      Last office visit with prescribing clinician: 8/28/2024   Last telemedicine visit with prescribing clinician: Visit date not found   Next office visit with prescribing clinician: 3/20/2025                         Would you like a call back once the refill request has been completed: [] Yes [] No    If the office needs to give you a call back, can they leave a voicemail: [] Yes [] No    Robert Vang MA  10/23/24, 12:34 EDT

## 2025-02-11 NOTE — TELEPHONE ENCOUNTER
Rx Refill Note  Requested Prescriptions     Pending Prescriptions Disp Refills    lisinopril (PRINIVIL,ZESTRIL) 30 MG tablet 90 tablet 1     Sig: Take 1 tablet by mouth Daily.      Last office visit with prescribing clinician: 8/28/2024   Last telemedicine visit with prescribing clinician: Visit date not found   Next office visit with prescribing clinician: 3/20/2025                         Would you like a call back once the refill request has been completed: [] Yes [] No    If the office needs to give you a call back, can they leave a voicemail: [] Yes [] No    Robert Vang MA  02/11/25, 08:29 EST

## 2025-02-12 RX ORDER — LISINOPRIL 30 MG/1
30 TABLET ORAL DAILY
Qty: 90 TABLET | Refills: 1 | Status: SHIPPED | OUTPATIENT
Start: 2025-02-12

## 2025-03-17 ENCOUNTER — TRANSCRIBE ORDERS (OUTPATIENT)
Dept: ADMINISTRATIVE | Facility: HOSPITAL | Age: 60
End: 2025-03-17
Payer: COMMERCIAL

## 2025-03-17 DIAGNOSIS — Z12.31 SCREENING MAMMOGRAM FOR BREAST CANCER: Primary | ICD-10-CM

## 2025-03-20 ENCOUNTER — OFFICE VISIT (OUTPATIENT)
Dept: FAMILY MEDICINE CLINIC | Facility: CLINIC | Age: 60
End: 2025-03-20
Payer: COMMERCIAL

## 2025-03-20 VITALS
HEART RATE: 67 BPM | HEIGHT: 66 IN | WEIGHT: 170 LBS | SYSTOLIC BLOOD PRESSURE: 112 MMHG | OXYGEN SATURATION: 97 % | BODY MASS INDEX: 27.32 KG/M2 | DIASTOLIC BLOOD PRESSURE: 66 MMHG

## 2025-03-20 DIAGNOSIS — K21.00 GASTROESOPHAGEAL REFLUX DISEASE WITH ESOPHAGITIS WITHOUT HEMORRHAGE: ICD-10-CM

## 2025-03-20 DIAGNOSIS — L08.9 SKIN INFECTION: ICD-10-CM

## 2025-03-20 DIAGNOSIS — I10 ESSENTIAL HYPERTENSION: Primary | ICD-10-CM

## 2025-03-20 DIAGNOSIS — G47.09 OTHER INSOMNIA: ICD-10-CM

## 2025-03-20 DIAGNOSIS — E78.2 MIXED HYPERLIPIDEMIA: ICD-10-CM

## 2025-03-20 DIAGNOSIS — R73.9 HYPERGLYCEMIA: ICD-10-CM

## 2025-03-20 DIAGNOSIS — Z00.00 WELL ADULT EXAM: ICD-10-CM

## 2025-03-20 RX ORDER — MUPIROCIN 20 MG/G
1 OINTMENT TOPICAL 2 TIMES DAILY
Qty: 30 G | Refills: 0 | Status: SHIPPED | OUTPATIENT
Start: 2025-03-20

## 2025-03-20 NOTE — PROGRESS NOTES
Chief Complaint  Annual Exam    Patient or patient representative verbalized consent for the use of Ambient Listening during the visit with  Lupe Stewart MD for chart documentation. 3/20/2025  09:23 EDT    Subjective        Julisa Gomesfarnaz presents to [facility]    History of Present Illness  The patient is a 59-year-old female who presents for her annual visit. She has a past medical history of hypertension, mixed hyperlipidemia, hyperglycemia, and dyspepsia with IBS-C and acid reflux.    She was taking Lipitor 20 mg daily for her hyperlipidemia but discontinued it 3 to 4 months ago due to severe muscle and joint pain, which disrupted her sleep for 2 to 3 weeks. The pain subsided after discontinuing the medication. She has previously tried Crestor, which also resulted in significant discomfort  She includes oatmeal in her daily diet and occasionally consumes sardines. She has a preference for cheese and cottage cheese.    For her hypertension, she takes lisinopril 30 mg daily. She occasionally halves her lisinopril dose for 3 to 4 weeks, then resumes the full dose. She is seeking advice on whether this practice is safe.    She takes omeprazole 40 mg twice daily for GERD symptoms and Tums as needed for heartburn. She had an upper endoscopy in 09/2023 and a colonoscopy in 08/2022. She has learned to manage her acid reflux through dietary modifications, including the consumption of oats. She takes omeprazole only when dining out and not on a daily basis.    She takes trazodone 50 mg daily for insomnia.    She reports a recent navel infection, which she managed with antibiotic eye drops and alcohol swabs, resulting in resolution within 3 days. She also mentions the presence of a blood blister in the same area, which appeared 2 weeks ago.    She had an upper endoscopy in 09/2023 and a colonoscopy in 08/2022. Her last labs were done in 07/2024 at [facility], showing a hemoglobin  "A1c of 5.8, down from 6.1 in 2023. Her lipids increased after discontinuation of Lipitor. Her last Pap smear was in 02/2023 with Dr. Salcedo and was negative. Her last mammogram was in 03/2024, with a second mammogram ordered for this month; previous mammograms have been negative. The next colonoscopy is suggested for 2032. Her last bone density test was in 2023 and was normal.    ALLERGIES  The patient has an allergic reaction to ATORVASTATIN.    MEDICATIONS  Current: Lisinopril, omeprazole, trazodone, Tums.  Discontinued: Atorvastatin.      Objective   Vital Signs:  /66 (BP Location: Left arm, Patient Position: Sitting, Cuff Size: Adult)   Pulse 67   Ht 167.6 cm (66\")   Wt 77.1 kg (170 lb)   SpO2 97%   BMI 27.44 kg/m²   Estimated body mass index is 27.44 kg/m² as calculated from the following:    Height as of this encounter: 167.6 cm (66\").    Weight as of this encounter: 77.1 kg (170 lb).    BMI is >= 25 and <30. (Overweight) The following options were offered after discussion;: weight loss educational material (shared in after visit summary), exercise counseling/recommendations, and nutrition counseling/recommendations      Physical Exam  Vitals and nursing note reviewed.   Constitutional:       Appearance: Normal appearance. She is well-developed.   HENT:      Head: Normocephalic and atraumatic.      Right Ear: Tympanic membrane, ear canal and external ear normal.      Left Ear: Tympanic membrane, ear canal and external ear normal.   Eyes:      Extraocular Movements: Extraocular movements intact.      Conjunctiva/sclera: Conjunctivae normal.   Neck:      Vascular: No carotid bruit.   Cardiovascular:      Rate and Rhythm: Normal rate and regular rhythm.      Heart sounds: Normal heart sounds.      Comments: No bruits  Pulmonary:      Effort: Pulmonary effort is normal. No respiratory distress.      Breath sounds: Normal breath sounds. No stridor. No wheezing, rhonchi or rales.   Chest:      Chest " wall: No tenderness.   Abdominal:      General: Bowel sounds are normal. There is no distension.      Palpations: Abdomen is soft. There is no mass.      Tenderness: There is no abdominal tenderness. There is no guarding or rebound.      Hernia: No hernia is present.   Musculoskeletal:      Cervical back: Neck supple.      Right lower leg: No edema.      Left lower leg: No edema.   Lymphadenopathy:      Cervical: No cervical adenopathy.   Skin:     General: Skin is warm.   Neurological:      General: No focal deficit present.      Mental Status: She is alert and oriented to person, place, and time. Mental status is at baseline.   Psychiatric:         Mood and Affect: Mood normal.         Behavior: Behavior normal.         Thought Content: Thought content normal.         Judgment: Judgment normal.      Result Review :        DEXA Bone Density Axial (11/18/2023 09:16)   Mammo Screening Digital Tomosynthesis Bilateral With CAD (03/27/2024 13:46)          Assessment and Plan   Diagnoses and all orders for this visit:    1. Essential hypertension (Primary)  -     Comprehensive Metabolic Panel; Future  -     CBC & Differential; Future  -     Hemoglobin A1c; Future  -     Lipid Panel With LDL / HDL Ratio; Future  -     TSH; Future  -     T4, Free; Future  -     Urinalysis With Culture If Indicated -; Future  -     Vitamin D,25-Hydroxy; Future    2. Mixed hyperlipidemia  -     Comprehensive Metabolic Panel; Future  -     CBC & Differential; Future  -     Hemoglobin A1c; Future  -     Lipid Panel With LDL / HDL Ratio; Future  -     TSH; Future  -     T4, Free; Future  -     Urinalysis With Culture If Indicated -; Future  -     Vitamin D,25-Hydroxy; Future    3. Hyperglycemia  -     Hemoglobin A1c; Future    4. Gastroesophageal reflux disease with esophagitis without hemorrhage    5. Other insomnia    6. Well adult exam  -     Comprehensive Metabolic Panel; Future  -     CBC & Differential; Future  -     Hemoglobin A1c;  Future  -     Lipid Panel With LDL / HDL Ratio; Future  -     TSH; Future  -     T4, Free; Future  -     Urinalysis With Culture If Indicated -; Future  -     Vitamin D,25-Hydroxy; Future    7. Skin infection    Other orders  -     mupirocin (BACTROBAN) 2 % ointment; Apply 1 Application topically to the appropriate area as directed 2 (Two) Times a Day.  Dispense: 30 g; Refill: 0             Assessment & Plan  1. Hyperlipidemia.  She discontinued Lipitor 20 mg daily due to muscle and joint pain. Alternative statins like Crestor were discussed but may cause similar side effects. Dietary modifications were recommended, including limiting red meat, pork, sausage, hughes, and sardines, and incorporating more fish, chicken, and white cheeses such as mozzarella, Parmesan, and feta. She was advised to consume cottage cheese for its high protein and low carbohydrate content, which can benefit bone health and help regulate blood sugar levels.  I am listing her as a statin allergy.      2. Hypertension.  She is currently taking lisinopril 30 mg daily or if her BP is running low, she likes to take 1/2 tab and she monitors her BP regularly and follows it closely.    3. Gastroesophageal Reflux Disease (GERD).  She is taking omeprazole 40 mg daily PRN for GERD symptoms and uses Tums as needed for heartburn.    4. Insomnia.  She is taking trazodone 50 mg daily for insomnia.    PROCEDURE  Upper endoscopy in September 2023.  Colonoscopy in August 2022.         Follow Up   No follow-ups on file.  Patient was given instructions and counseling regarding her condition or for health maintenance advice. Please see specific information pulled into the AVS if appropriate.           Lupe Stewart MD   09:48 EDT   [unfilled]

## 2025-03-31 ENCOUNTER — HOSPITAL ENCOUNTER (OUTPATIENT)
Dept: MAMMOGRAPHY | Facility: HOSPITAL | Age: 60
Discharge: HOME | End: 2025-03-31
Admitting: OBSTETRICS & GYNECOLOGY
Payer: COMMERCIAL

## 2025-03-31 DIAGNOSIS — Z12.31 SCREENING MAMMOGRAM FOR BREAST CANCER: ICD-10-CM

## 2025-03-31 PROCEDURE — 77067 SCR MAMMO BI INCL CAD: CPT

## 2025-03-31 PROCEDURE — 77063 BREAST TOMOSYNTHESIS BI: CPT

## 2025-04-21 ENCOUNTER — TELEPHONE (OUTPATIENT)
Dept: FAMILY MEDICINE CLINIC | Facility: CLINIC | Age: 60
End: 2025-04-21
Payer: COMMERCIAL

## 2025-04-21 NOTE — TELEPHONE ENCOUNTER
----- Message from Guerda SAMANIEGO sent at 4/21/2025 12:54 PM EDT -----  Active labs noted in chart. Please contact and elina.  ----- Message -----  From: SYSTEM  Sent: 4/21/2025   1:11 AM EDT  To: Noam Pollard [facility]

## 2025-04-22 RX ORDER — TRAZODONE HYDROCHLORIDE 50 MG/1
50 TABLET ORAL NIGHTLY PRN
Qty: 30 TABLET | Refills: 1 | Status: SHIPPED | OUTPATIENT
Start: 2025-04-22

## 2025-04-25 ENCOUNTER — TELEPHONE (OUTPATIENT)
Dept: FAMILY MEDICINE CLINIC | Facility: CLINIC | Age: 60
End: 2025-04-25

## 2025-06-16 ENCOUNTER — OFFICE VISIT (OUTPATIENT)
Dept: GASTROENTEROLOGY | Facility: CLINIC | Age: 60
End: 2025-06-16
Payer: COMMERCIAL

## 2025-06-16 VITALS
BODY MASS INDEX: 27.21 KG/M2 | HEART RATE: 84 BPM | OXYGEN SATURATION: 97 % | SYSTOLIC BLOOD PRESSURE: 116 MMHG | WEIGHT: 168.6 LBS | TEMPERATURE: 98 F | DIASTOLIC BLOOD PRESSURE: 80 MMHG

## 2025-06-16 DIAGNOSIS — K58.1 IRRITABLE BOWEL SYNDROME WITH CONSTIPATION: ICD-10-CM

## 2025-06-16 DIAGNOSIS — K21.00 GASTROESOPHAGEAL REFLUX DISEASE WITH ESOPHAGITIS WITHOUT HEMORRHAGE: Primary | ICD-10-CM

## 2025-06-16 DIAGNOSIS — R10.13 DYSPEPSIA: ICD-10-CM

## 2025-06-16 PROCEDURE — 99214 OFFICE O/P EST MOD 30 MIN: CPT | Performed by: STUDENT IN AN ORGANIZED HEALTH CARE EDUCATION/TRAINING PROGRAM

## 2025-06-16 NOTE — PROGRESS NOTES
Julisa Nixon is a 60 y.o. female with PMH of GERD and IBS-C + noted below who presents with No chief complaint on file.      Subjective     # GERD   # Dyspepsia   - No longer requiring Omeprazole scheduled. Is taking PRN for infrequent symptoms based on what she eats.   - Is more cognizant of her diet.    - EGD in 9/2023 showed grade A esophagitis, normal stomach (biopsied -- negative for H Pylori).       # IBS-C   - Reported her constipation is well controlled through increasing her dietary fiber intake.   - Reports having one BM daily to every other day.   - Did not need the Trulance previously prescribed after increasing her dietary fiber intake.             Past Medical History:   Diagnosis Date    Esophagitis     Hyperlipidemia     Hypertension     PONV (postoperative nausea and vomiting)     Rheumatic fever 1970    and 1972    Shoulder pain, right        Social History     Socioeconomic History    Marital status:    Tobacco Use    Smoking status: Never    Smokeless tobacco: Never   Vaping Use    Vaping status: Never Used   Substance and Sexual Activity    Alcohol use: Yes     Comment: rare    Drug use: No    Sexual activity: Not Currently     Partners: Male     Birth control/protection: Surgical     Comment: Hysterectomy         Current Outpatient Medications:     lisinopril (PRINIVIL,ZESTRIL) 30 MG tablet, Take 1 tablet by mouth Daily. (Patient taking differently: Take 0.5 tablets by mouth Daily.), Disp: 90 tablet, Rfl: 1    mupirocin (BACTROBAN) 2 % ointment, Apply 1 Application topically to the appropriate area as directed 2 (Two) Times a Day., Disp: 30 g, Rfl: 0    omeprazole (priLOSEC) 40 MG capsule, TAKE 1 CAPSULE BY MOUTH TWICE A DAY BEFORE MEALS (Patient taking differently: Take 1 capsule by mouth Daily As Needed.), Disp: 180 capsule, Rfl: 3    traZODone (DESYREL) 50 MG tablet, TAKE ONE TABLET BY MOUTH ONCE NIGHTLY AS NEEDED FOR SLEEP (Patient taking differently: Take 0.5 tablets by mouth  At Night As Needed for Sleep.), Disp: 30 tablet, Rfl: 1    calcium carbonate (TUMS) 500 MG chewable tablet, Chew 1 tablet As Needed for Indigestion or Heartburn. (Patient not taking: Reported on 6/16/2025), Disp: , Rfl:     Objective   Vitals:    06/16/25 1337   BP: 116/80   Pulse: 84   Temp: 98 °F (36.7 °C)   SpO2: 97%         06/16/25  1337   Weight: 76.5 kg (168 lb 9.6 oz)     Body mass index is 27.21 kg/m².      Physical Exam  Vitals reviewed.   Constitutional:       Appearance: Normal appearance.   HENT:      Head: Normocephalic and atraumatic.   Eyes:      Extraocular Movements: Extraocular movements intact.      Conjunctiva/sclera: Conjunctivae normal.   Cardiovascular:      Rate and Rhythm: Normal rate and regular rhythm.      Heart sounds: Normal heart sounds.   Pulmonary:      Effort: Pulmonary effort is normal.      Breath sounds: Normal breath sounds.   Abdominal:      General: Abdomen is flat. Bowel sounds are normal. There is no distension.      Palpations: Abdomen is soft.      Tenderness: There is no abdominal tenderness.   Neurological:      Mental Status: She is alert.   Psychiatric:         Mood and Affect: Mood normal.         Behavior: Behavior normal.         WBC   Date Value Ref Range Status   05/20/2025 5.64 3.40 - 10.80 10*3/mm3 Final   07/23/2024 5.57 4.5 - 11.0 10*3/uL Final     RBC   Date Value Ref Range Status   05/20/2025 4.76 3.77 - 5.28 10*6/mm3 Final   07/23/2024 4.74 4.0 - 5.2 10*6/uL Final     Hemoglobin   Date Value Ref Range Status   05/20/2025 14.0 12.0 - 15.9 g/dL Final   07/23/2024 13.9 12.0 - 16.0 g/dL Final     Hematocrit   Date Value Ref Range Status   05/20/2025 44.0 34.0 - 46.6 % Final   07/23/2024 42.2 36.0 - 46.0 % Final     MCV   Date Value Ref Range Status   05/20/2025 92.4 79.0 - 97.0 fL Final   07/23/2024 89 80.0 - 100.0 fL Final     MCH   Date Value Ref Range Status   05/20/2025 29.4 26.6 - 33.0 pg Final   07/23/2024 29.3 26.0 - 34.0 pg Final     [facility]   Date Value  Ref Range Status   05/20/2025 31.8 31.5 - 35.7 g/dL Final   07/23/2024 32.9 31.0 - 37.0 g/dL Final     RDW   Date Value Ref Range Status   05/20/2025 13.8 12.3 - 15.4 % Final   07/23/2024 13 12.0 - 16.8 % Final     RDW-SD   Date Value Ref Range Status   08/14/2023 45.0 37.0 - 54.0 fl Final     MPV   Date Value Ref Range Status   07/23/2024 9.5 8.4 - 12.4 fL Final     Platelets   Date Value Ref Range Status   05/20/2025 321 140 - 450 10*3/mm3 Final   07/23/2024 320 140 - 440 10*3/uL Final     Neutrophil Rel %   Date Value Ref Range Status   05/20/2025 49.8 42.7 - 76.0 % Final   07/23/2024 51.2 45 - 80 % Final     Lymphocyte Rel %   Date Value Ref Range Status   05/20/2025 39.4 19.6 - 45.3 % Final   07/23/2024 38.2 15 - 50 % Final     Monocyte Rel %   Date Value Ref Range Status   05/20/2025 8.7 5.0 - 12.0 % Final   07/23/2024 9 0 - 15 % Final     Eosinophil Rel %   Date Value Ref Range Status   05/20/2025 1.2 0.3 - 6.2 % Final     Eosinophil %   Date Value Ref Range Status   07/23/2024 0.9 0 - 7 % Final     Basophil Rel %   Date Value Ref Range Status   05/20/2025 0.7 0.0 - 1.5 % Final   07/23/2024 0.5 0 - 2 % Final     Immature Grans %   Date Value Ref Range Status   07/23/2024 0.2 0.0 - 1.0 % Final     Neutrophils Absolute   Date Value Ref Range Status   05/20/2025 2.81 1.70 - 7.00 10*3/mm3 Final   07/23/2024 2.85 2.0 - 8.8 10*3/uL Final     Lymphocytes Absolute   Date Value Ref Range Status   05/20/2025 2.22 0.70 - 3.10 10*3/mm3 Final   07/23/2024 2.13 0.7 - 5.5 10*3/uL Final     Monocytes Absolute   Date Value Ref Range Status   05/20/2025 0.49 0.10 - 0.90 10*3/mm3 Final   07/23/2024 0.5 0.0 - 1.7 10*3/uL Final     Eosinophils Absolute   Date Value Ref Range Status   05/20/2025 0.07 0.00 - 0.40 10*3/mm3 Final   07/23/2024 0.05 0.0 - 0.8 10*3/uL Final     Basophils Absolute   Date Value Ref Range Status   05/20/2025 0.04 0.00 - 0.20 10*3/mm3 Final   07/23/2024 0.03 0.0 - 0.2 10*3/uL Final     Immature Grans,  Absolute   Date Value Ref Range Status   07/23/2024 0.01 0.00 - 0.10 10*3/uL Final     nRBC   Date Value Ref Range Status   05/20/2025 0.0 0.0 - 0.2 /100 WBC Final       Lab Results   Component Value Date    GLUCOSE 102 (H) 05/20/2025    BUN 12 05/20/2025    CREATININE 0.80 05/20/2025    EGFRIFNONA 77 02/22/2022    EGFRIFAFRI 89 02/22/2022    BCR 15.0 05/20/2025    CO2 28.6 05/20/2025    CALCIUM 9.5 05/20/2025    ALBUMIN 4.5 05/20/2025    AST 18 05/20/2025    ALT 20 05/20/2025         Imaging Results (Last 7 Days)       ** No results found for the last 168 hours. **              Assessment & Plan   Diagnoses and all orders for this visit:    1. Gastroesophageal reflux disease with esophagitis without hemorrhage (Primary)  Assessment & Plan:  - Controlled.   - Continue PRN Omeprazole 40 mg daily        2. Irritable bowel syndrome with constipation  Assessment & Plan:  - Controlled through diet with increased fiber intake.       3. Dyspepsia  Assessment & Plan:  - Controlled.    - Continue PRN Omeprazole 40 mg daily          RTC in 1 year     I have discussed the above plan with the patient.  They verbalize understanding and are in agreement with the plan.  They have been advised to contact the office for any questions, concerns, or changes related to their health.

## 2025-08-19 RX ORDER — TRAZODONE HYDROCHLORIDE 50 MG/1
TABLET ORAL
Qty: 30 TABLET | Refills: 1 | Status: SHIPPED | OUTPATIENT
Start: 2025-08-19

## 2025-08-19 RX ORDER — LISINOPRIL 30 MG/1
30 TABLET ORAL DAILY
Qty: 90 TABLET | Refills: 1 | Status: SHIPPED | OUTPATIENT
Start: 2025-08-19

## (undated) DEVICE — KT ORCA ORCAPOD DISP STRL

## (undated) DEVICE — VIAL FORMLN CAP 10PCT 20ML

## (undated) DEVICE — ADAPT CLN SCPE ENDO PORPOISE BX/50 DISP

## (undated) DEVICE — FLEX ADVANTAGE 1500CC: Brand: FLEX ADVANTAGE

## (undated) DEVICE — Device

## (undated) DEVICE — SINGLE-USE BIOPSY FORCEPS: Brand: RADIAL JAW 4

## (undated) DEVICE — JACKT LAB F/R KNIT CUFF/COLR XLG BLU

## (undated) DEVICE — BITEBLOCK OMNI BLOC

## (undated) DEVICE — MSK ENDO PORT O2 POM ELITE CURAPLEX A/

## (undated) DEVICE — CANN NASL CO2 TRULINK W/O2 A/